# Patient Record
Sex: FEMALE | Race: BLACK OR AFRICAN AMERICAN | NOT HISPANIC OR LATINO | Employment: OTHER | URBAN - METROPOLITAN AREA
[De-identification: names, ages, dates, MRNs, and addresses within clinical notes are randomized per-mention and may not be internally consistent; named-entity substitution may affect disease eponyms.]

---

## 2017-01-27 ENCOUNTER — TELEPHONE (OUTPATIENT)
Dept: NEUROLOGY | Facility: CLINIC | Age: 32
End: 2017-01-27

## 2017-01-27 NOTE — TELEPHONE ENCOUNTER
----- Message from Rosey Cantrell sent at 1/27/2017  1:27 PM CST -----  Contact: self @ [phone]  Pt is returning bernadine's call.

## 2017-01-27 NOTE — TELEPHONE ENCOUNTER
----- Message from Jaylin Cochran sent at 1/27/2017 10:01 AM CST -----  Contact: self  Pt is having some problems walking.  Please call her at [phone].

## 2017-02-09 ENCOUNTER — TELEPHONE (OUTPATIENT)
Dept: NEUROLOGY | Facility: CLINIC | Age: 32
End: 2017-02-09

## 2017-02-09 NOTE — TELEPHONE ENCOUNTER
Returned call to Zita and she states someone faxed her over the notes. She did not need anything from us.

## 2017-02-09 NOTE — TELEPHONE ENCOUNTER
----- Message from Yong Kimbrough sent at 2/9/2017  1:41 PM CST -----  Contact: Zita with Dr Buchanan [phone]  Caller is calling to request the last H & P and last Neurological  Physical and Labs  fax to [phone] AttN: Rhonda

## 2017-02-09 NOTE — TELEPHONE ENCOUNTER
----- Message from Kassi Loyola sent at 2/9/2017 11:58 AM CST -----  Contact: Zita Emergency Room [facility] [phone]  Zita is calling on behalf if ER doctor get notes on patients health status, patient is currently in the ER.

## 2017-03-15 ENCOUNTER — TELEPHONE (OUTPATIENT)
Dept: NEUROLOGY | Facility: CLINIC | Age: 32
End: 2017-03-15

## 2017-03-15 NOTE — TELEPHONE ENCOUNTER
Attempted to call patient back, unable to leave message due to voicemail not being set up at this time. Will pass along info to Ms. Smith(w/ financial assisstance) to check for applicable coverage.

## 2017-03-15 NOTE — TELEPHONE ENCOUNTER
----- Message from Andree Crisostomo sent at 3/14/2017  2:29 PM CDT -----  Contact: Pt  Pt is calling stating that she was last seen without insurance and paid the $500.  Pt now has medicaid and would like to discuss if it will cover visit or will she still have to pay.    Pt can be reached at [phone].  Thank you

## 2017-03-17 ENCOUNTER — TELEPHONE (OUTPATIENT)
Dept: NEUROLOGY | Facility: CLINIC | Age: 32
End: 2017-03-17

## 2017-03-17 NOTE — TELEPHONE ENCOUNTER
Attempted to call patient back. Voicemail has still not been established; spoke with Ms. Sarah in financial assistance and pt's benefits have been verified. Pt should be permitted to schedule future appts.

## 2017-03-17 NOTE — TELEPHONE ENCOUNTER
----- Message from Jessica Tolbert sent at 3/15/2017  5:42 PM CDT -----  Contact: Self  Pt missed a phone call from you and would like a call back at your earliest convenience     Pt can be contacted at [phone]

## 2017-03-23 ENCOUNTER — OFFICE VISIT (OUTPATIENT)
Dept: NEUROLOGY | Facility: CLINIC | Age: 32
End: 2017-03-23
Payer: MEDICAID

## 2017-03-23 VITALS
DIASTOLIC BLOOD PRESSURE: 86 MMHG | SYSTOLIC BLOOD PRESSURE: 123 MMHG | WEIGHT: 208.13 LBS | HEART RATE: 95 BPM | BODY MASS INDEX: 32.67 KG/M2 | HEIGHT: 67 IN

## 2017-03-23 DIAGNOSIS — G31.80 LEUKODYSTROPHY: ICD-10-CM

## 2017-03-23 DIAGNOSIS — R23.4 PEELING SKIN: Primary | ICD-10-CM

## 2017-03-23 DIAGNOSIS — R53.1 GENERALIZED WEAKNESS: ICD-10-CM

## 2017-03-23 PROCEDURE — 99999 PR PBB SHADOW E&M-EST. PATIENT-LVL III: CPT | Mod: PBBFAC,,, | Performed by: PSYCHIATRY & NEUROLOGY

## 2017-03-23 PROCEDURE — 99213 OFFICE O/P EST LOW 20 MIN: CPT | Mod: PBBFAC | Performed by: PSYCHIATRY & NEUROLOGY

## 2017-03-23 PROCEDURE — 99215 OFFICE O/P EST HI 40 MIN: CPT | Mod: S$PBB,,, | Performed by: PSYCHIATRY & NEUROLOGY

## 2017-03-23 RX ORDER — TRAZODONE HYDROCHLORIDE 50 MG/1
50 TABLET ORAL NIGHTLY
Qty: 30 TABLET | Refills: 6 | Status: SHIPPED | OUTPATIENT
Start: 2017-03-23 | End: 2017-09-25 | Stop reason: SDUPTHER

## 2017-03-23 NOTE — MR AVS SNAPSHOT
[facility]  [address]  [facility] [address]  Phone: [phone]  Fax: [phone]                  Richard Perales   3/23/2017 1:40 PM   Office Visit    Description:  Female : 1985   Provider:  Braden Du MD   Department:  [facility] - Neurology           Diagnoses this Visit        Comments    Peeling skin    -  Primary            To Do List           Future Appointments        Provider Department Dept Phone    2017 1:00 PM Luis Mariscal MD [facility] [phone]      Goals (5 Years of Data)     None       These Medications        Disp Refills Start End    trazodone (DESYREL) 50 MG tablet 30 tablet 6 3/23/2017 3/23/2018    Take 1 tablet (50 mg total) by mouth every evening. - Oral    Pharmacy: [id number] - [place] - [address] #: [phone]         [facility] On Call     [facility] On Call Nurse Care Line -  Assistance  Registered nurses in the [facility] On Call Center provide clinical advisement, health education, appointment booking, and other advisory services.  Call for this free service at [phone].             Medications           Message regarding Medications     Verify the changes and/or additions to your medication regime listed below are the same as discussed with your clinician today.  If any of these changes or additions are incorrect, please notify your healthcare provider.        CHANGE how you are taking these medications     Start Taking Instead of    trazodone (DESYREL) 50 MG tablet trazodone (DESYREL) 50 MG tablet    Dosage:  Take 1 tablet (50 mg total) by mouth every evening. Dosage:  Take 0.5 tablets (25 mg total) by mouth every evening.    Reason for Change:  Reorder            Verify that the below list of medications is an accurate representation of the medications you are currently taking.  If none reported, the list may be blank. If incorrect, please contact your healthcare provider. Carry  "this list with you in case of emergency.           Current Medications     baclofen (LIORESAL) 10 MG tablet Take 1 tablet (10 mg total) by mouth 3 (three) times daily.    paroxetine (PAXIL) 20 MG tablet Take 1 tablet (20 mg total) by mouth once daily.    trazodone (DESYREL) 50 MG tablet Take 1 tablet (50 mg total) by mouth every evening.           Clinical Reference Information           Your Vitals Were     BP Pulse Height Weight BMI    123/86 95 5' 7" (1.702 m) 94.4 kg (208 lb 1.8 oz) 32.6 kg/m2      Blood Pressure          Most Recent Value    BP  123/86      Allergies as of 3/23/2017     No Known Allergies      Immunizations Administered on Date of Encounter - 3/23/2017     None      Orders Placed During Today's Visit      Normal Orders This Visit    Ambulatory consult to Dermatology       Language Assistance Services     ATTENTION: Language assistance services are available, free of charge. Please call [phone].      ATENCIÓN: Si habla giovanna, tiene a frias disposición servicios gratuitos de asistencia lingüística. Llame al [phone].     CHANDA Ý: N?u b?n nói Ti?ng Vi?t, có các d?ch v? h? tr? ngôn ng? mi?n phí dành cho b?n. G?i s? [phone].         [facility] complies with applicable Federal civil rights laws and does not discriminate on the basis of race, color, national origin, age, disability, or sex.        "

## 2017-03-23 NOTE — PROGRESS NOTES
"Name: Richard Perales  MRN: [id number]   CSN: [id number]      Date: 03/23/2017    Chief Complaint / Interval History:   - Steroids from last admission 3/2016 were helpful with most of her symptoms, especially her weakness  - Feels a sensation that her nerves are shaking throughout her whole body   - Not sleeping well at night   - states her vision has gotten worse, needs new glasses     History of Present Illness (HPI):  29 yo W with 6 months progressive neuro decline, starting with vision changes in October 2015 and culminating to non-verbal, paraplegic. History per outside chart and family. No prior medical issues except for shingles last year. Complained of vision changes in October and colleagues noticed slurring of speech in December. Started having problems with gait in January and taken to hospital by family when she could not get out of bed. Seen in [place] and apparently had "extensive" neurologic and rheumatologic evaluations (records sent are slim), transferred here as neurologist had nothing more to offer."    Past Medical History: The patient  has a past medical history of Leukodystrophy.    Social History: The patient   used to drink frequently, 3-4 drinks of wine coolers, no smoking or drugs.    Family History: Their family history includes Other in her other. Maternal great uncle was wheelchair bound for unknown reasons to patient. Patient is an only child. No other known neurologic disease in family.     Allergies: Review of patient's allergies indicates no known allergies.     Meds:   Current Outpatient Prescriptions on File Prior to Visit   Medication Sig Dispense Refill    baclofen (LIORESAL) 10 MG tablet Take 1 tablet (10 mg total) by mouth 3 (three) times daily. 90 tablet 11    paroxetine (PAXIL) 20 MG tablet Take 1 tablet (20 mg total) by mouth once daily. 30 tablet 11    trazodone (DESYREL) 50 MG tablet Take 0.5 tablets (25 mg total) by mouth every evening. 15 tablet 11     No current " "facility-administered medications on file prior to visit.        Exam:  /86  Pulse 95  Ht 5' 7" (1.702 m)  Wt 94.4 kg (208 lb 1.8 oz)  BMI 32.6 kg/m2 RR 16    Constitutional  Well-developed, well-nourished, appears stated age   Cardiovascular  Radial pulses 2+ and symmetric, no LE edema bilaterally   Neurological    * Mental status       - Orientation  Oriented to person, place, time, and situation     - Memory   Intact recent and remote     - Attention/concentration  Easily distractible     - Language  Naming & repetition intact, +2-step commands     - Fund of knowledge  Aware of current events     - Executive  Well-organized thoughts     - Other     * Cranial nerves       - CN II  PERRL, visual fields full to confrontation     - CN III, IV, VI  Extraocular movements full, normal pursuits and saccades, hypermetric saccades, moves head to initiate saccades      - CN V  Sensation V1 - V3 intact     - CN VII  Face strong and symmetric bilaterally     - CN VIII  Hearing intact bilaterally     - CN IX, X  Palate raises midline and symmetric     - CN XI  SCM and trapezius 5/5 bilaterally     - CN XII  Tongue midline   * Motor  Muscle bulk gen dim, strength 4/5 throughout - disuse   * Sensory   Intact to temperature and vibration throughout   * Coordination  No dysmetria with finger-to-nose or heel-to-shin   * Gait  Slow to arise, a bit spastic, wide based with truncal titubation    * Deep tendon reflexes  3+ and symmetric throughout     Laboratory/Radiological:  - Results:  No visits with results within 3 Month(s) from this visit.  Latest known visit with results is:    Admission on 03/21/2016, Discharged on 04/06/2016   No results displayed because visit has over 200 results.        Ceramide Trihex and Sulfatide, U   In this sample, the pattern of elevated excretions of   ceramide trihexoside species is suggestive of a biochemical   diagnosis of Fabry disease (OMIM 935830).    Alpha-Galactosidase, 66.9 " nmol/h/mg >=23.1  Leukocytes  In this specimen, the activity of alpha-galactosidase is   normal, indicating this patient is most likely NOT a   carrier for Fabry disease (OMIM 071139).      - Independent review of images:          Diagnoses:          Leukodystrophy.    Medical Decision Making:  Stable if not slightly improved after course of steroids last year     1) Repeat MRI to examine for disease progression  2) Increase trazadone to 50mg QHS  3) Full chart review to try and re-formulate differential    F/U in 6-8 weeks     Luis Mariscal MD  Movement Disorders Fellow  [facility]     =============  Patient seen and examined.  I agree with the history, exam, assessment and plan within the fellow's note as stated above.  Note has been edited by me to reflect my work and changes.    Braden Du MD, MPH  Division of Movement and Memory Disorders  [facility]

## 2017-03-29 ENCOUNTER — HOSPITAL ENCOUNTER (INPATIENT)
Facility: HOSPITAL | Age: 32
LOS: 7 days | Discharge: HOME | DRG: 057 | End: 2017-04-05
Attending: EMERGENCY MEDICINE | Admitting: EMERGENCY MEDICINE
Payer: MEDICAID

## 2017-03-29 DIAGNOSIS — G31.80 LEUKODYSTROPHY: Primary | ICD-10-CM

## 2017-03-29 DIAGNOSIS — R29.898 BILATERAL LEG WEAKNESS: ICD-10-CM

## 2017-03-29 DIAGNOSIS — R21 RASH AND NONSPECIFIC SKIN ERUPTION: ICD-10-CM

## 2017-03-29 LAB
ALBUMIN SERPL BCP-MCNC: 3.2 G/DL
ALP SERPL-CCNC: 78 U/L
ALT SERPL W/O P-5'-P-CCNC: 17 U/L
ANION GAP SERPL CALC-SCNC: 8 MMOL/L
AST SERPL-CCNC: 15 U/L
B-HCG UR QL: NEGATIVE
BASOPHILS # BLD AUTO: 0.03 K/UL
BASOPHILS NFR BLD: 0.4 %
BILIRUB SERPL-MCNC: 0.1 MG/DL
BUN SERPL-MCNC: 8 MG/DL
CALCIUM SERPL-MCNC: 8.4 MG/DL
CHLORIDE SERPL-SCNC: 109 MMOL/L
CO2 SERPL-SCNC: 23 MMOL/L
CREAT SERPL-MCNC: 0.7 MG/DL
CTP QC/QA: YES
DIFFERENTIAL METHOD: ABNORMAL
EOSINOPHIL # BLD AUTO: 0 K/UL
EOSINOPHIL NFR BLD: 0.4 %
ERYTHROCYTE [DISTWIDTH] IN BLOOD BY AUTOMATED COUNT: 18 %
EST. GFR  (AFRICAN AMERICAN): >60 ML/MIN/1.73 M^2
EST. GFR  (NON AFRICAN AMERICAN): >60 ML/MIN/1.73 M^2
GLUCOSE SERPL-MCNC: 96 MG/DL
HCT VFR BLD AUTO: 29 %
HGB BLD-MCNC: 8.7 G/DL
LYMPHOCYTES # BLD AUTO: 2.9 K/UL
LYMPHOCYTES NFR BLD: 35.7 %
MAGNESIUM SERPL-MCNC: 1.9 MG/DL
MCH RBC QN AUTO: 22 PG
MCHC RBC AUTO-ENTMCNC: 30 %
MCV RBC AUTO: 73 FL
MONOCYTES # BLD AUTO: 0.7 K/UL
MONOCYTES NFR BLD: 8.2 %
NEUTROPHILS # BLD AUTO: 4.4 K/UL
NEUTROPHILS NFR BLD: 55.1 %
PLATELET # BLD AUTO: 396 K/UL
PMV BLD AUTO: 8.8 FL
POTASSIUM SERPL-SCNC: 3.3 MMOL/L
PROT SERPL-MCNC: 7.1 G/DL
RBC # BLD AUTO: 3.95 M/UL
SODIUM SERPL-SCNC: 140 MMOL/L
WBC # BLD AUTO: 8.04 K/UL

## 2017-03-29 PROCEDURE — 11000001 HC ACUTE MED/SURG PRIVATE ROOM

## 2017-03-29 PROCEDURE — 99285 EMERGENCY DEPT VISIT HI MDM: CPT | Mod: ,,, | Performed by: EMERGENCY MEDICINE

## 2017-03-29 PROCEDURE — 25000003 PHARM REV CODE 250: Performed by: EMERGENCY MEDICINE

## 2017-03-29 PROCEDURE — 81025 URINE PREGNANCY TEST: CPT | Performed by: EMERGENCY MEDICINE

## 2017-03-29 PROCEDURE — 25500020 PHARM REV CODE 255: Performed by: EMERGENCY MEDICINE

## 2017-03-29 PROCEDURE — 99223 1ST HOSP IP/OBS HIGH 75: CPT | Mod: ,,, | Performed by: PHYSICIAN ASSISTANT

## 2017-03-29 PROCEDURE — 83735 ASSAY OF MAGNESIUM: CPT

## 2017-03-29 PROCEDURE — A9585 GADOBUTROL INJECTION: HCPCS | Performed by: EMERGENCY MEDICINE

## 2017-03-29 PROCEDURE — 99285 EMERGENCY DEPT VISIT HI MDM: CPT | Mod: 25

## 2017-03-29 PROCEDURE — 80053 COMPREHEN METABOLIC PANEL: CPT

## 2017-03-29 PROCEDURE — 12000002 HC ACUTE/MED SURGE SEMI-PRIVATE ROOM

## 2017-03-29 PROCEDURE — 85025 COMPLETE CBC W/AUTO DIFF WBC: CPT

## 2017-03-29 PROCEDURE — P9612 CATHETERIZE FOR URINE SPEC: HCPCS

## 2017-03-29 RX ORDER — POTASSIUM CHLORIDE 20 MEQ/1
40 TABLET, EXTENDED RELEASE ORAL
Status: COMPLETED | OUTPATIENT
Start: 2017-03-29 | End: 2017-03-29

## 2017-03-29 RX ORDER — GADOBUTROL 604.72 MG/ML
10 INJECTION INTRAVENOUS
Status: COMPLETED | OUTPATIENT
Start: 2017-03-29 | End: 2017-03-29

## 2017-03-29 RX ADMIN — POTASSIUM CHLORIDE 40 MEQ: 1500 TABLET, EXTENDED RELEASE ORAL at 09:03

## 2017-03-29 RX ADMIN — GADOBUTROL 10 ML: 604.72 INJECTION INTRAVENOUS at 07:03

## 2017-03-29 NOTE — ED NOTES
Transport here to bring pt to MRI. Pt has still not been able to provide urine specimen for UPT. Called MRI and informed MRI tech, states to call once patient is able to provide speimen.

## 2017-03-29 NOTE — ED NOTES
Pt reminded need for urine specimen - specimen cup provided and pt states she will provide as soon as she can.

## 2017-03-29 NOTE — ED PROVIDER NOTES
"Encounter Date: 3/29/2017    SCRIBE #1 NOTE: I, Margie Mccarty, am scribing for, and in the presence of,  Dr. Campa. I have scribed the following portions of the note - the Resident attestation.       History     Chief Complaint   Patient presents with    Extremity Weakness     C/o bilateral leg weakness x months that is getting worse. No dx yet. Reports "white matter in my brain is not normal."     Review of patient's allergies indicates:  No Known Allergies  HPI Comments: 31-year-old -American female presents to the emergency department due to bilateral lower extremity weakness.  She has been having this for the last 2 years.  She decided to come in today because she thinks it is getting worse and it caused her to fall.  Her last fall was days ago, she said she did hit her head but denies any LOC.  Also denies any chest pain, shortness breath, nausea, vomiting, lightheadedness or headache.  She has a past mental history significant for unknown neurological decline that started in 2015.  Initially started with slurred speech but has since then progressed to intermittent weakness.  She's had extensive workups at multiple hospitals.  She is followed by neuro at this hospital and last saw them on the 24th.  She did not tell them about her increased weakness at that time.    The history is provided by the patient.     History reviewed. No pertinent past medical history.  History reviewed. No pertinent surgical history.  History reviewed. No pertinent family history.  Social History   Substance Use Topics    Smoking status: Never Smoker    Smokeless tobacco: None    Alcohol use None     Review of Systems   Constitutional: Negative for activity change and appetite change.   HENT: Negative for congestion and ear discharge.    Eyes: Negative for pain and discharge.   Respiratory: Negative for apnea and chest tightness.    Cardiovascular: Negative for chest pain and leg swelling.   Gastrointestinal: Negative for " abdominal distention and abdominal pain.   Genitourinary: Negative for difficulty urinating and dysuria.   Musculoskeletal: Negative for arthralgias and back pain.   Skin: Negative for color change and pallor.   Neurological: Positive for weakness. Negative for headaches.   All other systems reviewed and are negative.      Physical Exam   Initial Vitals   BP Pulse Resp Temp SpO2   03/29/17 1421 03/29/17 1421 03/29/17 1421 03/29/17 1421 03/29/17 1421   130/68 101 18 99.2 °F (37.3 °C) 99 %     Physical Exam    Nursing note and vitals reviewed.  Constitutional: She appears well-developed and well-nourished. She is not diaphoretic. No distress.   HENT:   Head: Normocephalic and atraumatic.   Right Ear: External ear normal.   Left Ear: External ear normal.   Mouth/Throat: Oropharynx is clear and moist.   Eyes: EOM are normal. Pupils are equal, round, and reactive to light. No scleral icterus.   Neck: Normal range of motion. Neck supple. No JVD present.   Cardiovascular: Normal rate, regular rhythm, normal heart sounds and intact distal pulses. Exam reveals no friction rub.    No murmur heard.  Pulmonary/Chest: Breath sounds normal. No stridor. No respiratory distress. She has no wheezes.   Abdominal: Soft. Bowel sounds are normal. She exhibits no distension. There is no tenderness.   Neurological: She is alert and oriented to person, place, and time. No cranial nerve deficit.   Strength of the right lower extremity 5/5 left lower lower extremity 4/5, strength of the upper extremity 5/5 bilaterally.  Sensation intact.  Cranial nerve II through XII grossly intact.  No tremors noted.   Skin: Skin is warm and dry. No erythema. No pallor.     : no CVA ttp  Back: no midline spinal ttp.    ED Course   Procedures  Labs Reviewed   CBC W/ AUTO DIFFERENTIAL - Abnormal; Notable for the following:        Result Value    RBC 3.95 (*)     Hemoglobin 8.7 (*)     Hematocrit 29.0 (*)     MCV 73 (*)     MCH 22.0 (*)     MCHC 30.0 (*)      RDW 18.0 (*)     Platelets 396 (*)     MPV 8.8 (*)     All other components within normal limits   COMPREHENSIVE METABOLIC PANEL - Abnormal; Notable for the following:     Potassium 3.3 (*)     Calcium 8.4 (*)     Albumin 3.2 (*)     All other components within normal limits   POCT URINE PREGNANCY - Normal   MAGNESIUM         Imaging Results         MRI Brain W WO Contrast (Final result) Result time:  03/29/17 21:05:12    Final result by Beto Lion MD (03/29/17 21:05:12)    Impression:      Slight interval progression of age advanced generalized cerebral volume loss and abnormal T2/FLAIR hyperintensities throughout the supratentorial white matter with more prominent involvement of the splenium and left periatrial white matter on today's examination.  Although nonspecific, these findings would be in keeping with patient's clinical diagnosis of underlying leukodystrophy.  Short-term followup is suggested.    No evidence of abnormal enhancement.    No acute infarct or hemorrhage.  No hydrocephalus.        ______________________________________     Electronically signed by resident: BETO LION MD  Date:     03/29/17  Time:    20:23            As the supervising and teaching physician, I personally reviewed the images and resident's interpretation and I agree with the findings.          Electronically signed by: PAULA GARCIA MD  Date:     03/29/17  Time:    21:05     Narrative:    MRI brain with contrast    03/29/17 19:37:36    Accession# [id number]    CLINICAL INDICATION: 31 year old F with history of progressive lower extremities weakness     TECHNIQUE: Multiplanar multisequence MR imaging of the brain was performed before and after the administration of 10 cc of Gadavistintravenous contrast.     COMPARISON:  MRI 3/8/2016.    FINDINGS:  The ventricles are stable in size without evidence of hydrocephalus.  There is redemonstration of age advanced generalized cerebral volume loss with increase sulcation slightly increased  compared to the prior examination from 3/8/2016.  There is redemonstration of scattered abnormal T2/FLAIR hyperintensities within the supratentorial white matter bilaterally with more confluent area of involvement within the left periatrial region.  There is also involvement of the splenium of the corpus callosum with infratentorial extension to the dentate nuclei via the middle cerebellar peduncles.  These findings have overall slightly progressed.    No evidence of acute infarct or hemorrhage.  No abnormal extra-axial blood or fluid collections.  There is no abnormal parenchymal or leptomeningeal enhancement.    No midline shift or mass effect.  The major skull base T2 flow voids are present.  The visualized paranasal sinuses and mastoid air cells are clear.  Osseous marrow signal is within normal limits.  Structures within the sella and suprasellar region are within normal limits.  The basilar cisterns are patent.                   Medical Decision Making:   History:   Old Medical Records: I decided to obtain old medical records.  Clinical Tests:   Lab Tests: Ordered and Reviewed  Radiological Study: Ordered and Reviewed  Other:   I have discussed this case with another health care provider.       APC / Resident Notes:   HOII MDM  A/P: 31-year-old with weakness.  Differential includes but is not limited to worsening leukodystrophy, anemia, electrolyte abnormality.  Workup- CBC, CMP, mag - results significant for no leukocytosis, H/H8.7/29, sodium 140, potassium 3.3, chloride 109, CO2 23, BUN 8, creatinine 0.7.  MRI shows interval progression of cerebral volume loss and abnormal hyperintensities consistent with progression of leukodystrophy.  Patient will need neurology consult.  I have consulted and admitted the patient to the medicine team with every 4hr  neuro checks.  Tl Mcdowell PGY-2 [employer] EM  03/29/2017 10:26 PM         Scribe Attestation:   Scribe #1: I performed the above scribed service and the  documentation accurately describes the services I performed. I attest to the accuracy of the note.    Attending Attestation:   Physician Attestation Statement for Resident:  As the supervising MD   Physician Attestation Statement: I have personally seen and examined this patient.   I agree with the above history. -:   As the supervising MD I agree with the above PE.    As the supervising MD I agree with the above treatment, course, plan, and disposition.   -: Pt with PM Hx of leukoencephalopathy presents with BLE weakness present for months but is worsening. I considered progression of leukoencephalopathy and felt this was the most likely diagnosis. Also considered cauda equina, but felt this was less likely given physical exam and hx. I plan to check labs and get MRI brain. Anticipate admission to medicine for neurology consultation.   I have reviewed and agree with the residents interpretation of the following: lab data.  I have reviewed the following: old records at this facility.          Physician Attestation for Scribe:  Physician Attestation Statement for Scribe #1: I, Dr. Campa, reviewed documentation, as scribed by Margie Mccarty in my presence, and it is both accurate and complete.                 ED Course     Clinical Impression:   The primary encounter diagnosis was Leukodystrophy. A diagnosis of Bilateral leg weakness was also pertinent to this visit.          Abdullahi Campa MD  03/29/17 [id number]

## 2017-03-29 NOTE — IP AVS SNAPSHOT
[facility]  [address]  [facility] [address]  Phone: [phone]           Patient Discharge Instructions   Our goal is to set you up for success. This packet includes information on your condition, medications, and your home care.  It will help you care for yourself to prevent having to return to the hospital.     Please ask your nurse if you have any questions.      There are many details to remember when preparing to leave the hospital. Here is what you will need to do:    1. Take your medicine. If you are prescribed medications, review your Medication List on the following pages. You may have new medications to  at the pharmacy and others that you'll need to stop taking. Review the instructions for how and when to take your medications. Talk with your doctor or nurses if you are unsure of what to do.     2. Go to your follow-up appointments. Specific follow-up information is listed in the following pages. Your may be contacted by a nurse or clinical provider about future appointments. Be sure we have all of the phone numbers to reach you. Please contact your provider's office if you are unable to make an appointment.     3. Watch for warning signs. Your doctor or nurse will give you detailed warning signs to watch for and when to call for assistance. These instructions may also include educational information about your condition. If you experience any of warning signs to your health, call your doctor.           [facility]  Unless otherwise directed by your provider, please   contact [facility], our nurse care line   that is available for 24/7 assistance.     [phone] (toll-free)     Registered nurses in the [facility]   provide: appointment scheduling, clinical advisement, health education, and other advisory services.                  ** Verify the list of medication(s) below is accurate and up to date. Carry this with you in case of  emergency. If your medications have changed, please notify your healthcare provider.             Medication List      START taking these medications        Additional Info                      acetaminophen 325 MG tablet   Commonly known as:  TYLENOL   Refills:  0   Dose:  650 mg    Last time this was given:  650 mg on 4/5/2017  5:13 AM   Instructions:  Take 2 tablets (650 mg total) by mouth every 6 (six) hours.     Begin Date    AM    Noon    PM    Bedtime       folic acid-vit B6-vit B12 2.5-25-2 mg 2.5-25-2 mg Tab   Commonly known as:  FOLBIC or Equiv   Quantity:  30 tablet   Refills:  0   Dose:  1 tablet    Last time this was given:  1 tablet on 4/4/2017  8:56 AM   Instructions:  Take 1 tablet by mouth once daily.     Begin Date    AM    Noon    PM    Bedtime       multivitamin tablet   Commonly known as:  THERAGRAN   Refills:  0   Dose:  1 tablet    Last time this was given:  1 tablet on 4/4/2017  8:57 AM   Instructions:  Take 1 tablet by mouth once daily.     Begin Date    AM    Noon    PM    Bedtime         CHANGE how you take these medications        Additional Info                      baclofen 20 MG tablet   Commonly known as:  LIORESAL   Quantity:  90 tablet   Refills:  0   Dose:  20 mg   What changed:    - medication strength  - how much to take    Last time this was given:  20 mg on 4/5/2017  5:14 AM   Instructions:  Take 1 tablet (20 mg total) by mouth 3 (three) times daily.     Begin Date    AM    Noon    PM    Bedtime         CONTINUE taking these medications        Additional Info                      paroxetine 20 MG tablet   Commonly known as:  PAXIL   Quantity:  30 tablet   Refills:  11   Dose:  20 mg    Last time this was given:  20 mg on 4/4/2017  8:56 AM   Instructions:  Take 1 tablet (20 mg total) by mouth once daily.     Begin Date    AM    Noon    PM    Bedtime       trazodone 50 MG tablet   Commonly known as:  DESYREL   Quantity:  30 tablet   Refills:  6   Dose:  50 mg    Last time this was  given:  50 mg on 4/4/2017  8:44 PM   Instructions:  Take 1 tablet (50 mg total) by mouth every evening.     Begin Date    AM    Noon    PM    Bedtime            Where to Get Your Medications      These medications were sent to [id number] [place]  [address]  [address] [address]     Phone:  [phone]     baclofen 20 MG tablet    folic acid-vit B6-vit B12 2.5-25-2 mg 2.5-25-2 mg Tab         You can get these medications from any pharmacy     You don't need a prescription for these medications     acetaminophen 325 MG tablet    multivitamin tablet                  Please bring to all follow up appointments:    1. A copy of your discharge instructions.  2. All medicines you are currently taking in their original bottles.  3. Identification and insurance card.    Please arrive 15 minutes ahead of scheduled appointment time.    Please call 24 hours in advance if you must reschedule your appointment and/or time.        Your Scheduled Appointments     May 17, 2017  1:00 PM CDT   Neurology - Established Patient with MD Ritesh Marley german - Neurology ([facility] )    [address]  [place] [address] [phone]              Follow-up Information     Follow up with [facility] - INTERNAL MEDICINE .    Specialty:  Internal Medicine    Contact information:    [address] [facility] [place] [address] [phone]          Follow up with Kaleb Schreiber MD .    Specialty:  Genetics    Contact information:    [address]  [address]  [facility] [address]  [phone]          Follow up with [facility] .    Specialty:  Dermatology    Contact information:    [address]  [place] [address] [phone]        Referrals     Future Orders    Ambulatory Referral to Dermatology     Ambulatory Referral to Genetics     Ambulatory Referral to Internal Medicine     Ambulatory Referral to Neurology     Ambulatory Referral to  "Physical/Occupational Therapy     Questions:    Post Surgical?:  No    Eval and Treat:  Yes    Duration:      Frequency (times per week):      Precautions:      Location:      OT Location:      Restore Functional ADL Training?:      Gait Training:      Therapeutic Exercises:      Wound Care:      Traction:      Electric Stimulation:      IONTO.:      U/S:      Developmental Stimulation?:      Specialty Programs:          Discharge Instructions     Future Orders    Activity as tolerated     Call MD for:  redness, tenderness, or signs of infection (pain, swelling, redness, odor or green/yellow discharge around incision site)     Call MD for:  temperature >100.4     Call MD for:  worsening rash     Diet general     Questions:    Total calories:      Fat restriction, if any:      Protein restriction, if any:      Na restriction, if any:      Fluid restriction:      Additional restrictions:          Primary Diagnosis     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      Admission Information     Date & Time Provider Department CSN    3/29/2017  3:41 PM Benji Uriarte MD [facility] [id number]      Care Providers     Provider Role Specialty Primary office phone    Benji Uriarte MD Attending Provider Hospitalist [phone]    Mervat Betancur MD Team Attending  Hospitalist [phone]    Benji Uriarte MD Team Attending  Hospitalist [phone]      Your Vitals Were     BP Pulse Temp Resp Height Weight    131/88 (BP Location: Right arm, Patient Position: Lying, BP Method: Automatic) 74 98.4 °F (36.9 °C) (Oral) 16 5' 4" (1.626 m) 94.4 kg (208 lb 1.6 oz)    Last Period SpO2 BMI          03/09/2017 99% 35.72 kg/m2        Recent Lab Values        3/14/2016                           9:46 AM           A1C 4.6                       Allergies as of 4/5/2017     No Known Allergies      Advance Directives     An advance directive is a document which, in the event you are no " longer able to make decisions for yourself, tells your healthcare team what kind of treatment you do or do not want to receive, or who you would like to make those decisions for you.  If you do not currently have an advance directive, [facility] encourages you to create one.  For more information call:  [phone]), [phone] ([phone]),  or log on to [email or web address].        Language Assistance Services     ATTENTION: Language assistance services are available, free of charge. Please call [phone].      ATENCIÓN: Si habla español, tiene a frias disposición servicios gratuitos de asistencia lingüística. Llame al [phone].     CHÚ Ý: N?u b?n nói Ti?ng Vi?t, có các d?ch v? h? tr? ngôn ng? mi?n phí dành cho b?n. G?i s? [phone].         [facility] complies with applicable Federal civil rights laws and does not discriminate on the basis of race, color, national origin, age, disability, or sex.

## 2017-03-29 NOTE — PROVIDER PROGRESS NOTES - EMERGENCY DEPT.
Encounter Date: 3/29/2017    ED Physician Progress Notes       SCRIBE NOTE: I, Angella Powell, am scribing for, and in the presence of,  Dr. Mayen.  Physician Statement: I, Dr. Mayen, personally performed the services described in this documentation as scribed by Angella Powell in my presence, and it is both accurate and complete.     Physician Note:   Time patient was seen by the provider: 4:19 PM      The patient is a 31 y.o. female with hx of: leukodystrophy that presents to the ED with a complaint of worsening BLE weakness. Pt was admitted to [facility] 1 year ago for progressive neurologic symptoms, weakness. Pt had an extensive workup and was ultimately treated with steroids. Pt had a complicated hospital course and eventually was discharged with some good recovery of her neurologic function. Pt followed by Dr. Du in neurology and saw him 1 week ago. She reports more visual changes, balance issues, shakiness, and recurrent progressive weakness x 1 week. She reports 3 falls in the last week. No fever, chills.    Will check basic labs and refer back to the ED as pt will likely need an inpatient admission with a neurology consult.    I initially evaluated this patient and ordered workup while in intake.  The patient will receive a full evaluation in an ED pod when space is available.  All results from tests ordered in intake will not be followed by the intake team, including myself. All results will be followed by the ED Pod team.

## 2017-03-29 NOTE — ED TRIAGE NOTES
Pt states she fell 3x yesterday and states her legs are weak. Pt states her balance is off. Pt has no other complaints. Pt states her speech has been slurred for weeks.

## 2017-03-29 NOTE — ED NOTES
Pt states she is still unable to urinate. Notified Dr. Campa. Dr. Campa ordering straight catheter. If more than 500ml present at time of insertion, leave catheter in. Amount less than 500ml, take catheter out.

## 2017-03-30 LAB
ANION GAP SERPL CALC-SCNC: 7 MMOL/L
ANISOCYTOSIS BLD QL SMEAR: SLIGHT
BASOPHILS # BLD AUTO: 0.03 K/UL
BASOPHILS NFR BLD: 0.3 %
BUN SERPL-MCNC: 6 MG/DL
CALCIUM SERPL-MCNC: 8.3 MG/DL
CHLORIDE SERPL-SCNC: 108 MMOL/L
CO2 SERPL-SCNC: 24 MMOL/L
CREAT SERPL-MCNC: 0.6 MG/DL
DIFFERENTIAL METHOD: ABNORMAL
EOSINOPHIL # BLD AUTO: 0.1 K/UL
EOSINOPHIL NFR BLD: 0.6 %
ERYTHROCYTE [DISTWIDTH] IN BLOOD BY AUTOMATED COUNT: 18.2 %
EST. GFR  (AFRICAN AMERICAN): >60 ML/MIN/1.73 M^2
EST. GFR  (NON AFRICAN AMERICAN): >60 ML/MIN/1.73 M^2
GLUCOSE SERPL-MCNC: 89 MG/DL
HCT VFR BLD AUTO: 27.3 %
HGB BLD-MCNC: 8.2 G/DL
HYPOCHROMIA BLD QL SMEAR: ABNORMAL
LYMPHOCYTES # BLD AUTO: 3.8 K/UL
LYMPHOCYTES NFR BLD: 43 %
MAGNESIUM SERPL-MCNC: 1.9 MG/DL
MCH RBC QN AUTO: 21.8 PG
MCHC RBC AUTO-ENTMCNC: 30 %
MCV RBC AUTO: 73 FL
MONOCYTES # BLD AUTO: 0.7 K/UL
MONOCYTES NFR BLD: 8.3 %
NEUTROPHILS # BLD AUTO: 4.2 K/UL
NEUTROPHILS NFR BLD: 47.8 %
PHOSPHATE SERPL-MCNC: 3.1 MG/DL
PLATELET # BLD AUTO: 372 K/UL
PLATELET BLD QL SMEAR: ABNORMAL
PMV BLD AUTO: 8.8 FL
POIKILOCYTOSIS BLD QL SMEAR: SLIGHT
POTASSIUM SERPL-SCNC: 3.5 MMOL/L
RBC # BLD AUTO: 3.76 M/UL
SODIUM SERPL-SCNC: 139 MMOL/L
WBC # BLD AUTO: 8.84 K/UL

## 2017-03-30 PROCEDURE — 25000003 PHARM REV CODE 250: Performed by: HOSPITALIST

## 2017-03-30 PROCEDURE — 85025 COMPLETE CBC W/AUTO DIFF WBC: CPT

## 2017-03-30 PROCEDURE — 63600175 PHARM REV CODE 636 W HCPCS: Performed by: HOSPITALIST

## 2017-03-30 PROCEDURE — 97110 THERAPEUTIC EXERCISES: CPT

## 2017-03-30 PROCEDURE — 99233 SBSQ HOSP IP/OBS HIGH 50: CPT | Mod: ,,, | Performed by: PSYCHIATRY & NEUROLOGY

## 2017-03-30 PROCEDURE — 11000001 HC ACUTE MED/SURG PRIVATE ROOM

## 2017-03-30 PROCEDURE — 97162 PT EVAL MOD COMPLEX 30 MIN: CPT

## 2017-03-30 PROCEDURE — 36415 COLL VENOUS BLD VENIPUNCTURE: CPT

## 2017-03-30 PROCEDURE — 99406 BEHAV CHNG SMOKING 3-10 MIN: CPT

## 2017-03-30 PROCEDURE — 25000003 PHARM REV CODE 250: Performed by: PHYSICIAN ASSISTANT

## 2017-03-30 PROCEDURE — 80048 BASIC METABOLIC PNL TOTAL CA: CPT

## 2017-03-30 PROCEDURE — 83735 ASSAY OF MAGNESIUM: CPT

## 2017-03-30 PROCEDURE — 84100 ASSAY OF PHOSPHORUS: CPT

## 2017-03-30 RX ORDER — LOPERAMIDE HYDROCHLORIDE 2 MG/1
2 CAPSULE ORAL
Status: DISCONTINUED | OUTPATIENT
Start: 2017-03-30 | End: 2017-04-06 | Stop reason: HOSPADM

## 2017-03-30 RX ORDER — IBUPROFEN 200 MG
24 TABLET ORAL
Status: DISCONTINUED | OUTPATIENT
Start: 2017-03-30 | End: 2017-04-06 | Stop reason: HOSPADM

## 2017-03-30 RX ORDER — POLYETHYLENE GLYCOL 3350 17 G/17G
17 POWDER, FOR SOLUTION ORAL 3 TIMES DAILY PRN
Status: DISCONTINUED | OUTPATIENT
Start: 2017-03-30 | End: 2017-04-06 | Stop reason: HOSPADM

## 2017-03-30 RX ORDER — GLUCAGON 1 MG
1 KIT INJECTION
Status: DISCONTINUED | OUTPATIENT
Start: 2017-03-30 | End: 2017-04-06 | Stop reason: HOSPADM

## 2017-03-30 RX ORDER — ACETAMINOPHEN 325 MG/1
650 TABLET ORAL EVERY 4 HOURS PRN
Status: DISCONTINUED | OUTPATIENT
Start: 2017-03-30 | End: 2017-04-04

## 2017-03-30 RX ORDER — RAMELTEON 8 MG/1
8 TABLET ORAL NIGHTLY PRN
Status: DISCONTINUED | OUTPATIENT
Start: 2017-03-30 | End: 2017-04-06 | Stop reason: HOSPADM

## 2017-03-30 RX ORDER — IPRATROPIUM BROMIDE AND ALBUTEROL SULFATE 2.5; .5 MG/3ML; MG/3ML
3 SOLUTION RESPIRATORY (INHALATION) EVERY 4 HOURS PRN
Status: DISCONTINUED | OUTPATIENT
Start: 2017-03-30 | End: 2017-04-06 | Stop reason: HOSPADM

## 2017-03-30 RX ORDER — BACLOFEN 10 MG/1
10 TABLET ORAL 3 TIMES DAILY
Status: DISCONTINUED | OUTPATIENT
Start: 2017-03-30 | End: 2017-04-04

## 2017-03-30 RX ORDER — ONDANSETRON 4 MG/1
8 TABLET, ORALLY DISINTEGRATING ORAL EVERY 8 HOURS PRN
Status: DISCONTINUED | OUTPATIENT
Start: 2017-03-30 | End: 2017-04-06 | Stop reason: HOSPADM

## 2017-03-30 RX ORDER — IBUPROFEN 200 MG
16 TABLET ORAL
Status: DISCONTINUED | OUTPATIENT
Start: 2017-03-30 | End: 2017-04-06 | Stop reason: HOSPADM

## 2017-03-30 RX ORDER — PROMETHAZINE HYDROCHLORIDE 25 MG/1
25 TABLET ORAL EVERY 6 HOURS PRN
Status: DISCONTINUED | OUTPATIENT
Start: 2017-03-30 | End: 2017-04-06 | Stop reason: HOSPADM

## 2017-03-30 RX ORDER — PAROXETINE 10 MG/1
20 TABLET, FILM COATED ORAL DAILY
Status: DISCONTINUED | OUTPATIENT
Start: 2017-03-30 | End: 2017-04-06 | Stop reason: HOSPADM

## 2017-03-30 RX ADMIN — TRAZODONE HYDROCHLORIDE 50 MG: 50 TABLET ORAL at 09:03

## 2017-03-30 RX ADMIN — BACLOFEN 10 MG: 10 TABLET ORAL at 09:03

## 2017-03-30 RX ADMIN — BACLOFEN 10 MG: 10 TABLET ORAL at 01:03

## 2017-03-30 RX ADMIN — TRAZODONE HYDROCHLORIDE 50 MG: 50 TABLET ORAL at 01:03

## 2017-03-30 RX ADMIN — PAROXETINE HYDROCHLORIDE 20 MG: 10 TABLET, FILM COATED ORAL at 08:03

## 2017-03-30 RX ADMIN — BACLOFEN 10 MG: 10 TABLET ORAL at 06:03

## 2017-03-30 RX ADMIN — DEXTROSE: 50 INJECTION, SOLUTION INTRAVENOUS at 04:03

## 2017-03-30 NOTE — ASSESSMENT & PLAN NOTE
"Ms. Perales is a 31 year old woman with a complicated recent history, with a new diagnosis of leukodystrophy (though the cause is still undetermined). 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 Her current presentation of "weakness" is more likely due to incoordination as seen on exam, and is consistent with the new lesions seen in her cerebellum. MRA was negative for any vascular abnormalities.    Recommendations:  -- 1000mg IV solumedrol daily x3 days - will likely be stable for discharge home after final dose  -- Redraw thiamine (previous labs from 3/18/16 showed deficiency, no test since in our system) - consider starting replacement as this is a sendout test and is unlikely to result prior to discharge  -- Physical and occupational therapy    "

## 2017-03-30 NOTE — H&P
"[facility] Hospital Medicine  History & Physical    Patient Name: Richard Perales  MRN: [id number]  Admission Date: 3/29/2017  Attending Physician: PHILL Cruz MD   Primary Care Provider: Primary Doctor [facility] Medicine Team: [facility] MED S Donna Mcdonald PA-C     Patient information was obtained from patient, past medical records and ER records.     Subjective:     Principal Problem:Leukodystrophy    Chief Complaint:   Chief Complaint   Patient presents with    Extremity Weakness     C/o bilateral leg weakness x months that is getting worse. No dx yet. Reports "white matter in my brain is not normal."        HPI: Patient is a 31 year old lady with a h/o leukodystrophy.  She presents with worsening bilateral LE weakness.  She normally ambulates without assistance, but has had to use a walker the past two days.  She reports three falls during this time period.  She did hit her head once, but denies LOC.  Denies chest pain, SOB, dizziness, palpitations, fever/chills, N/V/D.  States she had a cold last week, but symptoms have resolved.  Patient was originally diagnosed with leukodystrophy in 2015 after onset of slurred speech, vision changes, and weakness.  She follows with neurology as an outpatient.    Past Medical History:   Diagnosis Date    Leukodystrophy        History reviewed. No pertinent surgical history.    Review of patient's allergies indicates:  No Known Allergies    No current facility-administered medications on file prior to encounter.      Current Outpatient Prescriptions on File Prior to Encounter   Medication Sig    baclofen (LIORESAL) 10 MG tablet Take 1 tablet (10 mg total) by mouth 3 (three) times daily.    paroxetine (PAXIL) 20 MG tablet Take 1 tablet (20 mg total) by mouth once daily.    trazodone (DESYREL) 50 MG tablet Take 1 tablet (50 mg total) by mouth every evening.     Family History     None        Social History Main Topics    Smoking status: " Never Smoker    Smokeless tobacco: Not on file    Alcohol use Not on file    Drug use: Not on file    Sexual activity: Not on file     Review of Systems   Constitutional: Positive for activity change. Negative for appetite change, chills, fatigue, fever and unexpected weight change.   HENT: Negative for congestion, rhinorrhea, sore throat, trouble swallowing and voice change.    Eyes: Negative for visual disturbance.   Respiratory: Negative for cough, choking, chest tightness, shortness of breath and wheezing.    Cardiovascular: Negative for chest pain, palpitations and leg swelling.   Gastrointestinal: Negative for abdominal distention, abdominal pain, anal bleeding, blood in stool, constipation, diarrhea, nausea and vomiting.   Endocrine: Negative for cold intolerance, heat intolerance, polydipsia and polyuria.   Genitourinary: Negative for dysuria, flank pain, frequency, hematuria and urgency.   Musculoskeletal: Negative for arthralgias, back pain, joint swelling and myalgias.   Skin: Negative for color change and rash.   Neurological: Positive for weakness. Negative for dizziness, seizures, syncope, facial asymmetry, speech difficulty, light-headedness, numbness and headaches.   Hematological: Negative for adenopathy. Does not bruise/bleed easily.   Psychiatric/Behavioral: Negative for agitation, confusion, hallucinations and suicidal ideas.     Objective:     Vital Signs (Most Recent):  Temp: 99.9 °F (37.7 °C) (03/29/17 1724)  Pulse: 90 (03/29/17 1724)  Resp: 16 (03/29/17 1724)  BP: (!) 142/81 (03/29/17 1724)  SpO2: 100 % (03/29/17 1724) Vital Signs (24h Range):  Temp:  [99.2 °F (37.3 °C)-99.9 °F (37.7 °C)] 99.9 °F (37.7 °C)  Pulse:  [] 90  Resp:  [16-18] 16  SpO2:  [99 %-100 %] 100 %  BP: (130-142)/(68-81) 142/81        There is no height or weight on file to calculate BMI.    Physical Exam   Constitutional: She is oriented to person, place, and time. She appears well-developed and well-nourished. No  distress.   HENT:   Head: Normocephalic and atraumatic.   Eyes: Pupils are equal, round, and reactive to light.   Neck: Neck supple. Carotid bruit is not present. No thyromegaly present.   Cardiovascular: Normal rate and regular rhythm.  Exam reveals no gallop.    No murmur heard.  Pulmonary/Chest: Effort normal and breath sounds normal. No respiratory distress. She has no wheezes.   Abdominal: Soft. Bowel sounds are normal. She exhibits no distension and no mass. There is no splenomegaly or hepatomegaly. There is no tenderness.   Musculoskeletal: Normal range of motion. She exhibits no edema or deformity.   Neurological: She is alert and oriented to person, place, and time. No cranial nerve deficit or sensory deficit.   5/5 RLE strength, 4/5 LLE strength, 5/5 BUE strength   Skin: Skin is warm and dry. No rash noted.   Psychiatric: She has a normal mood and affect.        Significant Labs: All pertinent labs within the past 24 hours have been reviewed.    Significant Imaging: I have reviewed all pertinent imaging results/findings within the past 24 hours.    Assessment/Plan:     * Leukodystrophy  - MRI showed slight interval progression of age advanced generalized cerebral volume loss and abnormal T2/FLAIR hyperintensities throughout the supratentorial white matter with more prominent involvement of the splenium and left periatrial white matter.  - Will consult neurology, PT/OT, SW.  - Supportive care, fall precautions, ambulate with assistance.  - Continue Baclofen 10mg TID, Paxil 20mg daily, and trazodone 50mg qHS.      VTE Risk Mitigation         Ordered     Medium Risk of VTE  Once      03/30/17 0021     Place LEXII hose  Until discontinued      03/30/17 0021     Place sequential compression device  Until discontinued      03/30/17 0021        Donna Mcdonald PA-C  Department of Hospital Medicine   [facility]

## 2017-03-30 NOTE — CONSULTS
"[facility]  Neurology  Consult Note    Patient Name: Richard Perales  MRN: [id number]  Admission Date: 3/29/2017  Hospital Length of Stay: 1 days  Code Status: Full Code   Attending Provider: Raine Benito MD   Consulting Provider: Pola Oneal MD  Primary Care Physician: Primary Doctor No  Principal Problem:Leukodystrophy    Inpatient consult to Neurology  Consult performed by: POLA ONEAL.  Consult ordered by: RAINE BENITO         Subjective:     Chief Complaint:  Lower extremity "weakness"     HPI:   Ms. Perales is a 31 year old woman with a complicated history, who is admitted for worsening weakness. She initially presented to [facility] in February 2016, complaining of visual changes (unable to describe, ?blurry) and generalized weakness since October 2015. MRI brain showed non-specific white matter changes concerning for leukodystrophy vs demyelination. She was transferred to [facility] for further workup in March 2016. Her symptoms progressed to her being nonverbal and paraplegic, and she would reportedly alternate between catatonia and severe weakness. Workup included LP which was unremarkable, including negative for MS. Autoimmune, porphyria and infectious workup was also negative (including HIV). She did have a breast abscess that was aspirated, and subsequent bacteremia (staph) while at [facility] that was treated with IV antibiotics. She also developed a scaly rash, and biopsy showed "pityriasis rubra pilaris".     She also had an EEG and EMG which were negative. Diagnosis was unclear, but the differential was a leukodystrophy. She was treated with IV steroids with a PO taper, to which she responded well. She was discharged to inpatient rehab, where she regained strength and was eventually able to walk unassisted. She has been following with Dr. Du as an outpatient, who is continuing with investigations including genetic studies. She had contradicting results for Fabry " "disease, including normal alpha-galactoside activity but increased ceramide trihexoside species.     She says that when she returned from her Neurology appointment last week, she had a fall due to lower extremity weakness. She says since then she has been ambulating with a walker and had a few falls. She says that she feels her strength is there while lying, but she feels weak when she stands up. She also describes a sensation of her nerves shaking and says her vision has worsened. She is unable to characterize her vision changes, but says "you could say it's blurry" even with her glasses.       Past Medical History:   Diagnosis Date    Leukodystrophy        History reviewed. No pertinent surgical history.    Review of patient's allergies indicates:  No Known Allergies      No current facility-administered medications on file prior to encounter.      Current Outpatient Prescriptions on File Prior to Encounter   Medication Sig    baclofen (LIORESAL) 10 MG tablet Take 1 tablet (10 mg total) by mouth 3 (three) times daily.    paroxetine (PAXIL) 20 MG tablet Take 1 tablet (20 mg total) by mouth once daily.    trazodone (DESYREL) 50 MG tablet Take 1 tablet (50 mg total) by mouth every evening.     Family History     None        Social History Main Topics    Smoking status: Never Smoker    Smokeless tobacco: Not on file    Alcohol use Not on file    Drug use: Not on file    Sexual activity: Not on file     Review of Systems   Constitutional: Negative for chills and fever.   HENT: Negative for sore throat.    Eyes: Positive for visual disturbance. Negative for photophobia.   Respiratory: Negative for cough, chest tightness and shortness of breath.    Cardiovascular: Negative for leg swelling.   Gastrointestinal: Negative for abdominal pain, diarrhea, nausea and vomiting.   Endocrine: Negative for polyuria.   Genitourinary: Negative for dysuria and hematuria.   Musculoskeletal: Negative for arthralgias and " myalgias.   Skin:        Patchy areas of dry skin   Neurological: Positive for weakness. Negative for headaches.     Objective:     Vital Signs (Most Recent):  Temp: 97.6 °F (36.4 °C) (03/30/17 0758)  Pulse: 69 (03/30/17 0758)  Resp: 12 (03/30/17 0758)  BP: 106/63 (03/30/17 0758)  SpO2: 100 % (03/30/17 0758) Vital Signs (24h Range):  Temp:  [97.6 °F (36.4 °C)-99.9 °F (37.7 °C)] 97.6 °F (36.4 °C)  Pulse:  [] 69  Resp:  [12-18] 12  SpO2:  [96 %-100 %] 100 %  BP: (106-142)/(63-86) 106/63     Weight: 88.9 kg (196 lb)  Body mass index is 33.64 kg/(m^2).    Physical Exam   Constitutional: She is oriented to person, place, and time. She appears well-developed and well-nourished.   HENT:   Head: Normocephalic and atraumatic.   Eyes: EOM are normal. Pupils are equal, round, and reactive to light.   Pulmonary/Chest: Effort normal.   Neurological: She is oriented to person, place, and time. She has an abnormal Finger-Nose-Finger Test (L>R. Also with Disdiadochokinesis (L>R) ) and an abnormal Heel to Shin Test (L>R).   Reflex Scores:       Tricep reflexes are 3+ on the right side and 3+ on the left side.       Bicep reflexes are 3+ on the right side and 3+ on the left side.       Brachioradialis reflexes are 3+ on the right side and 3+ on the left side.       Patellar reflexes are 3+ on the right side.       Achilles reflexes are 3+ on the right side.  Skin: Skin is warm.   Psychiatric: She has a normal mood and affect. Her speech is normal.   Nursing note and vitals reviewed.      NEUROLOGICAL EXAMINATION:     MENTAL STATUS   Oriented to person, place, and time.   Registration: recalls 3 of 3 objects. Recall at 5 minutes: recalls 3 of 3 objects.   Attention: normal. Concentration: normal.   Speech: speech is normal   Level of consciousness: alert    CRANIAL NERVES     CN III, IV, VI   Pupils are equal, round, and reactive to light.  Extraocular motions are normal.   Nystagmus type: Difficulty with smooth pursuit  bilaterally.    CN V   Facial sensation intact.     CN VII   Facial expression full, symmetric.     CN VIII   Hearing: intact    CN IX, X   Palate: symmetric    CN XI   CN XI normal.     CN XII   Tongue: not atrophic  Fasciculations: absent    MOTOR EXAM   Muscle bulk: normal  Overall muscle tone: normal    Strength   Right deltoid: 5/5  Left deltoid: 5/5  Right biceps: 5/5  Left biceps: 5/5  Right triceps: 5/5  Left triceps: 5/5  Right wrist extension: 5/5  Left wrist extension: 5/5  Right iliopsoas: 5/5  Left iliopsoas: 5/5  Right quadriceps: 5/5  Left quadriceps: 5/5  Right hamstrin/5  Left hamstrin/5  Right anterior tibial: 5/5  Left anterior tibial: 5/5  Right gastroc: 5/5  Left gastroc: 5/5    REFLEXES     Reflexes   Right brachioradialis: 3+  Left brachioradialis: 3+  Right biceps: 3+  Left biceps: 3+  Right triceps: 3+  Left triceps: 3+  Right patellar: 3+  Right achilles: 3+  Right plantar: upgoing  Left plantar: upgoing  Right Canela: present  Left Canela: present  Right ankle clonus: present  Left ankle clonus: present    SENSORY EXAM   Light touch normal.   Vibration normal.   Proprioception normal.        Temperature intact bilaterally     GAIT AND COORDINATION      Coordination   Finger to nose coordination: abnormal (L>R. Also with Disdiadochokinesis (L>R) )  Heel to shin coordination: abnormal (L>R)    Tremor   Resting tremor: absent  Intention tremor: absent      Significant Labs:   CBC:   Recent Labs  Lab 17  1639 17  0426   WBC 8.04 8.84   HGB 8.7* 8.2*   HCT 29.0* 27.3*   * 372*     CMP:   Recent Labs  Lab 17  1639 17  0426   GLU 96 89    139   K 3.3* 3.5    108   CO2 23 24   BUN 8 6   CREATININE 0.7 0.6   CALCIUM 8.4* 8.3*   MG 1.9 1.9   PROT 7.1  --    ALBUMIN 3.2*  --    BILITOT 0.1  --    ALKPHOS 78  --    AST 15  --    ALT 17  --    ANIONGAP 8 7*   EGFRNONAA >60.0 >60.0     Urine Studies: No results for input(s): COLORU, APPEARANCEUA,  PHUR, SPECGRAV, PROTEINUA, GLUCUA, KETONESU, BILIRUBINUA, OCCULTUA, NITRITE, UROBILINOGEN, LEUKOCYTESUR, RBCUA, WBCUA, BACTERIA, SQUAMEPITHEL, HYALINECASTS in the last 48 hours.    Invalid input(s): WRIGHTSUR     Long Chain Fatty Acids:    Ref. Range 3/3/2016 13:01 3/3/2016 13:02   C22:0 Latest Ref Range: <=96.3 nmol/mL 59.0     C24:0 Latest Ref Range: <=91.4 nmol/mL 37.9     C26:0 Latest Ref Range: <=1.30 nmol/mL 0.25     C24:0/C22:0 Latest Ref Range: <=1.39 ratio 0.64     C26:0/C22:0 Latest Ref Range: <=0.023 ratio 0.004     Pristanic Acid Latest Ref Range: <=2.98 nmol/mL 0.04     Phytanic Acid Latest Ref Range: <=9.88 nmol/mL 0.19     Pristanic/Phytanic Latest Ref Range: <=0.39 ratio 0.21     Long Chain Fatty Acids Unknown SEE BELOW     Acetylcarnitine Latest Ref Range: 5-29 umol/L   16      Arylsulfatase A,             108           nmol/h/mg  >=62   Leukocytes   Interpretation                                                 In this sample, arylsulfatase A activity is not reduced. This result indicates that this individual is not affected with either metachromatic leukodystrophy (MLD) or multiple sulfatase deficiency. If the clinical presentation is suggestive of MLD, consider urine sulfatide analysis (MML test CTSA) which is expected to be abnormal in patients with saposin B deficiency due to mutations in the prosaposin gene.     Alpha-Galactosidase, Leukocytes   Reason for Referral        Not provided   -------------------ADDITIONAL INFORMATION-------------------   Fluorometric Enzyme Assay   Alpha-Galactosidase,       66.9          nmol/h/mg  >=23.1   Leukocytes   In this specimen, the activity of alpha-galactosidase is normal, indicating this patient is most likely NOT a carrier for Fabry disease (OMIM 334032). However, please note that enzyme analysis is, in general, not sufficiently sensitive to detect all carrier females (see Rashaad REESE et al. Hum Mutat 2009; 30: 1-9; and Patricia MENDEZ et al. Mol Jaane Metab.  2008; 93: 112-128). Molecular genetic analysis of the GLA gene (MML test FABRZ/Fabry Disease, Full Gene Analysis) is the most reliable method of detecting carriers. If not already ordered, this could be done on the existing sample by calling MML within one month.     Ceramide Trihex and Sulfatide, U   In this sample, the pattern of elevated excretions of ceramide trihexoside species is suggestive of a biochemical diagnosis of Fabry disease (OMIM 119275). Please contact the Biochemical Genetics consultant or genetic counselor on call ([phone]) if you have any questions.     Other Labs: abnormal bolded  Carnitine Free 35  Carnitine Plasma 51  Pyruvic acid 0.047  HIV pcr negative  Ceruloplasmin 31  Copper, serum  Aldolase 12.8 (H)  Ammonia 34  ACE 29  Anti-thyroglobulin ab <4.0  Thiamine 28 (L)  Vitamin B6 20  Vitamin B2 3  Vitamin B3 2.31  Folate 12.8  NMO negative        OUTSIDE CSF (2/12/16):  WCB 8  CBC 32  Gluc 71  Prot 19  CMV neg  HSV1 igg 0.06  HSV2 igG 0.63  Serum hiv neg (unknown what specific test)      24hr EEG 3/2-3/3 without seizures, slow background (prelim read)      Significant Imaging: I have reviewed all pertinent imaging results/findings within the past 24 hours.   Slight interval progression of age advanced generalized cerebral volume loss and abnormal T2/FLAIR hyperintensities throughout the supratentorial white matter with more prominent involvement of the splenium and left periatrial white matter on today's examination.  Although nonspecific, these findings would be in keeping with patient's clinical diagnosis of underlying leukodystrophy.  Short-term followup is suggested.        Assessment and Plan:     * Leukodystrophy  Ms. Perales is a 31 year old woman with a complicated recent history, with a new diagnosis of leukodystrophy (though the cause is still undetermined). She may have Fabry disease, though her current tests for this have contradictory results and she has not had genetic  "testing due to complications with insurance. Her current presentation of "weakness" is more likely due to incoordination as seen on exam, and is consistent with the new lesions seen in her cerebellum. She has seen Neurology here as an outpatient, but has had issues due to insurance and will be better cared for at [facility]. However for her current symptoms, our recommendations are as below.    Recommendations:  -- MRA brain (no contrast needed) to evaluate for vascular abnormalities   -- 1000mg IV solumedrol daily x3 days  -- Physical and occupational therapy  -- Referral to Dr. Soni with [facility] upon discharge      VTE Risk Mitigation         Ordered     Medium Risk of VTE  Once      03/30/17 0021     Place sequential compression device  Until discontinued      03/30/17 0021          Thank you for your consult. I will follow-up with patient. Please contact us if you have any additional questions.       Kinjal Oneal MD  Neurology  [facility]  "

## 2017-03-30 NOTE — PROGRESS NOTES
Patient Consulted by CTTS:     The following was discussed by the Tobacco Treatment Specialist:  ? Relevance of Quitting  ? Risk to Health  ? Long Term Risk  ? Risk for Others  ? Rewards of Quitting  ? Motivation Intervention to Quit          Pt has never smoked.

## 2017-03-30 NOTE — ASSESSMENT & PLAN NOTE
- MRI showed slight interval progression of age advanced generalized cerebral volume loss and abnormal T2/FLAIR hyperintensities throughout the supratentorial white matter with more prominent involvement of the splenium and left periatrial white matter.  - Will consult neurology, PT/OT, SW.  - Supportive care, fall precautions, ambulate with assistance.  - Continue Baclofen 10mg TID, Paxil 20mg daily, and trazodone 50mg qHS.

## 2017-03-30 NOTE — PLAN OF CARE
Pt lives w her mother. Sees Dr. uD as her primary due to neuro problems.     03/30/17 0825   Discharge Assessment   Assessment Type Discharge Planning Assessment   Confirmed/corrected address and phone number on facesheet? Yes   Assessment information obtained from? Patient   Expected Length of Stay (days) 3   Communicated expected length of stay with patient/caregiver yes   Prior to hospitilization cognitive status: Alert/Oriented   Prior to hospitalization functional status: Assistive Equipment;Needs Assistance   Current cognitive status: Alert/Oriented   Current Functional Status: Assistive Equipment;Needs Assistance   Arrived From home or self-care   Lives With parent(s)   Patient's perception of discharge disposition home or selfcare   Patient currently being followed by outpatient case management? No   Patient currently receives home health services? No   Does the patient currently use HME? Yes   Equipment Currently Used at Home 3-in-1 commode;bedside commode;walker, rolling;wheelchair   Do you have any problems affording any of your prescribed medications? No   Is the patient taking medications as prescribed? yes   Do you have any financial concerns preventing you from receiving the healthcare you need? No   Does the patient have transportation to healthcare appointments? Yes   Transportation Available family or friend will provide   On Dialysis? No   Does the patient receive services at the Coumadin Clinic? No   Are there any open cases? No   Discharge Plan A Other  (has medicaid so possible outpt tx)   Discharge Plan B Other   Patient/Family In Agreement With Plan yes     Has ride home.

## 2017-03-30 NOTE — ASSESSMENT & PLAN NOTE
"Ms. Perales is a 31 year old woman with a complicated recent history, with a new diagnosis of leukodystrophy (though the cause is still undetermined). 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 Her current presentation of "weakness" is more likely due to incoordination as seen on exam, and is consistent with the new lesions seen in her cerebellum. She has seen Neurology here as an outpatient, but will not be able to follow long term due to insurance and will be better cared for at [facility]. However for her current symptoms, are recommendations are as below.    Recommendations:  -- 1000mg IV solumedrol daily x3 days  -- Physical and occupational therapy  -- Referral to Dr. Soni with [facility] upon discharge  "

## 2017-03-30 NOTE — PLAN OF CARE
Problem: Physical Therapy Goal  Goal: Physical Therapy Goal  Goals to be met by: 2017     Patient will increase functional independence with mobility by performin. Sit to stand transfer with Modified [place] using RW  2. Bed to chair transfer with Modified [place] using Rolling Walker  3. Gait x 300 feet with Modified [place] using Rolling Walker.   4. Ascend/descend 5 stair with bilateral Handrails and modified independence   5. Patient will be independent with Lower extremity exercise program per handout, to improve motor control and coordination for functional mobility.   Outcome: Ongoing (interventions implemented as appropriate)  Initial eval completed.  Discussed POC, Results and goals with patient.

## 2017-03-30 NOTE — PLAN OF CARE
Problem: Patient Care Overview  Goal: Plan of Care Review  Outcome: Ongoing (interventions implemented as appropriate)  Patient is awake, alert and oriented. Patient remained free from complaints this shift. Patient is ambulatory only with assistance she remains very shaky and unstable. Remained free from injury and falls this shift. Bed is in the low and locked position with side rail up x 2. Call light is within reach. Informed to call if need assistance. Will continue to monitor.

## 2017-03-30 NOTE — PT/OT/SLP EVAL
"Physical Therapy  Evaluation    Richard Perales   MRN: [id number]   Admitting Diagnosis: Leukodystrophy    PT Received On: 03/30/17  PT Start Time: 1100     PT Stop Time: 1139    PT Total Time (min): 39 min       Billable Minutes:  Evaluation 30 Moderate complexity  Therapeutic Exercise 9    Diagnosis: Leukodystrophy  Comorbidities that affect PT POC:  · Progressive functional decline over past few weeks  · Frequent falls at home  · Sedentary lifestyle 2* progressive weakness    Past Medical History:   Diagnosis Date    Leukodystrophy       History reviewed. No pertinent surgical history.    Referring physician: Donna Mcdonald PA-C  Date referred to PT: 3/29/2017    General Precautions: Standard, fall    Patient History:  Living Environment Comment: Patient lives with her mom in a 1 story home with 4 steps B rails at one entrance and 5 steps B rails at another entrance.  Until 1 week ago she was independent with ambulation without an assistive deivce, and dressed independently, but required assistance to get into and out of the tub.  Patient stated she usually stood to take a shower and did not need assistance for this.  Over the past couple weeks she has regressed to needing a  RW fot gait and has fallen several times at home.  Her mother is able to assist her and to provide transportation.     Equipment Currently Used at Home:  (Patient has a w/c, RW, BSC, and tub chair. )    Subjective:  Communicated with Rn prior to session.  "At home I spend most of my time lying on the bed or lying on the couch.  Is this bad?"  Chief Complaint: frequent falls and decreased balance  Patient goals: I don't wan to use a wheelchair.  I want to be able to walk.     Pain Rating:  (muscl epain reported in thighs with exercise. )    Objective:    Patient found supine in bed in NAD.  She agreed to therapy session.  Patient participated very well during session.  She did report some difficulty with memory.      Cognitive " Exam:  Oriented to: Person, Place, Time and Situation    Follows Commands/attention: Follows multistep  commands  Communication: clear/fluent  Safety awareness/insight to disability: intact    Physical Exam:  Postural examination/scapula alignment: No postural abnormalities identified    Skin integrity: Visible skin intact  Edema: None noted     Lower Extremity Range of Motion:  Right Lower Extremity: WFL  Left Lower Extremity: WFL    Lower Extremity Strength:  Right Lower Extremity: hips 4/5, otherwise 5/5  Left Lower Extremity: hips 4/5, otherwise 5/5    Neuromuscular:  1 beat clonus on L   2-3 beat clonus on R    Finger to nose:  · Dysmetria present bilaterally, but significantly impaired on L    Foot tapping: patient able to perform, but unable to maintain a steady rhythm  Heel-shin: patient only able to complete with significant reduction in speed, more impaired on L     Functional Mobility:  Bed Mobility:  Rolling/Turning to Left: Stand by assistance  Rolling/Turning Right: Stand by assistance  Scooting/Bridging: Stand by Assistance  Supine to Sit: Stand by Assistance  Sit to Supine: Stand by Assistance    Transfers:  Sit <> Stand Assistance: Supervision  Bed <> Chair Assistance:  (CGA with RW)  Chair<>Mat Assistance:  (CGA with RW)    Gait:   Gait Distance: 125 feet CGA with RW, ataxic gait pattern L>R.  Patient demonstrates good trunk control and is able to maintain appropriate  on RW, but has limb ataxia in BLEs (L>R) present during swing phase.  Patient unable to alter gait speed.     Stairs:  Patient ascended/descended 4 steps with bilateral hand rails and CGA.  Patient able to ascend with step over step gait patter, but descended with both feet on a single step.     Balance:   Static Sit: GOOD: Takes MODERATE challenges from all directions, but patient reports gradual L lean over long periods of time  Dynamic Sit: GOOD: Maintains balance through MODERATE excursions of active trunk movement  Static  Stand: FAIR: Maintains without assist but unable to take challenges  Dynamic stand: FAIR: Needs CONTACT GUARD during gait    Therapeutic Activities and Exercises:  Therapist instructed patient in role of PT and POC.      Patient performed supine LE exercises to assess movement quality, improve coordination and assess muscle fatigue after mobility activities:  · Ankle pumps 15x/ea  · Heel slides 15x/ea  · SLR 10x/ea    AM-PAC 6 CLICK MOBILITY  How much help from another person does this patient currently need?   1 = Unable, Total/Dependent Assistance  2 = A lot, Maximum/Moderate Assistance  3 = A little, Minimum/Contact Guard/Supervision  4 = None, Modified [place]/Independent    Turning over in bed (including adjusting bedclothes, sheets and blankets)?: 4  Sitting down on and standing up from a chair with arms (e.g., wheelchair, bedside commode, etc.): 4  Moving from lying on back to sitting on the side of the bed?: 3  Moving to and from a bed to a chair (including a wheelchair)?: 3  Need to walk in hospital room?: 3  Climbing 3-5 steps with a railing?: 3  Total Score: 20     AM-PAC Raw Score CMS G-Code Modifier Level of Impairment Assistance   6 % Total / Unable   7 - 9 CM 80 - 100% Maximal Assist   10 - 14 CL 60 - 80% Moderate Assist   15 - 19 CK 40 - 60% Moderate Assist   20 - 22 CJ 20 - 40% Minimal Assist   23 CI 1-20% SBA / CGA   24 CH 0% Independent/ Mod I     Patient left up in chair with all lines intact and call button in reach.    Clinical Presentation:  Evolving.  Patient with onset of worsening ataxia and frequent falls at home.     Assessment:   Richard Perales is a 31 y.o. female with a medical diagnosis of Leukodystrophy and presents with decreased functional mobility 2* ataxia and decreased balance.  Prior to recent decline, she was able to perform all mobility without an assistive device.  Now patient requires a RW for safety with all mobility 2* worsening ataxia.  Patient would benefit  from skilled PT services to improve mobility, balance, and coordination and decrease risk for falls.    Rehab identified problem list/impairments: Rehab identified problem list/impairments: gait instability, impaired balance, impaired functional mobilty, impaired coordination    Rehab potential is good.    Activity tolerance: Excellent    Discharge recommendations: Discharge Facility/Level Of Care Needs:  (oupatient PT and OT)     Barriers to discharge: Barriers to Discharge: None (Patient has 4-5 steps, but was able to safely ascned/descend with CGA and bilateral rails. )    Equipment recommendations: Equipment Needed After Discharge: none     GOALS:   Physical Therapy Goals        Problem: Physical Therapy Goal    Goal Priority Disciplines Outcome Goal Variances Interventions   Physical Therapy Goal     PT/OT, PT Ongoing (interventions implemented as appropriate)     Description:  Goals to be met by: 2017     Patient will increase functional independence with mobility by performin. Sit to stand transfer with Modified [place] using RW  2. Bed to chair transfer with Modified [place] using Rolling Walker  3. Gait  x 300 feet with Modified [place] using Rolling Walker.   4. Ascend/descend 5 stair with bilateral Handrails and modified independence   5. Patient will be independent with Lower extremity exercise program  per handout, to improve motor control and coordination for functional mobility.                   PLAN:    Patient to be seen 4 x/week to address the above listed problems via gait training, therapeutic activities, therapeutic exercises, neuromuscular re-education  Plan of Care expires: 17  Plan of Care reviewed with: patient          Amaya Pool, PT  2017

## 2017-03-30 NOTE — PLAN OF CARE
Problem: Patient Care Overview  Goal: Plan of Care Review  Outcome: Ongoing (interventions implemented as appropriate)  Pt is progressing with plan of care. Frequent rounds made to assess pain and safety. Pt skin free of breakdown, as pt turns independently. Pt ambulates with assist. Bed locked and at lowest position. Side rails up x 2. Call light within reach. Will continue to monitor.

## 2017-03-30 NOTE — SUBJECTIVE & OBJECTIVE
Past Medical History:   Diagnosis Date    Leukodystrophy        History reviewed. No pertinent surgical history.    Review of patient's allergies indicates:  No Known Allergies      No current facility-administered medications on file prior to encounter.      Current Outpatient Prescriptions on File Prior to Encounter   Medication Sig    baclofen (LIORESAL) 10 MG tablet Take 1 tablet (10 mg total) by mouth 3 (three) times daily.    paroxetine (PAXIL) 20 MG tablet Take 1 tablet (20 mg total) by mouth once daily.    trazodone (DESYREL) 50 MG tablet Take 1 tablet (50 mg total) by mouth every evening.     Family History     None        Social History Main Topics    Smoking status: Never Smoker    Smokeless tobacco: Not on file    Alcohol use Not on file    Drug use: Not on file    Sexual activity: Not on file     Review of Systems   Constitutional: Negative for chills and fever.   HENT: Negative for sore throat.    Eyes: Positive for visual disturbance. Negative for photophobia.   Respiratory: Negative for cough, chest tightness and shortness of breath.    Cardiovascular: Negative for leg swelling.   Gastrointestinal: Negative for abdominal pain, diarrhea, nausea and vomiting.   Endocrine: Negative for polyuria.   Genitourinary: Negative for dysuria and hematuria.   Musculoskeletal: Negative for arthralgias and myalgias.   Skin:        Patchy areas of dry skin   Neurological: Positive for weakness. Negative for headaches.     Objective:     Vital Signs (Most Recent):  Temp: 97.6 °F (36.4 °C) (03/30/17 0758)  Pulse: 69 (03/30/17 0758)  Resp: 12 (03/30/17 0758)  BP: 106/63 (03/30/17 0758)  SpO2: 100 % (03/30/17 0758) Vital Signs (24h Range):  Temp:  [97.6 °F (36.4 °C)-99.9 °F (37.7 °C)] 97.6 °F (36.4 °C)  Pulse:  [] 69  Resp:  [12-18] 12  SpO2:  [96 %-100 %] 100 %  BP: (106-142)/(63-86) 106/63     Weight: 88.9 kg (196 lb)  Body mass index is 33.64 kg/(m^2).    Physical Exam   Constitutional: She is oriented  to person, place, and time. She appears well-developed and well-nourished.   HENT:   Head: Normocephalic and atraumatic.   Eyes: EOM are normal. Pupils are equal, round, and reactive to light.   Pulmonary/Chest: Effort normal.   Neurological: She is oriented to person, place, and time. She has an abnormal Finger-Nose-Finger Test (L>R. Also with Disdiadochokinesis (L>R) ) and an abnormal Heel to Shin Test (L>R).   Reflex Scores:       Tricep reflexes are 3+ on the right side and 3+ on the left side.       Bicep reflexes are 3+ on the right side and 3+ on the left side.       Brachioradialis reflexes are 3+ on the right side and 3+ on the left side.       Patellar reflexes are 3+ on the right side.       Achilles reflexes are 3+ on the right side.  Skin: Skin is warm.   Psychiatric: She has a normal mood and affect. Her speech is normal.   Nursing note and vitals reviewed.      NEUROLOGICAL EXAMINATION:     MENTAL STATUS   Oriented to person, place, and time.   Registration: recalls 3 of 3 objects. Recall at 5 minutes: recalls 3 of 3 objects.   Attention: normal. Concentration: normal.   Speech: speech is normal   Level of consciousness: alert    CRANIAL NERVES     CN III, IV, VI   Pupils are equal, round, and reactive to light.  Extraocular motions are normal.   Nystagmus type: Difficulty with smooth pursuit bilaterally.    CN V   Facial sensation intact.     CN VII   Facial expression full, symmetric.     CN VIII   Hearing: intact    CN IX, X   Palate: symmetric    CN XI   CN XI normal.     CN XII   Tongue: not atrophic  Fasciculations: absent    MOTOR EXAM   Muscle bulk: normal  Overall muscle tone: normal    Strength   Right deltoid: 5/5  Left deltoid: 5/5  Right biceps: 5/5  Left biceps: 5/5  Right triceps: 5/5  Left triceps: 5/5  Right wrist extension: 5/5  Left wrist extension: 5/5  Right iliopsoas: 5/5  Left iliopsoas: 5/5  Right quadriceps: 5/5  Left quadriceps: 5/5  Right hamstrin/5  Left hamstring:  5/5  Right anterior tibial: 5/5  Left anterior tibial: 5/5  Right gastroc: 5/5  Left gastroc: 5/5    REFLEXES     Reflexes   Right brachioradialis: 3+  Left brachioradialis: 3+  Right biceps: 3+  Left biceps: 3+  Right triceps: 3+  Left triceps: 3+  Right patellar: 3+  Right achilles: 3+  Right plantar: upgoing  Left plantar: upgoing  Right Canela: present  Left Canela: present  Right ankle clonus: present  Left ankle clonus: present    SENSORY EXAM   Light touch normal.   Vibration normal.   Proprioception normal.        Temperature intact bilaterally     GAIT AND COORDINATION      Coordination   Finger to nose coordination: abnormal (L>R. Also with Disdiadochokinesis (L>R) )  Heel to shin coordination: abnormal (L>R)    Tremor   Resting tremor: absent  Intention tremor: absent      Significant Labs:   CBC:   Recent Labs  Lab 03/29/17  1639 03/30/17  0426   WBC 8.04 8.84   HGB 8.7* 8.2*   HCT 29.0* 27.3*   * 372*     CMP:   Recent Labs  Lab 03/29/17  1639 03/30/17  0426   GLU 96 89    139   K 3.3* 3.5    108   CO2 23 24   BUN 8 6   CREATININE 0.7 0.6   CALCIUM 8.4* 8.3*   MG 1.9 1.9   PROT 7.1  --    ALBUMIN 3.2*  --    BILITOT 0.1  --    ALKPHOS 78  --    AST 15  --    ALT 17  --    ANIONGAP 8 7*   EGFRNONAA >60.0 >60.0     Urine Studies: No results for input(s): COLORU, APPEARANCEUA, PHUR, SPECGRAV, PROTEINUA, GLUCUA, KETONESU, BILIRUBINUA, OCCULTUA, NITRITE, UROBILINOGEN, LEUKOCYTESUR, RBCUA, WBCUA, BACTERIA, SQUAMEPITHEL, HYALINECASTS in the last 48 hours.    Invalid input(s): WRIGHTSUR     Long Chain Fatty Acids:    Ref. Range 3/3/2016 13:01 3/3/2016 13:02   C22:0 Latest Ref Range: <=96.3 nmol/mL 59.0     C24:0 Latest Ref Range: <=91.4 nmol/mL 37.9     C26:0 Latest Ref Range: <=1.30 nmol/mL 0.25     C24:0/C22:0 Latest Ref Range: <=1.39 ratio 0.64     C26:0/C22:0 Latest Ref Range: <=0.023 ratio 0.004     Pristanic Acid Latest Ref Range: <=2.98 nmol/mL 0.04     Phytanic Acid Latest Ref  Range: <=9.88 nmol/mL 0.19     Pristanic/Phytanic Latest Ref Range: <=0.39 ratio 0.21     Long Chain Fatty Acids Unknown SEE BELOW     Acetylcarnitine Latest Ref Range: 5-29 umol/L   16      Arylsulfatase A,             108           nmol/h/mg  >=62   Leukocytes   Interpretation                                                 In this sample, arylsulfatase A activity is not reduced. This result indicates that this individual is not affected with either metachromatic leukodystrophy (MLD) or multiple sulfatase deficiency. If the clinical presentation is suggestive of MLD, consider urine sulfatide analysis (MML test CTSA) which is expected to be abnormal in patients with saposin B deficiency due to mutations in the prosaposin gene.     Alpha-Galactosidase, Leukocytes   Reason for Referral        Not provided   -------------------ADDITIONAL INFORMATION-------------------   Fluorometric Enzyme Assay   Alpha-Galactosidase,       66.9          nmol/h/mg  >=23.1   Leukocytes   In this specimen, the activity of alpha-galactosidase is normal, indicating this patient is most likely NOT a carrier for Fabry disease (OMIM 680523). However, please note that enzyme analysis is, in general, not sufficiently sensitive to detect all carrier females (see Johnnyu WL et al. Hum Mutat 2009; 30: 1-9; and Patricia WR et al. Mol Janae Metab. 2008; 93: 112-128). Molecular genetic analysis of the GLA gene (MML test FABRZ/Fabry Disease, Full Gene Analysis) is the most reliable method of detecting carriers. If not already ordered, this could be done on the existing sample by calling MML within one month.     Ceramide Trihex and Sulfatide, U   In this sample, the pattern of elevated excretions of ceramide trihexoside species is suggestive of a biochemical diagnosis of Fabry disease (OMIM 620502). Please contact the Biochemical Genetics consultant or genetic counselor on call ([phone]) if you have any questions.     Other Labs: abnormal  bolded  Carnitine Free 35  Carnitine Plasma 51  Pyruvic acid 0.047  HIV pcr negative  Ceruloplasmin 31  Copper, serum  Aldolase 12.8 (H)  Ammonia 34  ACE 29  Anti-thyroglobulin ab <4.0  Thiamine 28 (L)  Vitamin B6 20  Vitamin B2 3  Vitamin B3 2.31  Folate 12.8  NMO negative        OUTSIDE CSF (2/12/16):  WCB 8  CBC 32  Gluc 71  Prot 19  CMV neg  HSV1 igg 0.06  HSV2 igG 0.63  Serum hiv neg (unknown what specific test)      24hr EEG 3/2-3/3 without seizures, slow background (prelim read)      Significant Imaging: I have reviewed all pertinent imaging results/findings within the past 24 hours.   Slight interval progression of age advanced generalized cerebral volume loss and abnormal T2/FLAIR hyperintensities throughout the supratentorial white matter with more prominent involvement of the splenium and left periatrial white matter on today's examination.  Although nonspecific, these findings would be in keeping with patient's clinical diagnosis of underlying leukodystrophy.  Short-term followup is suggested.

## 2017-03-30 NOTE — SUBJECTIVE & OBJECTIVE
Past Medical History:   Diagnosis Date    Leukodystrophy        History reviewed. No pertinent surgical history.    Review of patient's allergies indicates:  No Known Allergies    No current facility-administered medications on file prior to encounter.      Current Outpatient Prescriptions on File Prior to Encounter   Medication Sig    baclofen (LIORESAL) 10 MG tablet Take 1 tablet (10 mg total) by mouth 3 (three) times daily.    paroxetine (PAXIL) 20 MG tablet Take 1 tablet (20 mg total) by mouth once daily.    trazodone (DESYREL) 50 MG tablet Take 1 tablet (50 mg total) by mouth every evening.     Family History     None        Social History Main Topics    Smoking status: Never Smoker    Smokeless tobacco: Not on file    Alcohol use Not on file    Drug use: Not on file    Sexual activity: Not on file     Review of Systems   Constitutional: Positive for activity change. Negative for appetite change, chills, fatigue, fever and unexpected weight change.   HENT: Negative for congestion, rhinorrhea, sore throat, trouble swallowing and voice change.    Eyes: Negative for visual disturbance.   Respiratory: Negative for cough, choking, chest tightness, shortness of breath and wheezing.    Cardiovascular: Negative for chest pain, palpitations and leg swelling.   Gastrointestinal: Negative for abdominal distention, abdominal pain, anal bleeding, blood in stool, constipation, diarrhea, nausea and vomiting.   Endocrine: Negative for cold intolerance, heat intolerance, polydipsia and polyuria.   Genitourinary: Negative for dysuria, flank pain, frequency, hematuria and urgency.   Musculoskeletal: Negative for arthralgias, back pain, joint swelling and myalgias.   Skin: Negative for color change and rash.   Neurological: Positive for weakness. Negative for dizziness, seizures, syncope, facial asymmetry, speech difficulty, light-headedness, numbness and headaches.   Hematological: Negative for adenopathy. Does not  bruise/bleed easily.   Psychiatric/Behavioral: Negative for agitation, confusion, hallucinations and suicidal ideas.     Objective:     Vital Signs (Most Recent):  Temp: 99.9 °F (37.7 °C) (03/29/17 1724)  Pulse: 90 (03/29/17 1724)  Resp: 16 (03/29/17 1724)  BP: (!) 142/81 (03/29/17 1724)  SpO2: 100 % (03/29/17 1724) Vital Signs (24h Range):  Temp:  [99.2 °F (37.3 °C)-99.9 °F (37.7 °C)] 99.9 °F (37.7 °C)  Pulse:  [] 90  Resp:  [16-18] 16  SpO2:  [99 %-100 %] 100 %  BP: (130-142)/(68-81) 142/81        There is no height or weight on file to calculate BMI.    Physical Exam   Constitutional: She is oriented to person, place, and time. She appears well-developed and well-nourished. No distress.   HENT:   Head: Normocephalic and atraumatic.   Eyes: Pupils are equal, round, and reactive to light.   Neck: Neck supple. Carotid bruit is not present. No thyromegaly present.   Cardiovascular: Normal rate and regular rhythm.  Exam reveals no gallop.    No murmur heard.  Pulmonary/Chest: Effort normal and breath sounds normal. No respiratory distress. She has no wheezes.   Abdominal: Soft. Bowel sounds are normal. She exhibits no distension and no mass. There is no splenomegaly or hepatomegaly. There is no tenderness.   Musculoskeletal: Normal range of motion. She exhibits no edema or deformity.   Neurological: She is alert and oriented to person, place, and time. No cranial nerve deficit or sensory deficit.   5/5 RLE strength, 4/5 LLE strength, 5/5 BUE strength   Skin: Skin is warm and dry. No rash noted.   Psychiatric: She has a normal mood and affect.        Significant Labs: All pertinent labs within the past 24 hours have been reviewed.    Significant Imaging: I have reviewed all pertinent imaging results/findings within the past 24 hours.

## 2017-03-31 LAB
BASOPHILS # BLD AUTO: 0 K/UL
BASOPHILS NFR BLD: 0 %
DIFFERENTIAL METHOD: ABNORMAL
EOSINOPHIL # BLD AUTO: 0 K/UL
EOSINOPHIL NFR BLD: 0 %
ERYTHROCYTE [DISTWIDTH] IN BLOOD BY AUTOMATED COUNT: 18 %
FERRITIN SERPL-MCNC: 15 NG/ML
FOLATE SERPL-MCNC: 20.2 NG/ML
HCT VFR BLD AUTO: 27.6 %
HGB BLD-MCNC: 8.7 G/DL
IRON SERPL-MCNC: 17 UG/DL
LYMPHOCYTES # BLD AUTO: 1.3 K/UL
LYMPHOCYTES NFR BLD: 15.7 %
MCH RBC QN AUTO: 22.2 PG
MCHC RBC AUTO-ENTMCNC: 31.5 %
MCV RBC AUTO: 70 FL
MONOCYTES # BLD AUTO: 0.4 K/UL
MONOCYTES NFR BLD: 4.2 %
NEUTROPHILS # BLD AUTO: 6.7 K/UL
NEUTROPHILS NFR BLD: 79.9 %
PLATELET # BLD AUTO: 409 K/UL
PMV BLD AUTO: 8.7 FL
RBC # BLD AUTO: 3.92 M/UL
RETICS/RBC NFR AUTO: 1.9 %
SATURATED IRON: 4 %
TOTAL IRON BINDING CAPACITY: 404 UG/DL
TRANSFERRIN SERPL-MCNC: 273 MG/DL
VIT B12 SERPL-MCNC: 888 PG/ML
WBC # BLD AUTO: 8.41 K/UL

## 2017-03-31 PROCEDURE — 82607 VITAMIN B-12: CPT

## 2017-03-31 PROCEDURE — 85045 AUTOMATED RETICULOCYTE COUNT: CPT

## 2017-03-31 PROCEDURE — 36415 COLL VENOUS BLD VENIPUNCTURE: CPT

## 2017-03-31 PROCEDURE — 85025 COMPLETE CBC W/AUTO DIFF WBC: CPT

## 2017-03-31 PROCEDURE — 99233 SBSQ HOSP IP/OBS HIGH 50: CPT | Mod: ,,, | Performed by: HOSPITALIST

## 2017-03-31 PROCEDURE — 25000003 PHARM REV CODE 250: Performed by: PHYSICIAN ASSISTANT

## 2017-03-31 PROCEDURE — 82746 ASSAY OF FOLIC ACID SERUM: CPT

## 2017-03-31 PROCEDURE — 63600175 PHARM REV CODE 636 W HCPCS: Performed by: HOSPITALIST

## 2017-03-31 PROCEDURE — 25000003 PHARM REV CODE 250: Performed by: HOSPITALIST

## 2017-03-31 PROCEDURE — 11000001 HC ACUTE MED/SURG PRIVATE ROOM

## 2017-03-31 PROCEDURE — 82728 ASSAY OF FERRITIN: CPT

## 2017-03-31 PROCEDURE — 99233 SBSQ HOSP IP/OBS HIGH 50: CPT | Mod: ,,, | Performed by: PSYCHIATRY & NEUROLOGY

## 2017-03-31 PROCEDURE — 94760 N-INVAS EAR/PLS OXIMETRY 1: CPT

## 2017-03-31 PROCEDURE — 83540 ASSAY OF IRON: CPT

## 2017-03-31 PROCEDURE — 84425 ASSAY OF VITAMIN B-1: CPT

## 2017-03-31 RX ADMIN — PAROXETINE HYDROCHLORIDE 20 MG: 10 TABLET, FILM COATED ORAL at 08:03

## 2017-03-31 RX ADMIN — BACLOFEN 10 MG: 10 TABLET ORAL at 05:03

## 2017-03-31 RX ADMIN — THERA TABS 1 TABLET: TAB at 01:03

## 2017-03-31 RX ADMIN — BACLOFEN 10 MG: 10 TABLET ORAL at 01:03

## 2017-03-31 RX ADMIN — THIAMINE HYDROCHLORIDE 100 MG: 100 INJECTION, SOLUTION INTRAMUSCULAR; INTRAVENOUS at 03:03

## 2017-03-31 RX ADMIN — BACLOFEN 10 MG: 10 TABLET ORAL at 09:03

## 2017-03-31 RX ADMIN — Medication 1 TABLET: at 01:03

## 2017-03-31 RX ADMIN — TRAZODONE HYDROCHLORIDE 50 MG: 50 TABLET ORAL at 09:03

## 2017-03-31 RX ADMIN — DEXTROSE: 50 INJECTION, SOLUTION INTRAVENOUS at 03:03

## 2017-03-31 NOTE — PROGRESS NOTES
"Progress Note  [facility] Medicine      Admit Date: 3/29/2017    SUBJECTIVE:     Follow-up For:  Leukodystrophy    HPI and Hospital Course: Per neuro consult, "Ms. Perales is a 31 year old woman with a complicated history, who is admitted for worsening weakness. She initially presented to [facility] in February 2016, complaining of visual changes (unable to describe, ?blurry) and generalized weakness since October 2015. MRI brain showed non-specific white matter changes concerning for leukodystrophy vs demyelination. She was transferred to [facility] for further workup in March 2016. Her symptoms progressed to her being nonverbal and paraplegic, and she would reportedly alternate between catatonia and severe weakness. Workup included LP which was unremarkable, including negative for MS. Autoimmune, porphyria and infectious workup was also negative (including HIV). She did have a breast abscess that was aspirated, and subsequent bacteremia (staph) while at [facility] that was treated with IV antibiotics. She also developed a scaly rash, and biopsy showed "pityriasis rubra pilaris".      She also had an EEG and EMG which were negative. Diagnosis was unclear, but the differential was a leukodystrophy. She was treated with IV steroids with a PO taper, to which she responded well. She was discharged to inpatient rehab, where she regained strength and was eventually able to walk unassisted. She has been following with Dr. Du as an outpatient, who is continuing with investigations including genetic studies. She had contradicting results for Fabry disease, including normal alpha-galactoside activity but increased ceramide trihexoside species.      She says that when she returned from her Neurology appointment last week, she had a fall due to lower extremity weakness. She says since then she has been ambulating with a walker and had a few falls. She says that she feels her strength is there while lying, but she feels weak when " "she stands up. She also describes a sensation of her nerves shaking and says her vision has worsened. She is unable to characterize her vision changes, but says "you could say it's blurry" even with her glasses."    Interval History: Started solumedrol last night, thus today is day 2 of 3-5 days, states that her speech is improved but otherwise similar to admit.  Starting empiric IV thiamine. Has anemia - denies BPR - states she has never had this diagnosis before, LMP beginning of March, uses about 5 pads daily for 5-7 days.     Review of Systems:  Pain Scale: 0 /10   Constitutional: no fever or chills  Respiratory: no cough or shortness of breath  Cardiovascular: no chest pain or palpitations  Gastrointestinal: no nausea or vomiting, no abdominal pain or change in bowel habits  Genitourinary: no hematuria or dysuria  Integument/Breast: no rash or pruritis  Hematologic/Lymphatic: no easy bruising or lymphadenopathy  Musculoskeletal: no arthralgias or myalgias  Neurological: no seizures or tremors  Behavioral/Psych: no depression or anxiety    OBJECTIVE:     Vital Signs Range (Last 24H):  Temp:  [97.6 °F (36.4 °C)-99 °F (37.2 °C)]   Pulse:  [70-89]   Resp:  [15-16]   BP: (111-139)/(70-81)   SpO2:  [95 %-100 %]     I & O (Last 24H):  Intake/Output Summary (Last 24 hours) at 03/31/17 1156  Last data filed at 03/31/17 0445   Gross per 24 hour   Intake             2010 ml   Output                0 ml   Net             2010 ml       Physical Exam:  General appearance: no distress  Mental status: Alert and oriented x 3  HEENT:  conjunctivae/corneas clear, PERRL  Neck: supple, thyroid not enlarged  Pulm:   normal respiratory effort, CTA B, no c/w/r  Card: RRR, S1, S2 normal, no murmur, click, rub or gallop  Abd: soft, NT, ND, BS present; no masses, no organomegaly  Ext: no c/c/e  Pulses: 2+, symmetric  Skin: color, texture, turgor normal. No rashes or lesions  Neuro: CN II-XII grossly intact, with nystagmus and " "dysdiadochokinesis (L > R) and abnormal heel to shin (L>R)    Diagnostic Results:  Labs reviewed    Recent Results (from the past 24 hour(s))   CBC with Automated Differential    Collection Time: 03/31/17  5:18 AM   Result Value Ref Range    WBC 8.41 3.90 - 12.70 K/uL    RBC 3.92 (L) 4.00 - 5.40 M/uL    Hemoglobin 8.7 (L) 12.0 - 16.0 g/dL    Hematocrit 27.6 (L) 37.0 - 48.5 %    MCV 70 (L) 82 - 98 fL    MCH 22.2 (L) 27.0 - 31.0 pg    MCHC 31.5 (L) 32.0 - 36.0 %    RDW 18.0 (H) 11.5 - 14.5 %    Platelets 409 (H) 150 - 350 K/uL    MPV 8.7 (L) 9.2 - 12.9 fL    Gran # 6.7 1.8 - 7.7 K/uL    Lymph # 1.3 1.0 - 4.8 K/uL    Mono # 0.4 0.3 - 1.0 K/uL    Eos # 0.0 0.0 - 0.5 K/uL    Baso # 0.00 0.00 - 0.20 K/uL    Gran% 79.9 (H) 38.0 - 73.0 %    Lymph% 15.7 (L) 18.0 - 48.0 %    Mono% 4.2 4.0 - 15.0 %    Eosinophil% 0.0 0.0 - 8.0 %    Basophil% 0.0 0.0 - 1.9 %    Differential Method Automated            ASSESSMENT/PLAN:     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 Per Dr Mcgregor (neuro staff), "Pt with unknown glycogen/lysosomal storage deficiency, and known to Movement disorder division of neurology. Pt with difficulty ambulating 2 days ago. Likely more related to cerebellar changes seen on MRI than actual weakness. Cerebellar signs noted in extraocular eye movement, speech and left > right UE and LE movement. At this time, I have spoken with the movement disorders division doctors who previously saw her and we have agreed that as she has had trouble with her insurance at our facility, she should follow up with Dr. Soni at [facility], and she would likely benefit from 3-5 days of IV steroids. Neurology to follow and determine if she needs 3 days or 5 days of treatment. She will also likely need genetics consult as an outpatient."  - solumedrol 1g IV x 3-5 days, f/u neuro recs  - home baclofen 10 TID  - MRI brain with and without contrast "Slight interval progression of age advanced " "generalized cerebral volume loss and abnormal T2/FLAIR hyperintensities throughout the supratentorial white matter with more prominent involvement of the splenium and left periatrial white matter on today's examination.  Although nonspecific, these findings would be in keeping with patient's clinical diagnosis of underlying leukodystrophy." MRA without contrast unremarkable  - start empiric thiamine 100mg IV daily and transition to PO upon d/c. Thiamine level added on to morning labs - will likely  not be resulted at time of d/c. Start B12, folate, MVI as well  - PT/OT  - [facility] and      Anemia, microcytic.  No overt bleeding.  Likely anemia of acute on chronic illness. She is weak (due to neuro disorder as above) so will monitor closely for need for transfusion.   - check Fe studies,   - defer to outpatient setting for further testing such as Hb electrophoresis and BMBx    Mood disorder  - home paroxetine 20  - home trazodone 50 qhs    albuterol-ipratropium 2.5mg-0.5mg/3mL nebulizer solution 3 mL, 3 mL, Nebulization, Q4H PRN    Prophylaxis-  enoxaparin    Advance directives - full code    Post-acute care- pending clinical condition, home with outpt rehab Rx and close f/u at [facility] (Dr Soni)    Time spent in care of patient: 45 mins    Raine Benito MD  Hospital Medicine Staff          "

## 2017-03-31 NOTE — SUBJECTIVE & OBJECTIVE
Subjective:     Interval History: No events overnight. She reports that her symptoms are stable, though feels her speech is a little better. She ambulated this morning with similar difficulty.      Current Facility-Administered Medications   Medication Dose Route Frequency Provider Last Rate Last Dose    acetaminophen tablet 650 mg  650 mg Oral Q4H PRN Donna Mcdonald PA-C        albuterol-ipratropium 2.5mg-0.5mg/3mL nebulizer solution 3 mL  3 mL Nebulization Q4H PRN Donna Mcdonald PA-C        baclofen tablet 10 mg  10 mg Oral TID Donna Mcdonald PA-C   10 mg at 03/31/17 0528    dextrose 50% injection 12.5 g  12.5 g Intravenous PRN oDnna Mcdonadl PA-C        dextrose 50% injection 25 g  25 g Intravenous PRN Donna Mcdonald PA-C        glucagon (human recombinant) injection 1 mg  1 mg Intramuscular PRN Donna Mcdonald PA-C        glucose chewable tablet 16 g  16 g Oral PRN Donna Mcdonald PA-C        glucose chewable tablet 24 g  24 g Oral PRN Donna Mcdonald PA-C        loperamide capsule 2 mg  2 mg Oral PRN Donna Mcdonald PA-C        methylPREDNISolone sodium succinate (SOLU-MEDROL) 1,000 mg in dextrose 5 % 100 mL IVPB   Intravenous Daily Raine Benito MD        ondansetron disintegrating tablet 8 mg  8 mg Oral Q8H PRN Donna Mcdonald PA-C        paroxetine tablet 20 mg  20 mg Oral Daily Donna Mcdonald PA-C   20 mg at 03/31/17 0843    polyethylene glycol packet 17 g  17 g Oral TID PRN Donna Mcdonald PA-C        promethazine tablet 25 mg  25 mg Oral Q6H PRN Donna Mcdonald PA-C        ramelteon tablet 8 mg  8 mg Oral Nightly PRN Donna Mcdonald PA-C        trazodone tablet 50 mg  50 mg Oral QHS MICHELLE Del ValleC   50 mg at 03/30/17 2151       Review of Systems   Constitutional: Negative for fever.   Gastrointestinal: Negative for abdominal pain.   Musculoskeletal: Negative for arthralgias and myalgias.    Psychiatric/Behavioral: Negative for agitation.     Objective:     Vital Signs (Most Recent):  Temp: 98.1 °F (36.7 °C) (03/31/17 0755)  Pulse: 75 (03/31/17 0755)  Resp: 16 (03/31/17 0755)  BP: 112/70 (03/31/17 0755)  SpO2: 95 % (03/31/17 0755) Vital Signs (24h Range):  Temp:  [97.6 °F (36.4 °C)-99 °F (37.2 °C)] 98.1 °F (36.7 °C)  Pulse:  [70-89] 75  Resp:  [15-16] 16  SpO2:  [95 %-100 %] 95 %  BP: (111-139)/(70-81) 112/70     Weight: 88.9 kg (196 lb)  Body mass index is 33.64 kg/(m^2).    Physical Exam   Constitutional: She is oriented to person, place, and time. She appears well-developed and well-nourished.   Eyes: EOM are normal. Pupils are equal, round, and reactive to light.   Pulmonary/Chest: Effort normal.   Neurological: She is oriented to person, place, and time. She has an abnormal Finger-Nose-Finger Test (L>R. Also with Disdiadochokinesis (L>R) ) and an abnormal Heel to Gomez Test (L>R).   Psychiatric: Her speech is normal.       NEUROLOGICAL EXAMINATION:     MENTAL STATUS   Oriented to person, place, and time.   Speech: speech is normal   Level of consciousness: alert    CRANIAL NERVES     CN III, IV, VI   Pupils are equal, round, and reactive to light.  Extraocular motions are normal.   Nystagmus type: Difficulty with smooth pursuit bilaterally.    CN VII   Facial expression full, symmetric.     CN XI   CN XI normal.     MOTOR EXAM   Muscle bulk: normal  Overall muscle tone: normal    SENSORY EXAM   Light touch normal.        Temperature intact bilaterally     GAIT AND COORDINATION      Coordination   Finger to nose coordination: abnormal (L>R. Also with Disdiadochokinesis (L>R) )  Heel to shin coordination: abnormal (L>R)    Tremor   Resting tremor: absent  Intention tremor: absent      Significant Labs:   CBC:   Recent Labs  Lab 03/29/17  1639 03/30/17  0426 03/31/17  0518   WBC 8.04 8.84 8.41   HGB 8.7* 8.2* 8.7*   HCT 29.0* 27.3* 27.6*   * 372* 409*     CMP:   Recent Labs  Lab 03/29/17  6289  03/30/17  0426   GLU 96 89    139   K 3.3* 3.5    108   CO2 23 24   BUN 8 6   CREATININE 0.7 0.6   CALCIUM 8.4* 8.3*   MG 1.9 1.9   PROT 7.1  --    ALBUMIN 3.2*  --    BILITOT 0.1  --    ALKPHOS 78  --    AST 15  --    ALT 17  --    ANIONGAP 8 7*   EGFRNONAA >60.0 >60.0       Long Chain Fatty Acids:     Ref. Range 3/3/2016 13:01 3/3/2016 13:02   C22:0 Latest Ref Range: <=96.3 nmol/mL 59.0      C24:0 Latest Ref Range: <=91.4 nmol/mL 37.9      C26:0 Latest Ref Range: <=1.30 nmol/mL 0.25      C24:0/C22:0 Latest Ref Range: <=1.39 ratio 0.64      C26:0/C22:0 Latest Ref Range: <=0.023 ratio 0.004      Pristanic Acid Latest Ref Range: <=2.98 nmol/mL 0.04      Phytanic Acid Latest Ref Range: <=9.88 nmol/mL 0.19      Pristanic/Phytanic Latest Ref Range: <=0.39 ratio 0.21      Long Chain Fatty Acids Unknown SEE BELOW      Acetylcarnitine Latest Ref Range: 5-29 umol/L    16       Arylsulfatase A,             108           nmol/h/mg  >=62   Leukocytes   Interpretation                                                 In this sample, arylsulfatase A activity is not reduced. This result indicates that this individual is not affected with either metachromatic leukodystrophy (MLD) or multiple sulfatase deficiency. If the clinical presentation is suggestive of MLD, consider urine sulfatide analysis (MML test CTSA) which is expected to be abnormal in patients with saposin B deficiency due to mutations in the prosaposin gene.      Alpha-Galactosidase, Leukocytes   Reason for Referral        Not provided   -------------------ADDITIONAL INFORMATION-------------------   Fluorometric Enzyme Assay   Alpha-Galactosidase,       66.9          nmol/h/mg  >=23.1   Leukocytes   In this specimen, the activity of alpha-galactosidase is normal, indicating this patient is most likely NOT a carrier for Fabry disease (OMIM [id number]). However, please note that enzyme analysis is, in general, not sufficiently sensitive to detect all carrier  females (see Rashaad WL et al. Hum Mutat 2009; 30: 1-9; and Patricia WR et al. Mol Janae Metab. 2008; 93: 112-128). Molecular genetic analysis of the GLA gene (MML test FABRZ/Fabry Disease, Full Gene Analysis) is the most reliable method of detecting carriers. If not already ordered, this could be done on the existing sample by calling MML within one month.      Ceramide Trihex and Sulfatide, U   In this sample, the pattern of elevated excretions of ceramide trihexoside species is suggestive of a biochemical diagnosis of Fabry disease (OMIM [id number]). Please contact the Biochemical Genetics consultant or genetic counselor on call ([phone]) if you have any questions.      Other Labs: abnormal bolded  Carnitine Free 35  Carnitine Plasma 51  Pyruvic acid 0.047  HIV pcr negative  Ceruloplasmin 31  Copper, serum  Aldolase 12.8 (H)  Ammonia 34  ACE 29  Anti-thyroglobulin ab <4.0  Thiamine 28 (L)  Vitamin B6 20  Vitamin B2 3  Vitamin B3 2.31  Folate 12.8  NMO negative         OUTSIDE CSF (2/12/16):  WCB 8  CBC 32  Gluc 71  Prot 19  CMV neg  HSV1 igg 0.06  HSV2 igG 0.63  Serum hiv neg (unknown what specific test)        24hr EEG 3/2-3/3 without seizures, slow background (prelim read)        Significant Imaging: I have reviewed all pertinent imaging results/findings within the past 24 hours.   Slight interval progression of age advanced generalized cerebral volume loss and abnormal T2/FLAIR hyperintensities throughout the supratentorial white matter with more prominent involvement of the splenium and left periatrial white matter on today's examination.  Although nonspecific, these findings would be in keeping with patient's clinical diagnosis of underlying leukodystrophy.  Short-term followup is suggested.

## 2017-03-31 NOTE — PROGRESS NOTES
[facility]  Neurology  Progress Note    Patient Name: Richard Perales  MRN: [id number]  Admission Date: 3/29/2017  Hospital Length of Stay: 2 days  Code Status: Full Code   Attending Provider: Raine Benito MD  Primary Care Physician: Primary Doctor No   Principal Problem:Leukodystrophy      Subjective:     Interval History: No events overnight. She reports that her symptoms are stable, though feels her speech is a little better. She ambulated this morning with similar difficulty.      Current Facility-Administered Medications   Medication Dose Route Frequency Provider Last Rate Last Dose    acetaminophen tablet 650 mg  650 mg Oral Q4H PRN Donna Mcdonald PA-C        albuterol-ipratropium 2.5mg-0.5mg/3mL nebulizer solution 3 mL  3 mL Nebulization Q4H PRN Donna Mcdonald PA-C        baclofen tablet 10 mg  10 mg Oral TID Donna Mcdonald PA-C   10 mg at 03/31/17 0528    dextrose 50% injection 12.5 g  12.5 g Intravenous PRN Donna Mcdonald PA-C        dextrose 50% injection 25 g  25 g Intravenous PRN Donna Mcdonald PA-C        glucagon (human recombinant) injection 1 mg  1 mg Intramuscular PRN Donna Mcdonald PA-C        glucose chewable tablet 16 g  16 g Oral PRN Donna Mcdonald PA-C        glucose chewable tablet 24 g  24 g Oral PRN Donna Mcdonald PA-C        loperamide capsule 2 mg  2 mg Oral PRN Donna Mcdonald PA-C        methylPREDNISolone sodium succinate (SOLU-MEDROL) 1,000 mg in dextrose 5 % 100 mL IVPB   Intravenous Daily Raine Benito MD        ondansetron disintegrating tablet 8 mg  8 mg Oral Q8H PRN Donna Mcdonald PA-C        paroxetine tablet 20 mg  20 mg Oral Daily Donna Mcdonald PA-C   20 mg at 03/31/17 0843    polyethylene glycol packet 17 g  17 g Oral TID PRN Donna Mcdonald PA-C        promethazine tablet 25 mg  25 mg Oral Q6H PRN Donna Mcdonald PA-C        ramelteon tablet 8 mg  8 mg Oral  Nightly PRN Donna Mcdonald PA-C        trazodone tablet 50 mg  50 mg Oral QHS Donna Mcdonald PA-C   50 mg at 03/30/17 2151       Review of Systems   Constitutional: Negative for fever.   Gastrointestinal: Negative for abdominal pain.   Musculoskeletal: Negative for arthralgias and myalgias.   Psychiatric/Behavioral: Negative for agitation.     Objective:     Vital Signs (Most Recent):  Temp: 98.1 °F (36.7 °C) (03/31/17 0755)  Pulse: 75 (03/31/17 0755)  Resp: 16 (03/31/17 0755)  BP: 112/70 (03/31/17 0755)  SpO2: 95 % (03/31/17 0755) Vital Signs (24h Range):  Temp:  [97.6 °F (36.4 °C)-99 °F (37.2 °C)] 98.1 °F (36.7 °C)  Pulse:  [70-89] 75  Resp:  [15-16] 16  SpO2:  [95 %-100 %] 95 %  BP: (111-139)/(70-81) 112/70     Weight: 88.9 kg (196 lb)  Body mass index is 33.64 kg/(m^2).    Physical Exam   Constitutional: She is oriented to person, place, and time. She appears well-developed and well-nourished.   Eyes: EOM are normal. Pupils are equal, round, and reactive to light.   Pulmonary/Chest: Effort normal.   Neurological: She is oriented to person, place, and time. She has an abnormal Finger-Nose-Finger Test (L>R. Also with Disdiadochokinesis (L>R) ) and an abnormal Heel to Gomez Test (L>R).   Psychiatric: Her speech is normal.       NEUROLOGICAL EXAMINATION:     MENTAL STATUS   Oriented to person, place, and time.   Speech: speech is normal   Level of consciousness: alert    CRANIAL NERVES     CN III, IV, VI   Pupils are equal, round, and reactive to light.  Extraocular motions are normal.   Nystagmus type: Difficulty with smooth pursuit bilaterally.    CN VII   Facial expression full, symmetric.     CN XI   CN XI normal.     MOTOR EXAM   Muscle bulk: normal  Overall muscle tone: normal    SENSORY EXAM   Light touch normal.        Temperature intact bilaterally     GAIT AND COORDINATION      Coordination   Finger to nose coordination: abnormal (L>R. Also with Disdiadochokinesis (L>R) )  Heel to shin  coordination: abnormal (L>R)    Tremor   Resting tremor: absent  Intention tremor: absent      Significant Labs:   CBC:   Recent Labs  Lab 03/29/17  1639 03/30/17  0426 03/31/17  0518   WBC 8.04 8.84 8.41   HGB 8.7* 8.2* 8.7*   HCT 29.0* 27.3* 27.6*   * 372* 409*     CMP:   Recent Labs  Lab 03/29/17  1639 03/30/17  0426   GLU 96 89    139   K 3.3* 3.5    108   CO2 23 24   BUN 8 6   CREATININE 0.7 0.6   CALCIUM 8.4* 8.3*   MG 1.9 1.9   PROT 7.1  --    ALBUMIN 3.2*  --    BILITOT 0.1  --    ALKPHOS 78  --    AST 15  --    ALT 17  --    ANIONGAP 8 7*   EGFRNONAA >60.0 >60.0       Long Chain Fatty Acids:     Ref. Range 3/3/2016 13:01 3/3/2016 13:02   C22:0 Latest Ref Range: <=96.3 nmol/mL 59.0      C24:0 Latest Ref Range: <=91.4 nmol/mL 37.9      C26:0 Latest Ref Range: <=1.30 nmol/mL 0.25      C24:0/C22:0 Latest Ref Range: <=1.39 ratio 0.64      C26:0/C22:0 Latest Ref Range: <=0.023 ratio 0.004      Pristanic Acid Latest Ref Range: <=2.98 nmol/mL 0.04      Phytanic Acid Latest Ref Range: <=9.88 nmol/mL 0.19      Pristanic/Phytanic Latest Ref Range: <=0.39 ratio 0.21      Long Chain Fatty Acids Unknown SEE BELOW      Acetylcarnitine Latest Ref Range: 5-29 umol/L    16       Arylsulfatase A,             108           nmol/h/mg  >=62   Leukocytes   Interpretation                                                 In this sample, arylsulfatase A activity is not reduced. This result indicates that this individual is not affected with either metachromatic leukodystrophy (MLD) or multiple sulfatase deficiency. If the clinical presentation is suggestive of MLD, consider urine sulfatide analysis (MML test CTSA) which is expected to be abnormal in patients with saposin B deficiency due to mutations in the prosaposin gene.      Alpha-Galactosidase, Leukocytes   Reason for Referral        Not provided   -------------------ADDITIONAL INFORMATION-------------------   Fluorometric Enzyme Assay   Alpha-Galactosidase,        66.9          nmol/h/mg  >=23.1   Leukocytes   In this specimen, the activity of alpha-galactosidase is normal, indicating this patient is most likely NOT a carrier for Fabry disease (OMIM [id number]). However, please note that enzyme analysis is, in general, not sufficiently sensitive to detect all carrier females (see Rashaad REESE et al. Hum Mutat 2009; 30: 1-9; and Patricia WR et al. Mol Janae Metab. 2008; 93: 112-128). Molecular genetic analysis of the GLA gene (MML test FABRZ/Fabry Disease, Full Gene Analysis) is the most reliable method of detecting carriers. If not already ordered, this could be done on the existing sample by calling MML within one month.      Ceramide Trihex and Sulfatide, U   In this sample, the pattern of elevated excretions of ceramide trihexoside species is suggestive of a biochemical diagnosis of Fabry disease (OMIM [id number]). Please contact the Biochemical Genetics consultant or genetic counselor on call ([phone]) if you have any questions.      Other Labs: abnormal bolded  Carnitine Free 35  Carnitine Plasma 51  Pyruvic acid 0.047  HIV pcr negative  Ceruloplasmin 31  Copper, serum  Aldolase 12.8 (H)  Ammonia 34  ACE 29  Anti-thyroglobulin ab <4.0  Thiamine 28 (L)  Vitamin B6 20  Vitamin B2 3  Vitamin B3 2.31  Folate 12.8  NMO negative         OUTSIDE CSF (2/12/16):  WCB 8  CBC 32  Gluc 71  Prot 19  CMV neg  HSV1 igg 0.06  HSV2 igG 0.63  Serum hiv neg (unknown what specific test)        24hr EEG 3/2-3/3 without seizures, slow background (prelim read)        Significant Imaging: I have reviewed all pertinent imaging results/findings within the past 24 hours.   Slight interval progression of age advanced generalized cerebral volume loss and abnormal T2/FLAIR hyperintensities throughout the supratentorial white matter with more prominent involvement of the splenium and left periatrial white matter on today's examination.  Although nonspecific, these findings would be in keeping with  "patient's clinical diagnosis of underlying leukodystrophy.  Short-term followup is suggested.          Assessment and Plan:     * Leukodystrophy  Ms. Perales is a 31 year old woman with a complicated recent history, with a new diagnosis of leukodystrophy (though the cause is still undetermined). 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 Her current presentation of "weakness" is more likely due to incoordination as seen on exam, and is consistent with the new lesions seen in her cerebellum. MRA was negative for any vascular abnormalities.    Recommendations:  -- 1000mg IV solumedrol daily x3 days - will likely be stable for discharge home after final dose  -- Redraw thiamine (previous labs from 3/18/16 showed deficiency, no test since in our system) - consider starting replacement as this is a sendout test and is unlikely to result prior to discharge  -- Physical and occupational therapy        VTE Risk Mitigation         Ordered     Medium Risk of VTE  Once      03/30/17 0021     Place sequential compression device  Until discontinued      03/30/17 0021          Kinjal Oneal MD  Neurology  [facility]  "

## 2017-03-31 NOTE — PLAN OF CARE
Cm called [facility] scheduling [phone] and No availability for family,?internal  medicine appts till July.  MD can enter amb referral to neurology.  md notified to enter

## 2017-04-01 LAB
BACTERIA #/AREA URNS AUTO: ABNORMAL /HPF
BASOPHILS # BLD AUTO: 0 K/UL
BASOPHILS NFR BLD: 0 %
BILIRUB UR QL STRIP: NEGATIVE
CLARITY UR REFRACT.AUTO: ABNORMAL
COLOR UR AUTO: YELLOW
DIFFERENTIAL METHOD: ABNORMAL
EOSINOPHIL # BLD AUTO: 0 K/UL
EOSINOPHIL NFR BLD: 0 %
ERYTHROCYTE [DISTWIDTH] IN BLOOD BY AUTOMATED COUNT: 18 %
GLUCOSE UR QL STRIP: NEGATIVE
HCT VFR BLD AUTO: 28.1 %
HGB BLD-MCNC: 8.5 G/DL
HGB UR QL STRIP: NEGATIVE
KETONES UR QL STRIP: NEGATIVE
LEUKOCYTE ESTERASE UR QL STRIP: ABNORMAL
LYMPHOCYTES # BLD AUTO: 1.9 K/UL
LYMPHOCYTES NFR BLD: 11.3 %
MCH RBC QN AUTO: 21.8 PG
MCHC RBC AUTO-ENTMCNC: 30.2 %
MCV RBC AUTO: 72 FL
MICROSCOPIC COMMENT: ABNORMAL
MONOCYTES # BLD AUTO: 0.8 K/UL
MONOCYTES NFR BLD: 4.5 %
NEUTROPHILS # BLD AUTO: 14.2 K/UL
NEUTROPHILS NFR BLD: 83.8 %
NITRITE UR QL STRIP: NEGATIVE
PH UR STRIP: 5 [PH] (ref 5–8)
PLATELET # BLD AUTO: 405 K/UL
PMV BLD AUTO: 8.8 FL
PROT UR QL STRIP: NEGATIVE
RBC # BLD AUTO: 3.9 M/UL
RBC #/AREA URNS AUTO: 2 /HPF (ref 0–4)
SP GR UR STRIP: 1.02 (ref 1–1.03)
SQUAMOUS #/AREA URNS AUTO: 6 /HPF
URN SPEC COLLECT METH UR: ABNORMAL
UROBILINOGEN UR STRIP-ACNC: NEGATIVE EU/DL
WBC # BLD AUTO: 16.94 K/UL
WBC #/AREA URNS AUTO: 7 /HPF (ref 0–5)

## 2017-04-01 PROCEDURE — 25000003 PHARM REV CODE 250: Performed by: HOSPITALIST

## 2017-04-01 PROCEDURE — 97166 OT EVAL MOD COMPLEX 45 MIN: CPT

## 2017-04-01 PROCEDURE — 11000001 HC ACUTE MED/SURG PRIVATE ROOM

## 2017-04-01 PROCEDURE — 36415 COLL VENOUS BLD VENIPUNCTURE: CPT

## 2017-04-01 PROCEDURE — 85025 COMPLETE CBC W/AUTO DIFF WBC: CPT

## 2017-04-01 PROCEDURE — 99233 SBSQ HOSP IP/OBS HIGH 50: CPT | Mod: ,,, | Performed by: PSYCHIATRY & NEUROLOGY

## 2017-04-01 PROCEDURE — 25000003 PHARM REV CODE 250: Performed by: PHYSICIAN ASSISTANT

## 2017-04-01 PROCEDURE — 81001 URINALYSIS AUTO W/SCOPE: CPT

## 2017-04-01 PROCEDURE — 99232 SBSQ HOSP IP/OBS MODERATE 35: CPT | Mod: ,,, | Performed by: HOSPITALIST

## 2017-04-01 PROCEDURE — 63600175 PHARM REV CODE 636 W HCPCS: Performed by: HOSPITALIST

## 2017-04-01 PROCEDURE — 97110 THERAPEUTIC EXERCISES: CPT

## 2017-04-01 RX ORDER — ENOXAPARIN SODIUM 100 MG/ML
40 INJECTION SUBCUTANEOUS EVERY 24 HOURS
Status: DISCONTINUED | OUTPATIENT
Start: 2017-04-01 | End: 2017-04-06 | Stop reason: HOSPADM

## 2017-04-01 RX ADMIN — BACLOFEN 10 MG: 10 TABLET ORAL at 01:04

## 2017-04-01 RX ADMIN — Medication 1 TABLET: at 08:04

## 2017-04-01 RX ADMIN — BACLOFEN 10 MG: 10 TABLET ORAL at 05:04

## 2017-04-01 RX ADMIN — PAROXETINE HYDROCHLORIDE 20 MG: 10 TABLET, FILM COATED ORAL at 08:04

## 2017-04-01 RX ADMIN — ENOXAPARIN SODIUM 40 MG: 100 INJECTION SUBCUTANEOUS at 05:04

## 2017-04-01 RX ADMIN — THERA TABS 1 TABLET: TAB at 08:04

## 2017-04-01 RX ADMIN — BACLOFEN 10 MG: 10 TABLET ORAL at 09:04

## 2017-04-01 RX ADMIN — THIAMINE HYDROCHLORIDE 100 MG: 100 INJECTION, SOLUTION INTRAMUSCULAR; INTRAVENOUS at 01:04

## 2017-04-01 RX ADMIN — TRAZODONE HYDROCHLORIDE 50 MG: 50 TABLET ORAL at 09:04

## 2017-04-01 RX ADMIN — DEXTROSE: 50 INJECTION, SOLUTION INTRAVENOUS at 06:04

## 2017-04-01 NOTE — PLAN OF CARE
Problem: Patient Care Overview  Goal: Plan of Care Review  Outcome: Ongoing (interventions implemented as appropriate)  Pt is progressing with plan of care. Frequent rounds made to assess pain and safety. Pt's pain controlled with pain medication ordered at this time. Bed locked and at lowest position. Side rails up x 2. Call light within reach. Will continue to monitor.

## 2017-04-01 NOTE — PROGRESS NOTES
"Progress Note  [facility] Medicine      Admit Date: 3/29/2017    SUBJECTIVE:     Follow-up For:  Leukodystrophy    HPI and Hospital Course: Per neuro consult, "Ms. Perales is a 31 year old woman with a complicated history, who is admitted for worsening weakness. She initially presented to [facility] in February 2016, complaining of visual changes (unable to describe, ?blurry) and generalized weakness since October 2015. MRI brain showed non-specific white matter changes concerning for leukodystrophy vs demyelination. [place] was [place] [facility] [place]. Her symptoms progressed to her being nonverbal and paraplegic, and she would reportedly alternate between catatonia and severe weakness. Workup included LP which was unremarkable, including negative for MS. Autoimmune, porphyria and infectious workup was also negative (including HIV). She did have a breast abscess that was aspirated, and subsequent bacteremia (staph) while at [facility] that was treated with IV antibiotics. She also developed a scaly rash, and biopsy showed "pityriasis rubra pilaris".      She also had an EEG and EMG which were negative. Diagnosis was unclear, but the differential was a leukodystrophy. She was treated with IV steroids with a PO taper, to which she responded well. She was discharged to inpatient rehab, where she regained strength and was eventually able to walk unassisted. She has been following with Dr. Du as an outpatient, who is continuing with investigations including genetic studies. She had contradicting results for Fabry disease, including normal alpha-galactoside activity but increased ceramide trihexoside species.      She says that when she returned from her Neurology appointment last week, she had a fall due to lower extremity weakness. She says since then she has been ambulating with a walker and had a few falls. She says that she feels her strength is there while lying, but she feels weak when " "she stands up. She also describes a sensation of her nerves shaking and says her vision has worsened. She is unable to characterize her vision changes, but says "you could say it's blurry" even with her glasses."    3/31: Started solumedrol last night, thus today is day 2 of 3-5 days, states that her speech is improved but otherwise similar to admit.  Starting empiric IV thiamine. Has anemia - denies BPR - states she has never had this diagnosis before, LMP beginning of March, uses about 5 pads daily for 5-7 days.     Interval History: Speech and strength further improved, but vision unchanged    Review of Systems:  Pain Scale: 0 /10   Constitutional: no fever or chills  Respiratory: no cough or shortness of breath  Cardiovascular: no chest pain or palpitations  Gastrointestinal: no nausea or vomiting, no abdominal pain or change in bowel habits  Genitourinary: no hematuria or dysuria  Integument/Breast: no rash or pruritis  Hematologic/Lymphatic: no easy bruising or lymphadenopathy  Musculoskeletal: no arthralgias or myalgias  Neurological: no seizures or tremors  Behavioral/Psych: no depression or anxiety    OBJECTIVE:     Vital Signs Range (Last 24H):  Temp:  [98.3 °F (36.8 °C)-98.9 °F (37.2 °C)]   Pulse:  [74-84]   Resp:  [14-20]   BP: (108-144)/(60-88)   SpO2:  [98 %-100 %]     I & O (Last 24H):    Intake/Output Summary (Last 24 hours) at 04/01/17 1219  Last data filed at 04/01/17 1000   Gross per 24 hour   Intake             1160 ml   Output              250 ml   Net              910 ml       Physical Exam:  General appearance: no distress  Mental status: Alert and oriented x 3  HEENT:  conjunctivae/corneas clear, PERRL  Neck: supple, thyroid not enlarged  Pulm:   normal respiratory effort, CTA B, no c/w/r  Card: RRR, S1, S2 normal, no murmur, click, rub or gallop  Abd: soft, NT, ND, BS present; no masses, no organomegaly  Ext: no c/c/e  Pulses: 2+, symmetric  Skin: color, texture, turgor normal. No rashes or " lesions  Neuro: CN II-XII grossly intact, with nystagmus and dysdiadochokinesis (L > R) and abnormal heel to shin (L>R)    Diagnostic Results:  Labs reviewed    Recent Results (from the past 24 hour(s))   Ferritin    Collection Time: 03/31/17  2:09 PM   Result Value Ref Range    Ferritin 15 (L) 20.0 - 300.0 ng/mL   Iron and TIBC    Collection Time: 03/31/17  2:09 PM   Result Value Ref Range    Iron 17 (L) 30 - 160 ug/dL    Transferrin 273 200 - 375 mg/dL    TIBC 404 250 - 450 ug/dL    Saturated Iron 4 (L) 20 - 50 %   Vitamin B12    Collection Time: 03/31/17  2:10 PM   Result Value Ref Range    Vitamin B-12 888 210 - 950 pg/mL   Folate    Collection Time: 03/31/17  2:10 PM   Result Value Ref Range    Folate 20.2 4.0 - 24.0 ng/mL   Reticulocytes    Collection Time: 03/31/17  2:10 PM   Result Value Ref Range    Retic 1.9 0.5 - 2.5 %   CBC with Automated Differential    Collection Time: 04/01/17  5:16 AM   Result Value Ref Range    WBC 16.94 (H) 3.90 - 12.70 K/uL    RBC 3.90 (L) 4.00 - 5.40 M/uL    Hemoglobin 8.5 (L) 12.0 - 16.0 g/dL    Hematocrit 28.1 (L) 37.0 - 48.5 %    MCV 72 (L) 82 - 98 fL    MCH 21.8 (L) 27.0 - 31.0 pg    MCHC 30.2 (L) 32.0 - 36.0 %    RDW 18.0 (H) 11.5 - 14.5 %    Platelets 405 (H) 150 - 350 K/uL    MPV 8.8 (L) 9.2 - 12.9 fL    Gran # 14.2 (H) 1.8 - 7.7 K/uL    Lymph # 1.9 1.0 - 4.8 K/uL    Mono # 0.8 0.3 - 1.0 K/uL    Eos # 0.0 0.0 - 0.5 K/uL    Baso # 0.00 0.00 - 0.20 K/uL    Gran% 83.8 (H) 38.0 - 73.0 %    Lymph% 11.3 (L) 18.0 - 48.0 %    Mono% 4.5 4.0 - 15.0 %    Eosinophil% 0.0 0.0 - 8.0 %    Basophil% 0.0 0.0 - 1.9 %    Differential Method Automated    Urinalysis    Collection Time: 04/01/17  6:26 AM   Result Value Ref Range    Specimen UA Urine, Clean Catch     Color, UA Yellow Yellow, Straw, Aminata    Appearance, UA Hazy (A) Clear    pH, UA 5.0 5.0 - 8.0    Specific Gravity, UA 1.025 1.005 - 1.030    Protein, UA Negative Negative    Glucose, UA Negative Negative    Ketones, UA Negative  "Negative    Bilirubin (UA) Negative Negative    Occult Blood UA Negative Negative    Nitrite, UA Negative Negative    Urobilinogen, UA Negative <2.0 EU/dL    Leukocytes, UA Trace (A) Negative   Urinalysis Microscopic    Collection Time: 04/01/17  6:26 AM   Result Value Ref Range    RBC, UA 2 0 - 4 /hpf    WBC, UA 7 (H) 0 - 5 /hpf    Bacteria, UA MODERATE None-Occ /hpf    Squam Epithel, UA 6 /hpf    Microscopic Comment SEE COMMENT            ASSESSMENT/PLAN:     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 Per Dr Mcgregor (neuro staff), "Pt with unknown glycogen/lysosomal storage deficiency, and known to Movement disorder division of neurology. Pt with difficulty ambulating 2 days ago. Likely more related to cerebellar changes seen on MRI than actual weakness. Cerebellar signs noted in extraocular eye movement, speech and left > right UE and LE movement. At this time, I have spoken with the movement disorders division doctors who previously saw her and we have agreed that as she has had trouble with her insurance at our facility, she should follow up with Dr. Soni at [facility], and she would likely benefit from 3-5 days of IV steroids. Neurology to follow and determine if she needs 3 days or 5 days of treatment. She will also likely need genetics consult as an outpatient."  - solumedrol 1g IV x 5 days per neuro  - home baclofen 10 TID  - MRI brain with and without contrast "Slight interval progression of age advanced generalized cerebral volume loss and abnormal T2/FLAIR hyperintensities throughout the supratentorial white matter with more prominent involvement of the splenium and left periatrial white matter on today's examination.  Although nonspecific, these findings would be in keeping with patient's clinical diagnosis of underlying leukodystrophy." MRA without contrast unremarkable  - empiric thiamine 100mg IV daily and transition to PO upon d/c. Thiamine level pending - will " likely not be resulted at time of d/c. Started B12, folate, MVI as well  - PT/OT  - [facility] and      Anemia, microcytic.  Fe studies consistent with iron deficiency anemia (low fe, low ferritin) with component of anemia of chronic illness (TIBC wnl, likely due to mixed picture).  Blood loss likely from menorrhagia. No overt bleeding at this time. She is weak (due to neuro disorder as above) so will monitor closely for need for transfusion.   - start oral iron x 3 months upon d/c home   - defer to outpatient setting for further testing such as Hb electrophoresis and BMBx    Mood disorder  - home paroxetine 20  - home trazodone 50 qhs    Rash B UE with desquamation and pigmentation changes, which she attributes to changing of the seasons.  Possible association with her undiagnosed neurologic disorder.   - [facility] Derm referral     albuterol-ipratropium 2.5mg-0.5mg/3mL nebulizer solution 3 mL, 3 mL, Nebulization, Q4H PRN    Prophylaxis-  enoxaparin    Advance directives - full code    Post-acute care- pending clinical condition, home with outpt rehab Rx and close f/u at [facility] neuro (Dr Soni), [facility] genetics (Dr Kaleb Schreiber), establish PCP in [facility] network.  Derm clinic referral.     Time spent in care of patient: 35 mins    Raine Benito MD  Hospital Medicine Staff

## 2017-04-01 NOTE — PLAN OF CARE
Problem: Patient Care Overview  Goal: Plan of Care Review  Outcome: Ongoing (interventions implemented as appropriate)  Pt transferred to unit at 22:15. A&O x 4. All vital signs stable. Pt ambulates with assistance and uses walker. Neuro checks stable. Pt ambulating to toilet, urinated x 2. No bowel movement. Pt able to void spontaneously. Urine sample sent to lab.     Pt c/o chronic pain 7/10 bilateral knees. Scheduled baclofen 10 mg given. Reassessed pain 6/10. Will continue to monitor pt.

## 2017-04-01 NOTE — ASSESSMENT & PLAN NOTE
"Ms. Perales is a 31 year old woman with a complicated recent history, with a new diagnosis of leukodystrophy (though the cause is still undetermined). 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 Her current presentation of "weakness" is more likely due to incoordination as seen on exam, and is consistent with the new lesions seen in her cerebellum. MRA was negative for any vascular abnormalities.    Recommendations:  -- 1000mg IV solumedrol daily x 5days - extend treatment with steroids out for a total of 5 days   -- Increase baclofen dosing to 20 mg tid, as patient having ongoing pain from spasms  -- Redraw thiamine (previous labs from 3/18/16 showed deficiency, no test since in our system) - consider starting replacement as this is a sendout test and is unlikely to result prior to discharge  -- Physical and occupational therapy    "

## 2017-04-01 NOTE — SUBJECTIVE & OBJECTIVE
Subjective:     Interval History: Patient reports that she did not have any problems overnight and is currently feeling slightly improved in her coordination. No other complaints this morning.    Current Facility-Administered Medications   Medication Dose Route Frequency Provider Last Rate Last Dose    acetaminophen tablet 650 mg  650 mg Oral Q4H PRN Donna Mcdonald PA-C        albuterol-ipratropium 2.5mg-0.5mg/3mL nebulizer solution 3 mL  3 mL Nebulization Q4H PRN Donna Mcdonald PA-C        baclofen tablet 10 mg  10 mg Oral TID Donna Mcdonald PA-C   10 mg at 04/01/17 0543    dextrose 50% injection 12.5 g  12.5 g Intravenous PRN Donna Mcdonald PA-C        dextrose 50% injection 25 g  25 g Intravenous PRN Donna Mdconald PA-C        folic acid-vit B6-vit B12 2.5-25-2 mg tablet 1 tablet  1 tablet Oral Daily Raine Benito MD   1 tablet at 04/01/17 0847    glucagon (human recombinant) injection 1 mg  1 mg Intramuscular PRN Donna Mcdonald PA-C        glucose chewable tablet 16 g  16 g Oral PRN Donna Mcdonald PA-C        glucose chewable tablet 24 g  24 g Oral PRN Donna Mcdonald PA-C        loperamide capsule 2 mg  2 mg Oral PRN Donna Mcdonald PA-C        methylPREDNISolone sodium succinate (SOLU-MEDROL) 1,000 mg in dextrose 5 % 100 mL IVPB   Intravenous Daily Raine Benito MD        multivitamin tablet 1 tablet  1 tablet Oral Daily Raine Benito MD   1 tablet at 04/01/17 0847    ondansetron disintegrating tablet 8 mg  8 mg Oral Q8H PRN Donna Mcdonald PA-C        paroxetine tablet 20 mg  20 mg Oral Daily Donna Mcdonald PA-C   20 mg at 04/01/17 0847    polyethylene glycol packet 17 g  17 g Oral TID PRN Donna Mcdonald PA-C        promethazine tablet 25 mg  25 mg Oral Q6H PRN Donna Mcdonald PA-C        ramelteon tablet 8 mg  8 mg Oral Nightly PRN MICHELLE Del ValleC        thiamine (B-1) 100 mg in dextrose 5 % 50 mL  IVPB  100 mg Intravenous Q24H Raine Benito MD 12.5 mL/hr at 03/31/17 1542 100 mg at 03/31/17 1542    trazodone tablet 50 mg  50 mg Oral QHS Donna Mcdonald PA-C   50 mg at 03/31/17 2115       Review of Systems   Constitutional: Negative for fever.   Gastrointestinal: Negative for abdominal pain.   Musculoskeletal: Negative for arthralgias and myalgias.   Psychiatric/Behavioral: Negative for agitation.     Objective:     Vital Signs (Most Recent):  Temp: 98.7 °F (37.1 °C) (04/01/17 0843)  Pulse: 76 (04/01/17 0843)  Resp: 18 (04/01/17 0843)  BP: (!) 143/88 (04/01/17 0843)  SpO2: 100 % (04/01/17 0843) Vital Signs (24h Range):  Temp:  [98.3 °F (36.8 °C)-98.9 °F (37.2 °C)] 98.7 °F (37.1 °C)  Pulse:  [74-84] 76  Resp:  [14-20] 18  SpO2:  [98 %-100 %] 100 %  BP: (108-144)/(60-88) 143/88     Weight: 94.4 kg (208 lb 1.6 oz)  Body mass index is 35.72 kg/(m^2).    Physical Exam   Constitutional: She is oriented to person, place, and time. She appears well-developed and well-nourished.   Eyes: EOM are normal. Pupils are equal, round, and reactive to light.   Pulmonary/Chest: Effort normal.   Neurological: She is oriented to person, place, and time.   Psychiatric: Her speech is normal.       NEUROLOGICAL EXAMINATION:     MENTAL STATUS   Oriented to person, place, and time.   Speech: speech is normal     CRANIAL NERVES     CN III, IV, VI   Pupils are equal, round, and reactive to light.  Extraocular motions are normal.       Significant Labs:   Recent Results (from the past 24 hour(s))   Ferritin    Collection Time: 03/31/17  2:09 PM   Result Value Ref Range    Ferritin 15 (L) 20.0 - 300.0 ng/mL   Iron and TIBC    Collection Time: 03/31/17  2:09 PM   Result Value Ref Range    Iron 17 (L) 30 - 160 ug/dL    Transferrin 273 200 - 375 mg/dL    TIBC 404 250 - 450 ug/dL    Saturated Iron 4 (L) 20 - 50 %   Vitamin B12    Collection Time: 03/31/17  2:10 PM   Result Value Ref Range    Vitamin B-12 888 210 - 950 pg/mL   Folate     Collection Time: 03/31/17  2:10 PM   Result Value Ref Range    Folate 20.2 4.0 - 24.0 ng/mL   Reticulocytes    Collection Time: 03/31/17  2:10 PM   Result Value Ref Range    Retic 1.9 0.5 - 2.5 %   CBC with Automated Differential    Collection Time: 04/01/17  5:16 AM   Result Value Ref Range    WBC 16.94 (H) 3.90 - 12.70 K/uL    RBC 3.90 (L) 4.00 - 5.40 M/uL    Hemoglobin 8.5 (L) 12.0 - 16.0 g/dL    Hematocrit 28.1 (L) 37.0 - 48.5 %    MCV 72 (L) 82 - 98 fL    MCH 21.8 (L) 27.0 - 31.0 pg    MCHC 30.2 (L) 32.0 - 36.0 %    RDW 18.0 (H) 11.5 - 14.5 %    Platelets 405 (H) 150 - 350 K/uL    MPV 8.8 (L) 9.2 - 12.9 fL    Gran # 14.2 (H) 1.8 - 7.7 K/uL    Lymph # 1.9 1.0 - 4.8 K/uL    Mono # 0.8 0.3 - 1.0 K/uL    Eos # 0.0 0.0 - 0.5 K/uL    Baso # 0.00 0.00 - 0.20 K/uL    Gran% 83.8 (H) 38.0 - 73.0 %    Lymph% 11.3 (L) 18.0 - 48.0 %    Mono% 4.5 4.0 - 15.0 %    Eosinophil% 0.0 0.0 - 8.0 %    Basophil% 0.0 0.0 - 1.9 %    Differential Method Automated    Urinalysis    Collection Time: 04/01/17  6:26 AM   Result Value Ref Range    Specimen UA Urine, Clean Catch     Color, UA Yellow Yellow, Straw, Aminata    Appearance, UA Hazy (A) Clear    pH, UA 5.0 5.0 - 8.0    Specific Gravity, UA 1.025 1.005 - 1.030    Protein, UA Negative Negative    Glucose, UA Negative Negative    Ketones, UA Negative Negative    Bilirubin (UA) Negative Negative    Occult Blood UA Negative Negative    Nitrite, UA Negative Negative    Urobilinogen, UA Negative <2.0 EU/dL    Leukocytes, UA Trace (A) Negative   Urinalysis Microscopic    Collection Time: 04/01/17  6:26 AM   Result Value Ref Range    RBC, UA 2 0 - 4 /hpf    WBC, UA 7 (H) 0 - 5 /hpf    Bacteria, UA MODERATE None-Occ /hpf    Squam Epithel, UA 6 /hpf    Microscopic Comment SEE COMMENT      Significant Imaging:   Imaging Results         MRA Brain without contrast (Final result) Result time:  03/30/17 21:41:57    Final result by Beto Lion MD (03/30/17 21:41:57)    Impression:      No evidence of  abnormal intracranial vasculature.  ______________________________________     Electronically signed by resident: BETO BRAUN MD  Date:     03/30/17  Time:    21:24            As the supervising and teaching physician, I personally reviewed the images and resident's interpretation and I agree with the findings.          Electronically signed by: PAULA GARCIA MD  Date:     03/30/17  Time:    21:41     Narrative:    MRA head    03/30/17 20:05:16    Accession# [id number]    CLINICAL INDICATION: 31 year old F with Cerebral leukodystrophy of unknown etiology, with worsened weakness,     TECHNIQUE: 3-D time-of-flight noncontrast MR angiogram of the intracranial vasculature.  Multiple MIP reconstructions were performed.    COMPARISON: MRI brain 3/29/2017.    FINDINGS:  The visualized ICAs are normal in caliber.  The vertebrobasilar system appears within normal limits. The ACAs, MCAs and PCAs demonstrate no evidence of  high-grade stenosis, focal occlusion or intracranial aneurysm.  There are bilateral posterior communicating arteries.            MRI Brain W WO Contrast (Final result) Result time:  03/29/17 21:05:12    Final result by Beto Braun MD (03/29/17 21:05:12)    Impression:      Slight interval progression of age advanced generalized cerebral volume loss and abnormal T2/FLAIR hyperintensities throughout the supratentorial white matter with more prominent involvement of the splenium and left periatrial white matter on today's examination.  Although nonspecific, these findings would be in keeping with patient's clinical diagnosis of underlying leukodystrophy.  Short-term followup is suggested.    No evidence of abnormal enhancement.    No acute infarct or hemorrhage.  No hydrocephalus.        ______________________________________     Electronically signed by resident: BETO BRAUN MD  Date:     03/29/17  Time:    20:23            As the supervising and teaching physician, I personally reviewed the images and resident's  interpretation and I agree with the findings.          Electronically signed by: PAULA GARCIA MD  Date:     03/29/17  Time:    21:05     Narrative:    MRI brain with contrast    03/29/17 19:37:36    Accession# [id number]    CLINICAL INDICATION: 31 year old F with history of progressive lower extremities weakness     TECHNIQUE: Multiplanar multisequence MR imaging of the brain was performed before and after the administration of 10 cc of Gadavistintravenous contrast.     COMPARISON:  MRI 3/8/2016.    FINDINGS:  The ventricles are stable in size without evidence of hydrocephalus.  There is redemonstration of age advanced generalized cerebral volume loss with increase sulcation slightly increased compared to the prior examination from 3/8/2016.  There is redemonstration of scattered abnormal T2/FLAIR hyperintensities within the supratentorial white matter bilaterally with more confluent area of involvement within the left periatrial region.  There is also involvement of the splenium of the corpus callosum with infratentorial extension to the dentate nuclei via the middle cerebellar peduncles.  These findings have overall slightly progressed.    No evidence of acute infarct or hemorrhage.  No abnormal extra-axial blood or fluid collections.  There is no abnormal parenchymal or leptomeningeal enhancement.    No midline shift or mass effect.  The major skull base T2 flow voids are present.  The visualized paranasal sinuses and mastoid air cells are clear.  Osseous marrow signal is within normal limits.  Structures within the sella and suprasellar region are within normal limits.  The basilar cisterns are patent.

## 2017-04-01 NOTE — PROGRESS NOTES
[facility]  Neurology  Progress Note    Patient Name: Richard Perales  MRN: [id number]  Admission Date: 3/29/2017  Hospital Length of Stay: 3 days  Code Status: Full Code   Attending Provider: Raine Benito MD  Primary Care Physician: Primary Doctor No   Principal Problem:Leukodystrophy      Subjective:     Interval History: Patient reports that she did not have any problems overnight and is currently feeling slightly improved in her coordination. No other complaints this morning.    Current Facility-Administered Medications   Medication Dose Route Frequency Provider Last Rate Last Dose    acetaminophen tablet 650 mg  650 mg Oral Q4H PRN Donna Mcdonald PA-C        albuterol-ipratropium 2.5mg-0.5mg/3mL nebulizer solution 3 mL  3 mL Nebulization Q4H PRN Donna Mcdonald PA-C        baclofen tablet 10 mg  10 mg Oral TID Donna Mcdonald PA-C   10 mg at 04/01/17 0543    dextrose 50% injection 12.5 g  12.5 g Intravenous PRN Donna Mcdonald PA-C        dextrose 50% injection 25 g  25 g Intravenous PRN Donna Mcdonald PA-C        folic acid-vit B6-vit B12 2.5-25-2 mg tablet 1 tablet  1 tablet Oral Daily Raine Benito MD   1 tablet at 04/01/17 0847    glucagon (human recombinant) injection 1 mg  1 mg Intramuscular PRN Donna Mcdonald PA-C        glucose chewable tablet 16 g  16 g Oral PRN Donna Mcdonald PA-C        glucose chewable tablet 24 g  24 g Oral PRN Donna Mcdonald PA-C        loperamide capsule 2 mg  2 mg Oral PRN Donna Mcdonald PA-C        methylPREDNISolone sodium succinate (SOLU-MEDROL) 1,000 mg in dextrose 5 % 100 mL IVPB   Intravenous Daily Raine Benito MD        multivitamin tablet 1 tablet  1 tablet Oral Daily Raine Benito MD   1 tablet at 04/01/17 0847    ondansetron disintegrating tablet 8 mg  8 mg Oral Q8H PRN Donna Mcdonald PA-C        paroxetine tablet 20 mg  20 mg Oral Daily Donna Mcdonald PA-C   20 mg  at 04/01/17 0847    polyethylene glycol packet 17 g  17 g Oral TID PRN Donna Mcdonald PA-C        promethazine tablet 25 mg  25 mg Oral Q6H PRN Donna Mcdonald PA-C        ramelteon tablet 8 mg  8 mg Oral Nightly PRN Donna Mcdonald PA-C        thiamine (B-1) 100 mg in dextrose 5 % 50 mL IVPB  100 mg Intravenous Q24H Raine Benito MD 12.5 mL/hr at 03/31/17 1542 100 mg at 03/31/17 1542    trazodone tablet 50 mg  50 mg Oral QHS Donna Mcdonald PA-C   50 mg at 03/31/17 2115       Review of Systems   Constitutional: Negative for fever.   Gastrointestinal: Negative for abdominal pain.   Musculoskeletal: Negative for arthralgias and myalgias.   Psychiatric/Behavioral: Negative for agitation.     Objective:     Vital Signs (Most Recent):  Temp: 98.7 °F (37.1 °C) (04/01/17 0843)  Pulse: 76 (04/01/17 0843)  Resp: 18 (04/01/17 0843)  BP: (!) 143/88 (04/01/17 0843)  SpO2: 100 % (04/01/17 0843) Vital Signs (24h Range):  Temp:  [98.3 °F (36.8 °C)-98.9 °F (37.2 °C)] 98.7 °F (37.1 °C)  Pulse:  [74-84] 76  Resp:  [14-20] 18  SpO2:  [98 %-100 %] 100 %  BP: (108-144)/(60-88) 143/88     Weight: 94.4 kg (208 lb 1.6 oz)  Body mass index is 35.72 kg/(m^2).    Physical Exam   Constitutional: She is oriented to person, place, and time. She appears well-developed and well-nourished.   Eyes: EOM are normal. Pupils are equal, round, and reactive to light.   Pulmonary/Chest: Effort normal.   Neurological: She is oriented to person, place, and time.   Psychiatric: Her speech is normal.       NEUROLOGICAL EXAMINATION:     MENTAL STATUS   Oriented to person, place, and time.   Speech: speech is normal     CRANIAL NERVES     CN III, IV, VI   Pupils are equal, round, and reactive to light.  Extraocular motions are normal.       Significant Labs:   Recent Results (from the past 24 hour(s))   Ferritin    Collection Time: 03/31/17  2:09 PM   Result Value Ref Range    Ferritin 15 (L) 20.0 - 300.0 ng/mL   Iron and TIBC     Collection Time: 03/31/17  2:09 PM   Result Value Ref Range    Iron 17 (L) 30 - 160 ug/dL    Transferrin 273 200 - 375 mg/dL    TIBC 404 250 - 450 ug/dL    Saturated Iron 4 (L) 20 - 50 %   Vitamin B12    Collection Time: 03/31/17  2:10 PM   Result Value Ref Range    Vitamin B-12 888 210 - 950 pg/mL   Folate    Collection Time: 03/31/17  2:10 PM   Result Value Ref Range    Folate 20.2 4.0 - 24.0 ng/mL   Reticulocytes    Collection Time: 03/31/17  2:10 PM   Result Value Ref Range    Retic 1.9 0.5 - 2.5 %   CBC with Automated Differential    Collection Time: 04/01/17  5:16 AM   Result Value Ref Range    WBC 16.94 (H) 3.90 - 12.70 K/uL    RBC 3.90 (L) 4.00 - 5.40 M/uL    Hemoglobin 8.5 (L) 12.0 - 16.0 g/dL    Hematocrit 28.1 (L) 37.0 - 48.5 %    MCV 72 (L) 82 - 98 fL    MCH 21.8 (L) 27.0 - 31.0 pg    MCHC 30.2 (L) 32.0 - 36.0 %    RDW 18.0 (H) 11.5 - 14.5 %    Platelets 405 (H) 150 - 350 K/uL    MPV 8.8 (L) 9.2 - 12.9 fL    Gran # 14.2 (H) 1.8 - 7.7 K/uL    Lymph # 1.9 1.0 - 4.8 K/uL    Mono # 0.8 0.3 - 1.0 K/uL    Eos # 0.0 0.0 - 0.5 K/uL    Baso # 0.00 0.00 - 0.20 K/uL    Gran% 83.8 (H) 38.0 - 73.0 %    Lymph% 11.3 (L) 18.0 - 48.0 %    Mono% 4.5 4.0 - 15.0 %    Eosinophil% 0.0 0.0 - 8.0 %    Basophil% 0.0 0.0 - 1.9 %    Differential Method Automated    Urinalysis    Collection Time: 04/01/17  6:26 AM   Result Value Ref Range    Specimen UA Urine, Clean Catch     Color, UA Yellow Yellow, Straw, Aminata    Appearance, UA Hazy (A) Clear    pH, UA 5.0 5.0 - 8.0    Specific Gravity, UA 1.025 1.005 - 1.030    Protein, UA Negative Negative    Glucose, UA Negative Negative    Ketones, UA Negative Negative    Bilirubin (UA) Negative Negative    Occult Blood UA Negative Negative    Nitrite, UA Negative Negative    Urobilinogen, UA Negative <2.0 EU/dL    Leukocytes, UA Trace (A) Negative   Urinalysis Microscopic    Collection Time: 04/01/17  6:26 AM   Result Value Ref Range    RBC, UA 2 0 - 4 /hpf    WBC, UA 7 (H) 0 - 5 /hpf     Bacteria, UA MODERATE None-Occ /hpf    Squam Epithel, UA 6 /hpf    Microscopic Comment SEE COMMENT      Significant Imaging:   Imaging Results         MRA Brain without contrast (Final result) Result time:  03/30/17 21:41:57    Final result by Beto Lion MD (03/30/17 21:41:57)    Impression:      No evidence of abnormal intracranial vasculature.  ______________________________________     Electronically signed by resident: BETO LION MD  Date:     03/30/17  Time:    21:24            As the supervising and teaching physician, I personally reviewed the images and resident's interpretation and I agree with the findings.          Electronically signed by: PAULA GARCIA MD  Date:     03/30/17  Time:    21:41     Narrative:    MRA head    03/30/17 20:05:16    Accession# [id number]    CLINICAL INDICATION: 31 year old F with Cerebral leukodystrophy of unknown etiology, with worsened weakness,     TECHNIQUE: 3-D time-of-flight noncontrast MR angiogram of the intracranial vasculature.  Multiple MIP reconstructions were performed.    COMPARISON: MRI brain 3/29/2017.    FINDINGS:  The visualized ICAs are normal in caliber.  The vertebrobasilar system appears within normal limits. The ACAs, MCAs and PCAs demonstrate no evidence of  high-grade stenosis, focal occlusion or intracranial aneurysm.  There are bilateral posterior communicating arteries.            MRI Brain W WO Contrast (Final result) Result time:  03/29/17 21:05:12    Final result by Beto Lion MD (03/29/17 21:05:12)    Impression:      Slight interval progression of age advanced generalized cerebral volume loss and abnormal T2/FLAIR hyperintensities throughout the supratentorial white matter with more prominent involvement of the splenium and left periatrial white matter on today's examination.  Although nonspecific, these findings would be in keeping with patient's clinical diagnosis of underlying leukodystrophy.  Short-term followup is suggested.    No evidence of  abnormal enhancement.    No acute infarct or hemorrhage.  No hydrocephalus.        ______________________________________     Electronically signed by resident: QUINN BRAUN MD  Date:     03/29/17  Time:    20:23            As the supervising and teaching physician, I personally reviewed the images and resident's interpretation and I agree with the findings.          Electronically signed by: PAULA GARCIA MD  Date:     03/29/17  Time:    21:05     Narrative:    MRI brain with contrast    03/29/17 19:37:36    Accession# [id number]    CLINICAL INDICATION: 31 year old F with history of progressive lower extremities weakness     TECHNIQUE: Multiplanar multisequence MR imaging of the brain was performed before and after the administration of 10 cc of Gadavistintravenous contrast.     COMPARISON:  MRI 3/8/2016.    FINDINGS:  The ventricles are stable in size without evidence of hydrocephalus.  There is redemonstration of age advanced generalized cerebral volume loss with increase sulcation slightly increased compared to the prior examination from 3/8/2016.  There is redemonstration of scattered abnormal T2/FLAIR hyperintensities within the supratentorial white matter bilaterally with more confluent area of involvement within the left periatrial region.  There is also involvement of the splenium of the corpus callosum with infratentorial extension to the dentate nuclei via the middle cerebellar peduncles.  These findings have overall slightly progressed.    No evidence of acute infarct or hemorrhage.  No abnormal extra-axial blood or fluid collections.  There is no abnormal parenchymal or leptomeningeal enhancement.    No midline shift or mass effect.  The major skull base T2 flow voids are present.  The visualized paranasal sinuses and mastoid air cells are clear.  Osseous marrow signal is within normal limits.  Structures within the sella and suprasellar region are within normal limits.  The basilar cisterns are patent.      "           Assessment and Plan:     * Leukodystrophy  Ms. Perales is a 31 year old woman with a complicated recent history, with a new diagnosis of leukodystrophy (though the cause is still undetermined). 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 Her current presentation of "weakness" is more likely due to incoordination as seen on exam, and is consistent with the new lesions seen in her cerebellum. MRA was negative for any vascular abnormalities.    Recommendations:  -- 1000mg IV solumedrol daily x 5days - extend treatment with steroids out for a total of 5 days   -- Increase baclofen dosing to 20 mg tid, as patient having ongoing pain from spasms  -- Redraw thiamine (previous labs from 3/18/16 showed deficiency, no test since in our system) - consider starting replacement as this is a sendout test and is unlikely to result prior to discharge  -- Physical and occupational therapy        VTE Risk Mitigation         Ordered     Medium Risk of VTE  Once      03/30/17 0021     Place sequential compression device  Until discontinued      03/30/17 0021          Micky Curry MD  Neurology  [facility]  "

## 2017-04-01 NOTE — PT/OT/SLP EVAL
"Occupational Therapy Evaluation/  Treatment/ Discharge Summary    Richard Perales   MRN: [id number]   Admitting Diagnosis: Leukodystrophy    OT Date of Treatment: 17   OT Start Time: 919  OT Stop Time: 941  OT Total Time (min): 22 min    Billable Minutes:  Evaluation 10 minutes  Therapeutic Exercise 8 minutes    Diagnosis: Leukodystrophy   Decreased coordination    Past Medical History:   Diagnosis Date    Leukodystrophy       History reviewed. No pertinent surgical history.    Referring physician: GOVIND Benito  Date referred to OT: 2017    General Precautions: Standard, fall    Do you have any cultural, spiritual, Evangelical conflicts, given your current situation?: none     Patient History:  Living Environment  Lives With: parent(s)  Living Arrangements: house  Home Accessibility: stairs to enter home  Number of Stairs to Enter Home: 4  Transportation Available: family or friend will provide  Living Environment Comment: Pt lives with mother in a 1SH with 4 KRISHNA and B rails. Pt was completely (I).  Equipment Currently Used at Home:  (Has RW, W/C, BSC, Shower chair)    Prior level of function:   Bed Mobility/Transfers: independent  Grooming: independent  Bathing: independent  Upper Body Dressing: independent  Lower Body Dressing: independent  Toileting: independent  Home Management Skills: independent  Homemaking Responsibilities: Yes     Dominant hand: right    Subjective:  Communicated with RN prior to session.  "They gave me a steroid shot and I'm doing a lot better now. They still don't know what caused this, but it's getting better."  Chief Complaint: balance  Patient/Family stated goals: get better and go home    Pain Ratin/10  Pain Rating Post-Intervention: 0/10    Objective:   Pt found supine in bed and agreeable to OT eval.    Cognitive Exam:  Oriented to: Person, Place, Time and Situation  Follows Commands/attention: Follows multistep  commands  Communication: clear/fluent  Memory:  No Deficits " noted  Safety awareness/insight to disability: intact  Coping skills/emotional control: Appropriate to situation    Visual/perceptual:  Intact    Physical Exam:  Postural examination/scapula alignment: No postural abnormalities identified  Skin integrity: Visible skin intact  Edema: None noted     Sensation:   Intact    Upper Extremity Range of Motion:  Right Upper Extremity: WFL  Left Upper Extremity: WFL    Upper Extremity Strength:  Right Upper Extremity: WFL  Left Upper Extremity: WFL   Strength: Good    Fine motor coordination:   Impaired opposition and manipulation of objects; pt requires increased time and multiple attempts for tasks.    Gross motor coordination: impaired    Functional Mobility:  Bed Mobility:  Supine to Sit: Modified Independent    Transfers:  Sit <> Stand Assistance: Modified Independent  Sit <> Stand Assistive Device: Rolling Walker  Bed <> Chair Technique: Stand Pivot  Bed <> Chair Transfer Assistance: Modified Independent  Bed <> Chair Assistive Device: No Assistive Device  Toilet Transfer Technique: Stand Pivot  Toilet Transfer Assistance: Modified Independent  Toilet Transfer Assistive Device: No Assistive Device    Functional Ambulation: Mod (I) with RW short distances (such as within the hospital room).    Activities of Daily Living:    LE Dressing Level of Assistance: Modified independent (underwear, pants, socks)  Toileting Where Assessed: Toilet  Toileting Level of Assistance: Modified independent    Balance:   Static Sit: NORMAL: No deviations seen in posture held statically  Dynamic Sit: GOOD+: Maintains balance through MAXIMAL excursions of active trunk motion  Static Stand: GOOD: Takes MODERATE challenges from all directions  Dynamic stand: GOOD: Needs SUPERVISION only during gait and able to self right with moderate  - Mod (I) short distances    Therapeutic Activities and Exercises:  Pt ed re OT role and POC. Pt performed supine to sit and scoot to EOB with Mod (I). Pt  "performed sit to stand t/f with Mod (I) and RW and ambulated to the bathroom. Pt performed toilet t/f with Mod (I). Pt returned to the room and ambulated to the couch. Pt donned underwear, pants, and socks with mod (I) and no LOB with sit to stand t/f. Pt performed UE therex with red theraband consisting of pull aparts 2x10 and biceps/triceps resistive push/pull 2x10.    AM-PAC 6 CLICK ADL  How much help from another person does this patient currently need?  1 = Unable, Total/Dependent Assistance  2 = A lot, Maximum/Moderate Assistance  3 = A little, Minimum/Contact Guard/Supervision  4 = None, Modified [place]/Independent    Putting on and taking off regular lower body clothing? : 4  Bathing (including washing, rinsing, drying)?: 3  Toileting, which includes using toilet, bedpan, or urinal? : 4  Putting on and taking off regular upper body clothing?: 4  Taking care of personal grooming such as brushing teeth?: 4  Eating meals?: 4  Total Score: 23    AM-PAC Raw Score CMS "G-Code Modifier Level of Impairment Assistance   6 % Total / Unable   7 - 9 CM 80 - 100% Maximal Assist   10 - 14 CL 60 - 80% Moderate Assist   15 - 19 CK 40 - 60% Moderate Assist   20 - 22 CJ 20 - 40% Minimal Assist   23 CI 1-20% SBA / CGA   24 CH 0% Independent/ Mod I       Patient left sitting on couch with call button in reach    Assessment:  Richard Perales is a 31 y.o. female with a medical diagnosis of Leukodystrophy and presents with the impairments listed below. Pt is pleasant and motivated to regain functional independence. Pt has no acute OT needs as she is able to perform all necessary ADLs safely and ambulate with a RW safely. Pt would benefit from OPPT and OPOT to improve gait and coordination. OT to d/c.    Rehab identified problem list/impairments: Rehab identified problem list/impairments: impaired functional mobilty, gait instability, decreased lower extremity function, decreased coordination    Rehab potential is " good.    Activity tolerance: Good    Discharge recommendations: Discharge Facility/Level Of Care Needs: outpatient PT, outpatient OT     Barriers to discharge: Barriers to Discharge: None    Equipment recommendations: none     GOALS:   Occupational Therapy Goals     Not on file      Multidisciplinary Problems (Resolved)        Problem: Occupational Therapy Goal    Goal Priority Disciplines Outcome Interventions   Occupational Therapy Goal   (Resolved)     OT, PT/OT Outcome(s) achieved              PLAN:  Pt is safe and Mod (I). OT to d/c.    LAURENCE Adams  4/1/2017  Pager: [phone]

## 2017-04-01 NOTE — PLAN OF CARE
Problem: Occupational Therapy Goal  Goal: Occupational Therapy Goal  Outcome: Outcome(s) achieved Date Met:  04/01/17  Pt is able to perform all necessary ADLs, has good family support, and ambulates safely with a RW. OT to d/c and recommending outpatient physical therapy and occupational therapy for higher level intervention regarding gait and coordination for functional tasks.     LAURENCE Adams  4/1/2017  Pager: [phone]

## 2017-04-02 LAB
BASOPHILS # BLD AUTO: 0 K/UL
BASOPHILS NFR BLD: 0 %
DIFFERENTIAL METHOD: ABNORMAL
EOSINOPHIL # BLD AUTO: 0 K/UL
EOSINOPHIL NFR BLD: 0 %
ERYTHROCYTE [DISTWIDTH] IN BLOOD BY AUTOMATED COUNT: 18 %
HCT VFR BLD AUTO: 27.5 %
HGB BLD-MCNC: 8.7 G/DL
LYMPHOCYTES # BLD AUTO: 1.4 K/UL
LYMPHOCYTES NFR BLD: 12 %
MCH RBC QN AUTO: 22.1 PG
MCHC RBC AUTO-ENTMCNC: 31.6 %
MCV RBC AUTO: 70 FL
MONOCYTES # BLD AUTO: 0.2 K/UL
MONOCYTES NFR BLD: 1.6 %
NEUTROPHILS # BLD AUTO: 10.3 K/UL
NEUTROPHILS NFR BLD: 86 %
PLATELET # BLD AUTO: 425 K/UL
PMV BLD AUTO: 8.6 FL
RBC # BLD AUTO: 3.94 M/UL
WBC # BLD AUTO: 11.96 K/UL

## 2017-04-02 PROCEDURE — 25000003 PHARM REV CODE 250: Performed by: HOSPITALIST

## 2017-04-02 PROCEDURE — 36415 COLL VENOUS BLD VENIPUNCTURE: CPT

## 2017-04-02 PROCEDURE — 63600175 PHARM REV CODE 636 W HCPCS: Performed by: HOSPITALIST

## 2017-04-02 PROCEDURE — 25000003 PHARM REV CODE 250: Performed by: PHYSICIAN ASSISTANT

## 2017-04-02 PROCEDURE — 85025 COMPLETE CBC W/AUTO DIFF WBC: CPT

## 2017-04-02 PROCEDURE — 99232 SBSQ HOSP IP/OBS MODERATE 35: CPT | Mod: ,,, | Performed by: HOSPITALIST

## 2017-04-02 PROCEDURE — 11000001 HC ACUTE MED/SURG PRIVATE ROOM

## 2017-04-02 RX ADMIN — ACETAMINOPHEN 650 MG: 325 TABLET ORAL at 12:04

## 2017-04-02 RX ADMIN — BACLOFEN 10 MG: 10 TABLET ORAL at 01:04

## 2017-04-02 RX ADMIN — BACLOFEN 10 MG: 10 TABLET ORAL at 08:04

## 2017-04-02 RX ADMIN — THERA TABS 1 TABLET: TAB at 08:04

## 2017-04-02 RX ADMIN — ENOXAPARIN SODIUM 40 MG: 100 INJECTION SUBCUTANEOUS at 05:04

## 2017-04-02 RX ADMIN — THIAMINE HYDROCHLORIDE 100 MG: 100 INJECTION, SOLUTION INTRAMUSCULAR; INTRAVENOUS at 01:04

## 2017-04-02 RX ADMIN — Medication 1 TABLET: at 08:04

## 2017-04-02 RX ADMIN — TRAZODONE HYDROCHLORIDE 50 MG: 50 TABLET ORAL at 08:04

## 2017-04-02 RX ADMIN — RAMELTEON 8 MG: 8 TABLET, FILM COATED ORAL at 12:04

## 2017-04-02 RX ADMIN — BACLOFEN 10 MG: 10 TABLET ORAL at 05:04

## 2017-04-02 RX ADMIN — PAROXETINE HYDROCHLORIDE 20 MG: 10 TABLET, FILM COATED ORAL at 08:04

## 2017-04-02 NOTE — PROGRESS NOTES
"Progress Note  [facility] Medicine      Admit Date: 3/29/2017    SUBJECTIVE:     Follow-up For:  Leukodystrophy    HPI and Hospital Course: Per neuro consult, "Ms. Perales is a 31 year old woman with a complicated history, who is admitted for worsening weakness. She initially presented to [facility] in February 2016, complaining of visual changes (unable to describe, ?blurry) and generalized weakness since October 2015. MRI brain showed non-specific white matter changes concerning for leukodystrophy vs demyelination. She was transferred to [facility] for further workup in March 2016. Her symptoms progressed to her being nonverbal and paraplegic, and she would reportedly alternate between catatonia and severe weakness. Workup included LP which was unremarkable, including negative for MS. Autoimmune, porphyria and infectious workup was also negative (including HIV). She did have a breast abscess that was aspirated, and subsequent bacteremia (staph) while at [facility] that was treated with IV antibiotics. She also developed a scaly rash, and biopsy showed "pityriasis rubra pilaris".      She also had an EEG and EMG which were negative. Diagnosis was unclear, but the differential was a leukodystrophy. She was treated with IV steroids with a PO taper, to which she responded well. She was discharged to inpatient rehab, where she regained strength and was eventually able to walk unassisted. She has been following with Dr. Du as an outpatient, who is continuing with investigations including genetic studies. She had contradicting results for Fabry disease, including normal alpha-galactoside activity but increased ceramide trihexoside species.      She says that when she returned from her Neurology appointment last week, she had a fall due to lower extremity weakness. She says since then she has been ambulating with a walker and had a few falls. She says that she feels her strength is there while lying, but she feels weak when " "she stands up. She also describes a sensation of her nerves shaking and says her vision has worsened. She is unable to characterize her vision changes, but says "you could say it's blurry" even with her glasses."    3/31: Started solumedrol last night, thus today is day 2 of 3-5 days, states that her speech is improved but otherwise similar to admit.  Starting empiric IV thiamine. Has anemia - denies BPR - states she has never had this diagnosis before, LMP beginning of March, uses about 5 pads daily for 5-7 days.     4/1: Speech and strength further improved, but vision unchanged    Interval History: Unchanged from prior    Review of Systems:  Pain Scale: 0 /10   Constitutional: no fever or chills  Respiratory: no cough or shortness of breath  Cardiovascular: no chest pain or palpitations  Gastrointestinal: no nausea or vomiting, no abdominal pain or change in bowel habits  Genitourinary: no hematuria or dysuria  Integument/Breast: no rash or pruritis  Hematologic/Lymphatic: no easy bruising or lymphadenopathy  Musculoskeletal: no arthralgias or myalgias  Neurological: no seizures or tremors  Behavioral/Psych: no depression or anxiety    OBJECTIVE:     Vital Signs Range (Last 24H):  Temp:  [97.7 °F (36.5 °C)-99.2 °F (37.3 °C)]   Pulse:  [76-89]   Resp:  [18]   BP: (101-150)/(59-90)   SpO2:  [97 %-99 %]     I & O (Last 24H):    Intake/Output Summary (Last 24 hours) at 04/02/17 1120  Last data filed at 04/02/17 1000   Gross per 24 hour   Intake             1145 ml   Output                0 ml   Net             1145 ml       Physical Exam:  General appearance: no distress  Mental status: Alert and oriented x 3  HEENT:  conjunctivae/corneas clear, PERRL  Neck: supple, thyroid not enlarged  Pulm:   normal respiratory effort, CTA B, no c/w/r  Card: RRR, S1, S2 normal, no murmur, click, rub or gallop  Abd: soft, NT, ND, BS present; no masses, no organomegaly  Ext: no c/c/e  Pulses: 2+, symmetric  Skin: color, texture, " "turgor normal. No rashes or lesions  Neuro: CN II-XII grossly intact, with nystagmus and dysdiadochokinesis (L > R) and abnormal heel to shin (L>R)    Diagnostic Results:  Labs reviewed    Recent Results (from the past 24 hour(s))   CBC with Automated Differential    Collection Time: 04/02/17  5:34 AM   Result Value Ref Range    WBC 11.96 3.90 - 12.70 K/uL    RBC 3.94 (L) 4.00 - 5.40 M/uL    Hemoglobin 8.7 (L) 12.0 - 16.0 g/dL    Hematocrit 27.5 (L) 37.0 - 48.5 %    MCV 70 (L) 82 - 98 fL    MCH 22.1 (L) 27.0 - 31.0 pg    MCHC 31.6 (L) 32.0 - 36.0 %    RDW 18.0 (H) 11.5 - 14.5 %    Platelets 425 (H) 150 - 350 K/uL    MPV 8.6 (L) 9.2 - 12.9 fL    Gran # 10.3 (H) 1.8 - 7.7 K/uL    Lymph # 1.4 1.0 - 4.8 K/uL    Mono # 0.2 (L) 0.3 - 1.0 K/uL    Eos # 0.0 0.0 - 0.5 K/uL    Baso # 0.00 0.00 - 0.20 K/uL    Gran% 86.0 (H) 38.0 - 73.0 %    Lymph% 12.0 (L) 18.0 - 48.0 %    Mono% 1.6 (L) 4.0 - 15.0 %    Eosinophil% 0.0 0.0 - 8.0 %    Basophil% 0.0 0.0 - 1.9 %    Differential Method Automated            ASSESSMENT/PLAN:     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 Per Dr Mcgregor (neuro staff), "Pt with unknown glycogen/lysosomal storage deficiency, and known to Movement disorder division of neurology. Pt with difficulty ambulating 2 days ago. Likely more related to cerebellar changes seen on MRI than actual weakness. Cerebellar signs noted in extraocular eye movement, speech and left > right UE and LE movement. At this time, I have spoken with the movement disorders division doctors who previously saw her and we have agreed that as she has had trouble with her insurance at our facility, she should follow up with Dr. Soni at [facility], and she would likely benefit from 3-5 days of IV steroids. Neurology to follow and determine if she needs 3 days or 5 days of treatment. She will also likely need genetics consult as an outpatient."  - solumedrol 1g IV x 5 days per neuro. Day 5/5 is " "tomorrow  - home baclofen 10 TID  - MRI brain with and without contrast "Slight interval progression of age advanced generalized cerebral volume loss and abnormal T2/FLAIR hyperintensities throughout the supratentorial white matter with more prominent involvement of the splenium and left periatrial white matter on today's examination.  Although nonspecific, these findings would be in keeping with patient's clinical diagnosis of underlying leukodystrophy." MRA without contrast unremarkable  - empiric thiamine 100mg IV daily and transition to PO upon d/c. Thiamine level pending - will likely not be resulted at time of d/c. Started B12, folate, MVI as well  - PT/OT  - [facility] and      Anemia, microcytic.  Fe studies consistent with iron deficiency anemia (low fe, low ferritin) with component of anemia of chronic illness (TIBC wnl, likely due to mixed picture).  Blood loss likely from menorrhagia. No overt bleeding at this time. She is weak (due to neuro disorder as above) so will monitor closely for need for transfusion.   - start oral iron x 3 months upon d/c home   - defer to outpatient setting for further testing such as Hb electrophoresis and BMBx    Mood disorder  - home paroxetine 20  - home trazodone 50 qhs    Rash B UE with desquamation and pigmentation changes, which she attributes to changing of the seasons.  Possible association with her undiagnosed neurologic disorder.   - U Derm referral     albuterol-ipratropium 2.5mg-0.5mg/3mL nebulizer solution 3 mL, 3 mL, Nebulization, Q4H PRN    Prophylaxis-  enoxaparin    Advance directives - full code    Post-acute care- pending clinical condition, home likely tomorrow with outpt rehab Rx and close f/u at [facility] neuro (Dr Soni), [facility] genetics (Dr Kaleb Schreiber), establish PCP in \[facility]\"" network.  Derm clinic referral.  All external referrals sent by me in [facility]    Time spent in care of patient: 35 mins    Raine " MD Thong  Hospital Medicine Staff

## 2017-04-02 NOTE — PLAN OF CARE
Problem: Fall Risk (Adult)  Goal: Absence of Falls  Patient will demonstrate the desired outcomes by discharge/transition of care.   Outcome: Ongoing (interventions implemented as appropriate)  No falls or trauma noted. Bed low and alarm on . See mobility note.     Problem: Patient Care Overview  Goal: Plan of Care Review  Outcome: Ongoing (interventions implemented as appropriate)  Makes needs known. Neuro checks within normal limits. Positions self in bed. Calls for assistance. Calls for meals and sits up on side of bed and eats. Denied pain. Assessment on going.     Problem: Infection, Risk/Actual (Adult)  Goal: Identify Related Risk Factors and Signs and Symptoms  Related risk factors and signs and symptoms are identified upon initiation of Human Response Clinical Practice Guideline (CPG)   Outcome: Ongoing (interventions implemented as appropriate)  Afebrile and vital signs stable. On  Iv prednisone and thiamine iv. Tolerated well.    Problem: Mobility, Physical Impaired (Adult)  Goal: Identify Related Risk Factors and Signs and Symptoms  Related risk factors and signs and symptoms are identified upon initiation of Human Response Clinical Practice Guideline (CPG)   Outcome: Ongoing (interventions implemented as appropriate)  Gait unsteady. Walker placed in room out of reach and remonded to call for help to and from bathroom. Able to teach back same. Call bell in reach and belongings in reach. Bed low and alarm  Was on .

## 2017-04-03 LAB
ANISOCYTOSIS BLD QL SMEAR: SLIGHT
BASOPHILS # BLD AUTO: ABNORMAL K/UL
BASOPHILS NFR BLD: 0 %
CRP SERPL-MCNC: 1.7 MG/L
DIFFERENTIAL METHOD: ABNORMAL
EOSINOPHIL # BLD AUTO: ABNORMAL K/UL
EOSINOPHIL NFR BLD: 0 %
ERYTHROCYTE [DISTWIDTH] IN BLOOD BY AUTOMATED COUNT: 18 %
ERYTHROCYTE [SEDIMENTATION RATE] IN BLOOD BY WESTERGREN METHOD: 20 MM/HR
HCT VFR BLD AUTO: 26.2 %
HGB BLD-MCNC: 8.2 G/DL
HYPOCHROMIA BLD QL SMEAR: ABNORMAL
LYMPHOCYTES # BLD AUTO: ABNORMAL K/UL
LYMPHOCYTES NFR BLD: 53 %
MCH RBC QN AUTO: 21.9 PG
MCHC RBC AUTO-ENTMCNC: 31.3 %
MCV RBC AUTO: 70 FL
MONOCYTES # BLD AUTO: ABNORMAL K/UL
MONOCYTES NFR BLD: 5 %
NEUTROPHILS NFR BLD: 42 %
OVALOCYTES BLD QL SMEAR: ABNORMAL
PLATELET # BLD AUTO: 410 K/UL
PLATELET BLD QL SMEAR: ABNORMAL
PMV BLD AUTO: 8.6 FL
POIKILOCYTOSIS BLD QL SMEAR: SLIGHT
POLYCHROMASIA BLD QL SMEAR: ABNORMAL
RBC # BLD AUTO: 3.75 M/UL
RHEUMATOID FACT SERPL-ACNC: <10 IU/ML
URATE SERPL-MCNC: 2.8 MG/DL
WBC # BLD AUTO: 13.15 K/UL

## 2017-04-03 PROCEDURE — 86431 RHEUMATOID FACTOR QUANT: CPT

## 2017-04-03 PROCEDURE — 63600175 PHARM REV CODE 636 W HCPCS: Performed by: HOSPITALIST

## 2017-04-03 PROCEDURE — 85027 COMPLETE CBC AUTOMATED: CPT

## 2017-04-03 PROCEDURE — 85007 BL SMEAR W/DIFF WBC COUNT: CPT

## 2017-04-03 PROCEDURE — 86140 C-REACTIVE PROTEIN: CPT

## 2017-04-03 PROCEDURE — 99233 SBSQ HOSP IP/OBS HIGH 50: CPT | Mod: ,,, | Performed by: PSYCHIATRY & NEUROLOGY

## 2017-04-03 PROCEDURE — 11000001 HC ACUTE MED/SURG PRIVATE ROOM

## 2017-04-03 PROCEDURE — 36415 COLL VENOUS BLD VENIPUNCTURE: CPT

## 2017-04-03 PROCEDURE — 99233 SBSQ HOSP IP/OBS HIGH 50: CPT | Mod: ,,, | Performed by: HOSPITALIST

## 2017-04-03 PROCEDURE — 86200 CCP ANTIBODY: CPT

## 2017-04-03 PROCEDURE — 25000003 PHARM REV CODE 250: Performed by: PHYSICIAN ASSISTANT

## 2017-04-03 PROCEDURE — 86225 DNA ANTIBODY NATIVE: CPT

## 2017-04-03 PROCEDURE — 86038 ANTINUCLEAR ANTIBODIES: CPT

## 2017-04-03 PROCEDURE — 85651 RBC SED RATE NONAUTOMATED: CPT

## 2017-04-03 PROCEDURE — 84550 ASSAY OF BLOOD/URIC ACID: CPT

## 2017-04-03 PROCEDURE — 25000003 PHARM REV CODE 250: Performed by: HOSPITALIST

## 2017-04-03 RX ADMIN — BACLOFEN 10 MG: 10 TABLET ORAL at 09:04

## 2017-04-03 RX ADMIN — THIAMINE HYDROCHLORIDE 100 MG: 100 INJECTION, SOLUTION INTRAMUSCULAR; INTRAVENOUS at 02:04

## 2017-04-03 RX ADMIN — TRAZODONE HYDROCHLORIDE 50 MG: 50 TABLET ORAL at 09:04

## 2017-04-03 RX ADMIN — BACLOFEN 10 MG: 10 TABLET ORAL at 01:04

## 2017-04-03 RX ADMIN — ENOXAPARIN SODIUM 40 MG: 100 INJECTION SUBCUTANEOUS at 04:04

## 2017-04-03 RX ADMIN — THERA TABS 1 TABLET: TAB at 08:04

## 2017-04-03 RX ADMIN — BACLOFEN 10 MG: 10 TABLET ORAL at 06:04

## 2017-04-03 RX ADMIN — Medication 1 TABLET: at 08:04

## 2017-04-03 RX ADMIN — PAROXETINE HYDROCHLORIDE 20 MG: 10 TABLET, FILM COATED ORAL at 08:04

## 2017-04-03 RX ADMIN — DEXTROSE MONOHYDRATE: 50 INJECTION, SOLUTION INTRAVENOUS at 01:04

## 2017-04-03 NOTE — SUBJECTIVE & OBJECTIVE
Subjective:     Interval History:  No events overnight. She reports that she has had worsening bilateral knee pain since yesterday, which she says makes ambulating difficult. Otherwise she denies any new issues.      Current Facility-Administered Medications   Medication Dose Route Frequency Provider Last Rate Last Dose    acetaminophen tablet 650 mg  650 mg Oral Q4H PRN Donna Mcdonald PA-C   650 mg at 04/02/17 0031    albuterol-ipratropium 2.5mg-0.5mg/3mL nebulizer solution 3 mL  3 mL Nebulization Q4H PRN Donna Mcdonald PA-C        baclofen tablet 10 mg  10 mg Oral TID Donna Mcdonald PA-C   10 mg at 04/03/17 0616    dextrose 50% injection 12.5 g  12.5 g Intravenous PRN Donna Mcdonald PA-C        dextrose 50% injection 25 g  25 g Intravenous PRN Donna Mcdonald PA-C        enoxaparin injection 40 mg  40 mg Subcutaneous Daily Raine Benito MD   40 mg at 04/02/17 1756    folic acid-vit B6-vit B12 2.5-25-2 mg tablet 1 tablet  1 tablet Oral Daily Raine Benito MD   1 tablet at 04/03/17 0805    glucagon (human recombinant) injection 1 mg  1 mg Intramuscular PRN Donna Mcdonald PA-C        glucose chewable tablet 16 g  16 g Oral PRN Donna Mcdonald PA-C        glucose chewable tablet 24 g  24 g Oral PRN Donna Mcdonald PA-C        loperamide capsule 2 mg  2 mg Oral PRN Donna Mcdonald PA-C        methylPREDNISolone sodium succinate (SOLU-MEDROL) 1,000 mg in dextrose 5 % 100 mL IVPB   Intravenous Daily Raine Benito MD        multivitamin tablet 1 tablet  1 tablet Oral Daily Raine Benito MD   1 tablet at 04/03/17 0805    ondansetron disintegrating tablet 8 mg  8 mg Oral Q8H PRN Donna Mcdonald PA-C        paroxetine tablet 20 mg  20 mg Oral Daily Donna Mcdonald PA-C   20 mg at 04/03/17 0805    polyethylene glycol packet 17 g  17 g Oral TID PRN NATASHA Del Valle-C        promethazine tablet 25 mg  25 mg Oral Q6H PRN Donna MALLOY  ELLEN Mcdonald        ramelteon tablet 8 mg  8 mg Oral Nightly PRN Donna Mcdonald PA-C   8 mg at 04/02/17 0031    thiamine (B-1) 100 mg in dextrose 5 % 50 mL IVPB  100 mg Intravenous Q24H Raine Benito MD 12.5 mL/hr at 04/02/17 1332 100 mg at 04/02/17 1332    trazodone tablet 50 mg  50 mg Oral QHS Donna Mcdonald PA-C   50 mg at 04/02/17 2035       Review of Systems   Constitutional: Negative for fever.   Gastrointestinal: Negative for abdominal pain.   Musculoskeletal: Positive for arthralgias (Bilateral knee pain). Negative for myalgias.   Psychiatric/Behavioral: Negative for agitation.     Objective:     Vital Signs (Most Recent):  Temp: 98.7 °F (37.1 °C) (04/03/17 1236)  Pulse: 87 (04/03/17 1236)  Resp: 18 (04/03/17 1236)  BP: 133/69 (04/03/17 1236)  SpO2: 98 % (04/03/17 1236) Vital Signs (24h Range):  Temp:  [98.4 °F (36.9 °C)-98.9 °F (37.2 °C)] 98.7 °F (37.1 °C)  Pulse:  [78-97] 87  Resp:  [16-18] 18  SpO2:  [97 %-100 %] 98 %  BP: (107-148)/(53-85) 133/69     Weight: 94.4 kg (208 lb 1.6 oz)  Body mass index is 35.72 kg/(m^2).    Physical Exam   Constitutional: She is oriented to person, place, and time. She appears well-developed and well-nourished.   Eyes: EOM are normal. Pupils are equal, round, and reactive to light.   Pulmonary/Chest: Effort normal.   Neurological: She is oriented to person, place, and time. She has an abnormal Finger-Nose-Finger Test (L>R. Also with Disdiadochokinesis (L>R) ) and an abnormal Heel to Gomez Test (L>R).   Psychiatric: Her speech is normal.       NEUROLOGICAL EXAMINATION:     MENTAL STATUS   Oriented to person, place, and time.   Speech: speech is normal   Level of consciousness: alert    CRANIAL NERVES     CN III, IV, VI   Pupils are equal, round, and reactive to light.  Extraocular motions are normal.   Nystagmus type: Difficulty with smooth pursuit bilaterally.    CN VII   Facial expression full, symmetric.     CN XI   CN XI normal.     MOTOR EXAM   Muscle  bulk: normal  Overall muscle tone: normal    SENSORY EXAM   Light touch normal.     GAIT AND COORDINATION      Coordination   Finger to nose coordination: abnormal (L>R. Also with Disdiadochokinesis (L>R) )  Heel to shin coordination: abnormal (L>R)    Tremor   Resting tremor: absent  Intention tremor: absent      Significant Labs:   CBC:     Recent Labs  Lab 04/02/17  0534 04/03/17  0458   WBC 11.96 13.15*   HGB 8.7* 8.2*   HCT 27.5* 26.2*   * 410*     CMP: No results for input(s): GLU, NA, K, CL, CO2, BUN, CREATININE, CALCIUM, MG, PROT, ALBUMIN, BILITOT, ALKPHOS, AST, ALT, ANIONGAP, EGFRNONAA in the last 48 hours.    Long Chain Fatty Acids:     Ref. Range 3/3/2016 13:01 3/3/2016 13:02   C22:0 Latest Ref Range: <=96.3 nmol/mL 59.0      C24:0 Latest Ref Range: <=91.4 nmol/mL 37.9      C26:0 Latest Ref Range: <=1.30 nmol/mL 0.25      C24:0/C22:0 Latest Ref Range: <=1.39 ratio 0.64      C26:0/C22:0 Latest Ref Range: <=0.023 ratio 0.004      Pristanic Acid Latest Ref Range: <=2.98 nmol/mL 0.04      Phytanic Acid Latest Ref Range: <=9.88 nmol/mL 0.19      Pristanic/Phytanic Latest Ref Range: <=0.39 ratio 0.21      Long Chain Fatty Acids Unknown SEE BELOW      Acetylcarnitine Latest Ref Range: 5-29 umol/L    16       Arylsulfatase A,             108           nmol/h/mg  >=62   Leukocytes   Interpretation                                                 In this sample, arylsulfatase A activity is not reduced. This result indicates that this individual is not affected with either metachromatic leukodystrophy (MLD) or multiple sulfatase deficiency. If the clinical presentation is suggestive of MLD, consider urine sulfatide analysis (MML test CTSA) which is expected to be abnormal in patients with saposin B deficiency due to mutations in the prosaposin gene.      Alpha-Galactosidase, Leukocytes   Reason for Referral        Not provided   -------------------ADDITIONAL INFORMATION-------------------   Fluorometric Enzyme  Assay   Alpha-Galactosidase,       66.9          nmol/h/mg  >=23.1   Leukocytes   In this specimen, the activity of alpha-galactosidase is normal, indicating this patient is most likely NOT a carrier for Fabry disease (OMIM [id number]). However, please note that enzyme analysis is, in general, not sufficiently sensitive to detect all carrier females (see Rashaad REESE et al. Hum Mutat 2009; 30: 1-9; and Patricia WR et al. Mol Janae Metab. 2008; 93: 112-128). Molecular genetic analysis of the GLA gene (MML test FABRZ/Fabry Disease, Full Gene Analysis) is the most reliable method of detecting carriers. If not already ordered, this could be done on the existing sample by calling MML within one month.      Ceramide Trihex and Sulfatide, U   In this sample, the pattern of elevated excretions of ceramide trihexoside species is suggestive of a biochemical diagnosis of Fabry disease (OMIM [id number]). Please contact the Biochemical Genetics consultant or genetic counselor on call ([phone]) if you have any questions.      Other Labs: abnormal bolded  Carnitine Free 35  Carnitine Plasma 51  Pyruvic acid 0.047  HIV pcr negative  Ceruloplasmin 31  Copper, serum  Aldolase 12.8 (H)  Ammonia 34  ACE 29  Anti-thyroglobulin ab <4.0  Thiamine 28 (L)  Vitamin B6 20  Vitamin B2 3  Vitamin B3 2.31  Folate 12.8  NMO negative         OUTSIDE CSF (2/12/16):  WCB 8  CBC 32  Gluc 71  Prot 19  CMV neg  HSV1 igg 0.06  HSV2 igG 0.63  Serum hiv neg (unknown what specific test)        24hr EEG 3/2-3/3 without seizures, slow background (prelim read)        Significant Imaging: I have reviewed all pertinent imaging results/findings within the past 24 hours.   Slight interval progression of age advanced generalized cerebral volume loss and abnormal T2/FLAIR hyperintensities throughout the supratentorial white matter with more prominent involvement of the splenium and left periatrial white matter on today's examination.  Although nonspecific, these findings  would be in keeping with patient's clinical diagnosis of underlying leukodystrophy.  Short-term followup is suggested.

## 2017-04-03 NOTE — ASSESSMENT & PLAN NOTE
"Ms. Perales is a 31 year old woman with a complicated recent history, with a new diagnosis of leukodystrophy (though the cause is still undetermined). 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 Her current presentation of "weakness" is more likely due to incoordination as seen on exam, and is consistent with the new lesions seen in her cerebellum. MRA was negative for any vascular abnormalities.    Recommendations:  -- 1000mg IV solumedrol daily x 5days - extend treatment with steroids for a total of 5 days   -- Increase baclofen dosing to 20 mg tid, as patient having ongoing pain from spasms  -- Thiamine pending  -- Physical and occupational therapy    "

## 2017-04-03 NOTE — PROGRESS NOTES
[facility]  Neurology  Progress Note    Patient Name: Richard Perales  MRN: [id number]  Admission Date: 3/29/2017  Hospital Length of Stay: 5 days  Code Status: Full Code   Attending Provider: Raine Benito MD  Primary Care Physician: Primary Doctor No   Principal Problem:Leukodystrophy      Subjective:     Interval History:  No events overnight. She reports that she has had worsening bilateral knee pain since yesterday, which she says makes ambulating difficult. Otherwise she denies any new issues.      Current Facility-Administered Medications   Medication Dose Route Frequency Provider Last Rate Last Dose    acetaminophen tablet 650 mg  650 mg Oral Q4H PRN Donna Mcdonald PA-C   650 mg at 04/02/17 0031    albuterol-ipratropium 2.5mg-0.5mg/3mL nebulizer solution 3 mL  3 mL Nebulization Q4H PRN Donna Mcdonald PA-C        baclofen tablet 10 mg  10 mg Oral TID Donna Mcdonald PA-C   10 mg at 04/03/17 0616    dextrose 50% injection 12.5 g  12.5 g Intravenous PRN Donna Mcdonald PA-C        dextrose 50% injection 25 g  25 g Intravenous PRN Donna Mcdonald PA-C        enoxaparin injection 40 mg  40 mg Subcutaneous Daily Raine Benito MD   40 mg at 04/02/17 1756    folic acid-vit B6-vit B12 2.5-25-2 mg tablet 1 tablet  1 tablet Oral Daily Raine Benito MD   1 tablet at 04/03/17 0805    glucagon (human recombinant) injection 1 mg  1 mg Intramuscular PRN Donna Mcdonald PA-C        glucose chewable tablet 16 g  16 g Oral PRN Donna Mcdonald PA-C        glucose chewable tablet 24 g  24 g Oral PRN Donna Mcdonald PA-C        loperamide capsule 2 mg  2 mg Oral PRN Donna Mcdonald PA-C        methylPREDNISolone sodium succinate (SOLU-MEDROL) 1,000 mg in dextrose 5 % 100 mL IVPB   Intravenous Daily Raine Benito MD        multivitamin tablet 1 tablet  1 tablet Oral Daily Raine Benito MD   1 tablet at 04/03/17 0805    ondansetron  disintegrating tablet 8 mg  8 mg Oral Q8H PRN Donna Mcdonald PA-C        paroxetine tablet 20 mg  20 mg Oral Daily Donna Mcdonald PA-C   20 mg at 04/03/17 0805    polyethylene glycol packet 17 g  17 g Oral TID PRN Donna Mcdonald PA-C        promethazine tablet 25 mg  25 mg Oral Q6H PRN Donna Mcdonald PA-C        ramelteon tablet 8 mg  8 mg Oral Nightly PRN Donna Mcdonald PA-C   8 mg at 04/02/17 0031    thiamine (B-1) 100 mg in dextrose 5 % 50 mL IVPB  100 mg Intravenous Q24H Raine Benito MD 12.5 mL/hr at 04/02/17 1332 100 mg at 04/02/17 1332    trazodone tablet 50 mg  50 mg Oral QHS Donna Mcdonald PA-C   50 mg at 04/02/17 2035       Review of Systems   Constitutional: Negative for fever.   Gastrointestinal: Negative for abdominal pain.   Musculoskeletal: Positive for arthralgias (Bilateral knee pain). Negative for myalgias.   Psychiatric/Behavioral: Negative for agitation.     Objective:     Vital Signs (Most Recent):  Temp: 98.7 °F (37.1 °C) (04/03/17 1236)  Pulse: 87 (04/03/17 1236)  Resp: 18 (04/03/17 1236)  BP: 133/69 (04/03/17 1236)  SpO2: 98 % (04/03/17 1236) Vital Signs (24h Range):  Temp:  [98.4 °F (36.9 °C)-98.9 °F (37.2 °C)] 98.7 °F (37.1 °C)  Pulse:  [78-97] 87  Resp:  [16-18] 18  SpO2:  [97 %-100 %] 98 %  BP: (107-148)/(53-85) 133/69     Weight: 94.4 kg (208 lb 1.6 oz)  Body mass index is 35.72 kg/(m^2).    Physical Exam   Constitutional: She is oriented to person, place, and time. She appears well-developed and well-nourished.   Eyes: EOM are normal. Pupils are equal, round, and reactive to light.   Pulmonary/Chest: Effort normal.   Neurological: She is oriented to person, place, and time. She has an abnormal Finger-Nose-Finger Test (L>R. Also with Disdiadochokinesis (L>R) ) and an abnormal Heel to Gomez Test (L>R).   Psychiatric: Her speech is normal.       NEUROLOGICAL EXAMINATION:     MENTAL STATUS   Oriented to person, place, and time.   Speech: speech is  normal   Level of consciousness: alert    CRANIAL NERVES     CN III, IV, VI   Pupils are equal, round, and reactive to light.  Extraocular motions are normal.   Nystagmus type: Difficulty with smooth pursuit bilaterally.    CN VII   Facial expression full, symmetric.     CN XI   CN XI normal.     MOTOR EXAM   Muscle bulk: normal  Overall muscle tone: normal    SENSORY EXAM   Light touch normal.     GAIT AND COORDINATION      Coordination   Finger to nose coordination: abnormal (L>R. Also with Disdiadochokinesis (L>R) )  Heel to shin coordination: abnormal (L>R)    Tremor   Resting tremor: absent  Intention tremor: absent      Significant Labs:   CBC:     Recent Labs  Lab 04/02/17  0534 04/03/17  0458   WBC 11.96 13.15*   HGB 8.7* 8.2*   HCT 27.5* 26.2*   * 410*     CMP: No results for input(s): GLU, NA, K, CL, CO2, BUN, CREATININE, CALCIUM, MG, PROT, ALBUMIN, BILITOT, ALKPHOS, AST, ALT, ANIONGAP, EGFRNONAA in the last 48 hours.    Long Chain Fatty Acids:     Ref. Range 3/3/2016 13:01 3/3/2016 13:02   C22:0 Latest Ref Range: <=96.3 nmol/mL 59.0      C24:0 Latest Ref Range: <=91.4 nmol/mL 37.9      C26:0 Latest Ref Range: <=1.30 nmol/mL 0.25      C24:0/C22:0 Latest Ref Range: <=1.39 ratio 0.64      C26:0/C22:0 Latest Ref Range: <=0.023 ratio 0.004      Pristanic Acid Latest Ref Range: <=2.98 nmol/mL 0.04      Phytanic Acid Latest Ref Range: <=9.88 nmol/mL 0.19      Pristanic/Phytanic Latest Ref Range: <=0.39 ratio 0.21      Long Chain Fatty Acids Unknown SEE BELOW      Acetylcarnitine Latest Ref Range: 5-29 umol/L    16       Arylsulfatase A,             108           nmol/h/mg  >=62   Leukocytes   Interpretation                                                 In this sample, arylsulfatase A activity is not reduced. This result indicates that this individual is not affected with either metachromatic leukodystrophy (MLD) or multiple sulfatase deficiency. If the clinical presentation is suggestive of MLD,  consider urine sulfatide analysis (MML test CTSA) which is expected to be abnormal in patients with saposin B deficiency due to mutations in the prosaposin gene.      Alpha-Galactosidase, Leukocytes   Reason for Referral        Not provided   -------------------ADDITIONAL INFORMATION-------------------   Fluorometric Enzyme Assay   Alpha-Galactosidase,       66.9          nmol/h/mg  >=23.1   Leukocytes   In this specimen, the activity of alpha-galactosidase is normal, indicating this patient is most likely NOT a carrier for Fabry disease (OMIM [id number]). However, please note that enzyme analysis is, in general, not sufficiently sensitive to detect all carrier females (see Hwu WL et al. Hum Mutat 2009; 30: 1-9; and Patricia MENDEZ et al. Mol Janae Metab. 2008; 93: 112-128). Molecular genetic analysis of the GLA gene (MML test FABRZ/Fabry Disease, Full Gene Analysis) is the most reliable method of detecting carriers. If not already ordered, this could be done on the existing sample by calling MML within one month.      Ceramide Trihex and Sulfatide, U   In this sample, the pattern of elevated excretions of ceramide trihexoside species is suggestive of a biochemical diagnosis of Fabry disease (OMIM [id number]). Please contact the Biochemical Genetics consultant or genetic counselor on call ([phone]) if you have any questions.      Other Labs: abnormal bolded  Carnitine Free 35  Carnitine Plasma 51  Pyruvic acid 0.047  HIV pcr negative  Ceruloplasmin 31  Copper, serum  Aldolase 12.8 (H)  Ammonia 34  ACE 29  Anti-thyroglobulin ab <4.0  Thiamine 28 (L)  Vitamin B6 20  Vitamin B2 3  Vitamin B3 2.31  Folate 12.8  NMO negative         OUTSIDE CSF (2/12/16):  WCB 8  CBC 32  Gluc 71  Prot 19  CMV neg  HSV1 igg 0.06  HSV2 igG 0.63  Serum hiv neg (unknown what specific test)        24hr EEG 3/2-3/3 without seizures, slow background (prelim read)        Significant Imaging: I have reviewed all pertinent imaging results/findings within  "the past 24 hours.   Slight interval progression of age advanced generalized cerebral volume loss and abnormal T2/FLAIR hyperintensities throughout the supratentorial white matter with more prominent involvement of the splenium and left periatrial white matter on today's examination.  Although nonspecific, these findings would be in keeping with patient's clinical diagnosis of underlying leukodystrophy.  Short-term followup is suggested.          Assessment and Plan:     * Leukodystrophy  Ms. Perales is a 31 year old woman with a complicated recent history, with a new diagnosis of leukodystrophy (though the cause is still undetermined). 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 Her current presentation of "weakness" is more likely due to incoordination as seen on exam, and is consistent with the new lesions seen in her cerebellum. MRA was negative for any vascular abnormalities.    Recommendations:  -- 1000mg IV solumedrol daily x 5days - extend treatment with steroids for a total of 5 days   -- Increase baclofen dosing to 20 mg tid, as patient having ongoing pain from spasms  -- Thiamine pending  -- Physical and occupational therapy        VTE Risk Mitigation         Ordered     enoxaparin injection 40 mg  Daily     Route:  Subcutaneous        04/01/17 1042     Medium Risk of VTE  Once      03/30/17 0021     Place sequential compression device  Until discontinued      03/30/17 0021          Kinjal Oneal MD  Neurology  [facility]  "

## 2017-04-03 NOTE — PLAN OF CARE
Cm called [phone]. [facility] didn't receive any ambulatory referrals via computer so CM manually faxed to them: Neurology, Genetics, and Dermatology referrals.   I included progress notes  Faxed to [phone]    Covering Cm for 4/4 will have to follow up on these appts listed above.    1531: Download   Your fax has been successfully sent to [phone] at [phone].

## 2017-04-03 NOTE — PROGRESS NOTES
"Progress Note  [facility] Medicine      Admit Date: 3/29/2017    SUBJECTIVE:     Follow-up For:  Leukodystrophy    HPI and Hospital Course: Per neuro consult, "Ms. Perales is a 31 year old woman with a complicated history, who is admitted for worsening weakness. She initially presented to [place] in February 2016, complaining of visual changes (unable to describe, ?blurry) and generalized weakness since October 2015. MRI brain showed non-specific white matter changes concerning for leukodystrophy vs demyelination. She was transferred to [facility] for further workup in March 2016. Her symptoms progressed to her being nonverbal and paraplegic, and she would reportedly alternate between catatonia and severe weakness. Workup included LP which was unremarkable, including negative for MS. Autoimmune, porphyria and infectious workup was also negative (including HIV). She did have a breast abscess that was aspirated, and subsequent bacteremia (staph) while at [place] that was treated with IV antibiotics. She also developed a scaly rash, and biopsy showed "pityriasis rubra pilaris".      She also had an EEG and EMG which were negative. Diagnosis was unclear, but the differential was a leukodystrophy. She was treated with IV steroids with a PO taper, to which she responded well. She was discharged to inpatient rehab, where she regained strength and was eventually able to walk unassisted. She has been following with Dr. Du as an outpatient, who is continuing with investigations including genetic studies. She had contradicting results for Fabry disease, including normal alpha-galactoside activity but increased ceramide trihexoside species.      She says that when she returned from her Neurology appointment last week, she had a fall due to lower extremity weakness. She says since then she has been ambulating with a walker and had a few falls. She says that she feels her strength is there while lying, but she feels weak when " "she stands up. She also describes a sensation of her nerves shaking and says her vision has worsened. She is unable to characterize her vision changes, but says "you could say it's blurry" even with her glasses."    3/31: Started solumedrol last night, thus today is day 2 of 3-5 days, states that her speech is improved but otherwise similar to admit.  Starting empiric IV thiamine. Has anemia - denies BPR - states she has never had this diagnosis before, LMP beginning of March, uses about 5 pads daily for 5-7 days.     4/1: Speech and strength further improved, but vision unchanged    4/2: Unchanged from prior    Interval History: Did not receive 4th dose of solumedrol yesterday due to error (see A/P) and she says "I felt it" with severe 10/10 knee pain, bilateral, acute on chronic. Says that a doctor told her she may have RA. Denies stiffness, denies change depending on time of day    Review of Systems:  Pain Scale: 0 /10   Constitutional: no fever or chills  Respiratory: no cough or shortness of breath  Cardiovascular: no chest pain or palpitations  Gastrointestinal: no nausea or vomiting, no abdominal pain or change in bowel habits  Genitourinary: no hematuria or dysuria  Integument/Breast: no rash or pruritis  Hematologic/Lymphatic: no easy bruising or lymphadenopathy  Musculoskeletal: + B knee pain, no myalgias  Neurological: no seizures or tremors  Behavioral/Psych: no depression or anxiety    OBJECTIVE:     Vital Signs Range (Last 24H):  Temp:  [98.4 °F (36.9 °C)-98.9 °F (37.2 °C)]   Pulse:  [78-97]   Resp:  [16-18]   BP: (107-148)/(53-86)   SpO2:  [97 %-100 %]     I & O (Last 24H):    Intake/Output Summary (Last 24 hours) at 04/03/17 1216  Last data filed at 04/03/17 0600   Gross per 24 hour   Intake              500 ml   Output              750 ml   Net             -250 ml       Physical Exam:  General appearance: no distress  Mental status: Alert and oriented x 3  HEENT:  conjunctivae/corneas clear, " "PERRL  Neck: supple, thyroid not enlarged  Pulm:   normal respiratory effort, CTA B, no c/w/r  Card: RRR, S1, S2 normal, no murmur, click, rub or gallop  Abd: soft, NT, ND, BS present; no masses, no organomegaly  Ext: no c/c/e. B knees - mild TTP but no edema/warmth/erythema  Pulses: 2+, symmetric  Skin: color, texture, turgor normal. B hands and distal forearms with xerosis and hypopigmentation  Neuro: CN II-XII grossly intact, with nystagmus and dysdiadochokinesis (L > R) and abnormal heel to shin (L>R)    Diagnostic Results:  Labs reviewed    Recent Results (from the past 24 hour(s))   CBC with Automated Differential    Collection Time: 04/03/17  4:58 AM   Result Value Ref Range    WBC 13.15 (H) 3.90 - 12.70 K/uL    RBC 3.75 (L) 4.00 - 5.40 M/uL    Hemoglobin 8.2 (L) 12.0 - 16.0 g/dL    Hematocrit 26.2 (L) 37.0 - 48.5 %    MCV 70 (L) 82 - 98 fL    MCH 21.9 (L) 27.0 - 31.0 pg    MCHC 31.3 (L) 32.0 - 36.0 %    RDW 18.0 (H) 11.5 - 14.5 %    Platelets 410 (H) 150 - 350 K/uL    MPV 8.6 (L) 9.2 - 12.9 fL    Lymph # CANCELED 1.0 - 4.8 K/uL    Mono # CANCELED 0.3 - 1.0 K/uL    Eos # CANCELED 0.0 - 0.5 K/uL    Baso # CANCELED 0.00 - 0.20 K/uL    Gran% 42.0 38.0 - 73.0 %    Lymph% 53.0 (H) 18.0 - 48.0 %    Mono% 5.0 4.0 - 15.0 %    Eosinophil% 0.0 0.0 - 8.0 %    Basophil% 0.0 0.0 - 1.9 %    Platelet Estimate Increased (A)     Aniso Slight     Poik Slight     Poly Occasional     Hypo Occasional     Ovalocytes Occasional     Differential Method Manual            ASSESSMENT/PLAN:     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 Per Dr Mcgregor (neuro staff), "Pt with unknown glycogen/lysosomal storage deficiency, and known to Movement disorder division of neurology. Pt with difficulty ambulating 2 days ago. Likely more related to cerebellar changes seen on MRI than actual weakness. Cerebellar signs noted in extraocular eye movement, speech and left > right UE and LE movement. At this " "time, I have spoken with the movement disorders division doctors who previously saw her and we have agreed that as she has had trouble with her insurance at our facility, she should follow up with Dr. Soni at [facility], and she would likely benefit from 3-5 days of IV steroids. Neurology to follow and determine if she needs 3 days or 5 days of treatment. She will also likely need genetics consult as an outpatient."  - solumedrol 1g IV x 3 days given, and I ordered 3 days, then when the plan was extended to 5 days, I did not order the 4th and 5th doses as I thought I had ordered it q24 without a stop date.  I disclosed this error to patient today.  Ordered 4th dose for today, 5th dose will be tomorrow. F/u neuro recs for end date of steroids  - home baclofen 10 TID  - MRI brain with and without contrast "Slight interval progression of age advanced generalized cerebral volume loss and abnormal T2/FLAIR hyperintensities throughout the supratentorial white matter with more prominent involvement of the splenium and left periatrial white matter on today's examination.  Although nonspecific, these findings would be in keeping with patient's clinical diagnosis of underlying leukodystrophy." MRA without contrast unremarkable  - empiric thiamine 100mg IV daily and transition to PO upon d/c. Thiamine level pending - will likely not be resulted at time of d/c. Started B12, folate, MVI as well  - PT/OT  - [facility] and      Severe B knee pain - exam unremarkable   - check B knee XR, ESR, CRP, RF, anti-CCP, NATALIYA, dsDNA, uric acid. Consult Rheum if warranted.  She will then need to be referred to [facility] if clinic f/u needed    Anemia, microcytic.  Fe studies consistent with iron deficiency anemia (low fe, low ferritin) with component of anemia of chronic illness (TIBC wnl, likely due to mixed picture).  Blood loss likely from menorrhagia. No overt bleeding at this time. She " is weak (due to neuro disorder as above) so will monitor closely for need for transfusion.   - start oral iron x 3 months with PRN stool softeners upon d/c home (do not start inpatient as non-essential med and can cause constipation)  - defer to outpatient setting for further testing such as Hb electrophoresis and BMBx    Mood disorder  - home paroxetine 20  - home trazodone 50 qhs    Rash B UE with desquamation and pigmentation changes, which she attributes to changing of the seasons.  Possible association with her undiagnosed neurologic disorder.   - U Derm referral     albuterol-ipratropium 2.5mg-0.5mg/3mL nebulizer solution 3 mL, 3 mL, Nebulization, Q4H PRN    Prophylaxis-  enoxaparin    Advance directives - full code    Post-acute care- pending clinical condition, home likely tomorrow with outpt rehab Rx and close f/u at \[facility]\"" neuro (Dr Soni), [facility] genetics (Dr Kaleb Schreiber), establish PCP in [facility] network.  Derm clinic referral.  All external referrals sent by me in [facility]    Time spent in care of patient: 40 mins    Raine Benito MD  Hospital Medicine Staff

## 2017-04-03 NOTE — PLAN OF CARE
Problem: Fall Risk (Adult)  Goal: Absence of Falls  Patient will demonstrate the desired outcomes by discharge/transition of care.   Outcome: Ongoing (interventions implemented as appropriate)  Pt is free of falls and injuries per shift , fall precautions maintained , bed alarm on .     Problem: Patient Care Overview  Goal: Plan of Care Review  Outcome: Ongoing (interventions implemented as appropriate)  Pt is awake ,alert and oriented x 4 . Stable v/S . No C/o pain during the shift . Pt is able to ambulate with a walker to the bathroom . No changes during the shift .

## 2017-04-04 PROBLEM — M25.561 PAIN IN BOTH KNEES: Status: ACTIVE | Noted: 2017-04-04

## 2017-04-04 PROBLEM — M25.562 PAIN IN BOTH KNEES: Status: ACTIVE | Noted: 2017-04-04

## 2017-04-04 LAB
ALBUMIN SERPL BCP-MCNC: 3.5 G/DL
ALP SERPL-CCNC: 86 U/L
ALT SERPL W/O P-5'-P-CCNC: 37 U/L
ANA SER QL IF: NORMAL
ANION GAP SERPL CALC-SCNC: 7 MMOL/L
AST SERPL-CCNC: 17 U/L
BASOPHILS # BLD AUTO: 0.01 K/UL
BASOPHILS NFR BLD: 0.1 %
BILIRUB SERPL-MCNC: 0.2 MG/DL
BUN SERPL-MCNC: 8 MG/DL
CALCIUM SERPL-MCNC: 9.2 MG/DL
CCP AB SER IA-ACNC: <0.5 U/ML
CHLORIDE SERPL-SCNC: 104 MMOL/L
CO2 SERPL-SCNC: 26 MMOL/L
CREAT SERPL-MCNC: 0.6 MG/DL
DIFFERENTIAL METHOD: ABNORMAL
DSDNA AB SER-ACNC: NORMAL [IU]/ML
EOSINOPHIL # BLD AUTO: 0 K/UL
EOSINOPHIL NFR BLD: 0 %
ERYTHROCYTE [DISTWIDTH] IN BLOOD BY AUTOMATED COUNT: 17.9 %
EST. GFR  (AFRICAN AMERICAN): >60 ML/MIN/1.73 M^2
EST. GFR  (NON AFRICAN AMERICAN): >60 ML/MIN/1.73 M^2
GLUCOSE SERPL-MCNC: 88 MG/DL
HCT VFR BLD AUTO: 30 %
HGB BLD-MCNC: 9.4 G/DL
LYMPHOCYTES # BLD AUTO: 2.6 K/UL
LYMPHOCYTES NFR BLD: 14.1 %
MCH RBC QN AUTO: 21.8 PG
MCHC RBC AUTO-ENTMCNC: 31.3 %
MCV RBC AUTO: 70 FL
MONOCYTES # BLD AUTO: 1.5 K/UL
MONOCYTES NFR BLD: 7.8 %
NEUTROPHILS # BLD AUTO: 14.5 K/UL
NEUTROPHILS NFR BLD: 77.7 %
PLATELET # BLD AUTO: 495 K/UL
PMV BLD AUTO: 8.6 FL
POTASSIUM SERPL-SCNC: 3.6 MMOL/L
PROT SERPL-MCNC: 7.7 G/DL
RBC # BLD AUTO: 4.31 M/UL
SODIUM SERPL-SCNC: 137 MMOL/L
VIT B1 SERPL-MCNC: 55 UG/L (ref 38–122)
WBC # BLD AUTO: 18.64 K/UL

## 2017-04-04 PROCEDURE — 99232 SBSQ HOSP IP/OBS MODERATE 35: CPT | Mod: ,,, | Performed by: PSYCHIATRY & NEUROLOGY

## 2017-04-04 PROCEDURE — 25000003 PHARM REV CODE 250: Performed by: HOSPITALIST

## 2017-04-04 PROCEDURE — 80053 COMPREHEN METABOLIC PANEL: CPT

## 2017-04-04 PROCEDURE — 85025 COMPLETE CBC W/AUTO DIFF WBC: CPT

## 2017-04-04 PROCEDURE — 36415 COLL VENOUS BLD VENIPUNCTURE: CPT

## 2017-04-04 PROCEDURE — 11000001 HC ACUTE MED/SURG PRIVATE ROOM

## 2017-04-04 PROCEDURE — 25000003 PHARM REV CODE 250: Performed by: PHYSICIAN ASSISTANT

## 2017-04-04 PROCEDURE — 63600175 PHARM REV CODE 636 W HCPCS: Performed by: HOSPITALIST

## 2017-04-04 PROCEDURE — 99233 SBSQ HOSP IP/OBS HIGH 50: CPT | Mod: ,,, | Performed by: HOSPITALIST

## 2017-04-04 PROCEDURE — 97116 GAIT TRAINING THERAPY: CPT

## 2017-04-04 PROCEDURE — 97110 THERAPEUTIC EXERCISES: CPT

## 2017-04-04 RX ORDER — ACETAMINOPHEN 325 MG/1
650 TABLET ORAL EVERY 6 HOURS
Status: DISCONTINUED | OUTPATIENT
Start: 2017-04-04 | End: 2017-04-06 | Stop reason: HOSPADM

## 2017-04-04 RX ORDER — BACLOFEN 10 MG/1
20 TABLET ORAL 3 TIMES DAILY
Status: DISCONTINUED | OUTPATIENT
Start: 2017-04-04 | End: 2017-04-06 | Stop reason: HOSPADM

## 2017-04-04 RX ADMIN — ACETAMINOPHEN 650 MG: 325 TABLET ORAL at 07:04

## 2017-04-04 RX ADMIN — ACETAMINOPHEN 650 MG: 325 TABLET ORAL at 02:04

## 2017-04-04 RX ADMIN — ACETAMINOPHEN 650 MG: 325 TABLET ORAL at 09:04

## 2017-04-04 RX ADMIN — BACLOFEN 20 MG: 10 TABLET ORAL at 02:04

## 2017-04-04 RX ADMIN — TRAZODONE HYDROCHLORIDE 50 MG: 50 TABLET ORAL at 08:04

## 2017-04-04 RX ADMIN — PAROXETINE HYDROCHLORIDE 20 MG: 10 TABLET, FILM COATED ORAL at 08:04

## 2017-04-04 RX ADMIN — Medication 1 TABLET: at 08:04

## 2017-04-04 RX ADMIN — ENOXAPARIN SODIUM 40 MG: 100 INJECTION SUBCUTANEOUS at 04:04

## 2017-04-04 RX ADMIN — BACLOFEN 10 MG: 10 TABLET ORAL at 05:04

## 2017-04-04 RX ADMIN — THERA TABS 1 TABLET: TAB at 08:04

## 2017-04-04 RX ADMIN — THIAMINE HYDROCHLORIDE 100 MG: 100 INJECTION, SOLUTION INTRAMUSCULAR; INTRAVENOUS at 01:04

## 2017-04-04 RX ADMIN — DEXTROSE MONOHYDRATE: 50 INJECTION, SOLUTION INTRAVENOUS at 08:04

## 2017-04-04 RX ADMIN — BACLOFEN 20 MG: 10 TABLET ORAL at 08:04

## 2017-04-04 NOTE — ASSESSMENT & PLAN NOTE
"Ms. Perales is a 31 year old woman with a complicated recent history, with a new diagnosis of leukodystrophy (though the cause is still undetermined). 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 Her current presentation of "weakness" is more likely due to incoordination as seen on exam, and is consistent with the new lesions seen in her cerebellum. MRA was negative for any vascular abnormalities.    Recommendations:  -- Last day of IV steroids today (to complete 5 day course)  -- Continue baclofen 20 mg tid - spasms improved  -- Thiamine level normal  -- Physical and occupational therapy   -- Patient stable for discharge from Neurology perspective after steroids    "

## 2017-04-04 NOTE — PLAN OF CARE
Problem: Fall Risk (Adult)  Goal: Absence of Falls  Patient will demonstrate the desired outcomes by discharge/transition of care.   Outcome: Ongoing (interventions implemented as appropriate)  Pt is free of falls and injuries per shift , fall precautions maintained  .     Problem: Patient Care Overview  Goal: Plan of Care Review  Outcome: Ongoing (interventions implemented as appropriate)  Pt is awake ,alert and oriented x 4 . Stable V/S . Pt C/O pain in her Lt knee 7/10 , PRN pain medication given as needed and as scheduled . Pt slept between care ,. Pt is able to ambulate in the room with the walker and use the bathroom .

## 2017-04-04 NOTE — PT/OT/SLP PROGRESS
Physical Therapy  Treatment    Richard Perales   MRN: [id number]   Admitting Diagnosis: Leukodystrophy    PT Received On: 04/04/17  PT Start Time: 0953     PT Stop Time: 1021    PT Total Time (min): 28 min       Billable Minutes:  Gait Famgasof87 and Therapeutic Exercise 14    Treatment Type: Treatment  PT/PTA: PT             General Precautions: Standard, fall  Orthopedic Precautions: N/A   Braces: N/A         Subjective:  Communicated with nsg prior to session.  -Pt agreeable to PT session with encouragement    Pain Rating: 3/10  Location - Side: Bilateral     Location: leg  Pain Addressed: Pre-medicate for activity  Pain Rating Post-Intervention: 3/10    Objective:   Patient found with: peripheral IV    Functional Mobility:  Bed Mobility:   Scooting/Bridging: Supervision  Supine to Sit: Supervision    Transfers:  Sit <> Stand Assistance: Supervision x1 trial from EOB  Sit <> Stand Assistive Device: Rolling Walker  Bed <> Chair Technique: Stand Pivot  Bed <> Chair Assistance: Stand By Assistance  Bed <> Chair Assistive Device: Rolling Walker    Gait:   Gait Distance: 150' x1 trial  Assistance 1: Contact Guard Assistance  Gait Assistive Device: Rolling walker  Gait Pattern: reciprocal  Gait Deviation(s): decreased sangeeta, increased time in double stance, decreased velocity of limb motion, decreased step length, decreased stride length     -Pt given cueing for walker mgmt, wider FRANSISCO, and upright posture. Pt showed improvements with stability in WBing (B).    Stairs:  Pt ascended/descend 5 stair(s) with No Assistive Device with (B) rails with Stand-by Assistance.     Balance:   Static Sit: GOOD: Takes MODERATE challenges from all directions  Dynamic Sit: GOOD: Maintains balance through MODERATE excursions of active trunk movement  Static Stand: FAIR+: Takes MINIMAL challenges from all directions  Dynamic stand: FAIR+: Needs CLOSE SUPERVISION during gait and is able to right self with minor LOB     Therapeutic  Activities and Exercises:  -Pt performed sitting therex x15 reps of:  A. AP  B. GS  C. LAQ  D. Hip add with pillow squeeze  E. Hip abd with manual resistance  F. Marching     -Pt educated on:  A. Importance of OOB activity to improve functional outcomes  B. Performing HEP to improve muscular endurance/strength  C. DME mgmt  D. Energy conservation strategies  E. OPPT    AM-PAC 6 CLICK MOBILITY  How much help from another person does this patient currently need?   1 = Unable, Total/Dependent Assistance  2 = A lot, Maximum/Moderate Assistance  3 = A little, Minimum/Contact Guard/Supervision  4 = None, Modified [place]/Independent    Turning over in bed (including adjusting bedclothes, sheets and blankets)?: 4  Sitting down on and standing up from a chair with arms (e.g., wheelchair, bedside commode, etc.): 4  Moving from lying on back to sitting on the side of the bed?: 4  Moving to and from a bed to a chair (including a wheelchair)?: 3  Need to walk in hospital room?: 3  Climbing 3-5 steps with a railing?: 3  Total Score: 21    AM-PAC Raw Score CMS G-Code Modifier Level of Impairment Assistance   6 % Total / Unable   7 - 9 CM 80 - 100% Maximal Assist   10 - 14 CL 60 - 80% Moderate Assist   15 - 19 CK 40 - 60% Moderate Assist   20 - 22 CJ 20 - 40% Minimal Assist   23 CI 1-20% SBA / CGA   24 CH 0% Independent/ Mod I     Patient left up in chair with all lines intact, call button in reach and nsg notified.    Assessment:  Richard Perales is a 31 y.o. female with a medical diagnosis of Leukodystrophy and presents with improved activity tolerance. Pt tolerated session well today. Pt with no LOB during ambulation, t/'s, and stair climbing but reported (B) LE weakness towards end of gait trial. Pt able to make it back to chair without any issues after reporting weakness. Pt is progressing well with therapy and will cont to benefit from skilled therapy services.    Rehab identified problem list/impairments: Rehab  identified problem list/impairments: weakness, impaired functional mobilty, impaired endurance, gait instability, impaired balance, pain, decreased lower extremity function, impaired joint extensibility    Rehab potential is good.    Activity tolerance: Good    Discharge recommendations: Discharge Facility/Level Of Care Needs: outpatient PT     Barriers to discharge: Barriers to Discharge: None    Equipment recommendations: Equipment Needed After Discharge: walker, rolling     GOALS:   Physical Therapy Goals        Problem: Physical Therapy Goal    Goal Priority Disciplines Outcome Goal Variances Interventions   Physical Therapy Goal     PT/OT, PT Ongoing (interventions implemented as appropriate)     Description:  Goals to be met by: 2017     Patient will increase functional independence with mobility by performin. Sit to stand transfer with Modified [place] using RW-not met  2. Bed to chair transfer with Modified [place] using Rolling Walker-not met  3. Gait  x 200 feet with Supervision using Rolling Walker.-not met  4. Ascend/descend 5 stair with bilateral Handrails and supervision-not met  5. Patient will be independent with Lower extremity exercise program  per handout, to improve motor control and coordination for functional mobility. -not met                   PLAN:    Patient to be seen 4 x/week  to address the above listed problems via gait training, therapeutic exercises, neuromuscular re-education, therapeutic activities  Plan of Care expires: 17  Plan of Care reviewed with: patient         Kike Prado, PT  2017

## 2017-04-04 NOTE — PLAN OF CARE
Problem: Physical Therapy Goal  Goal: Physical Therapy Goal  Goals to be met by: 2017     Patient will increase functional independence with mobility by performin. Sit to stand transfer with Modified [place] using RW-not met  2. Bed to chair transfer with Modified [place] using Rolling Walker-not met  3. Gait x 200 feet with Supervision using Rolling Walker.-not met  4. Ascend/descend 5 stair with bilateral Handrails and supervision-not met  5. Patient will be independent with Lower extremity exercise program per handout, to improve motor control and coordination for functional mobility. -not met     Outcome: Ongoing (interventions implemented as appropriate)  Pt tolerated session well today. Pt with no LOB during ambulation, t/'s, and stair climbing but reported (B) LE weakness towards end of gait trial. Pt able to make it back to chair without any issues after reporting weakness. Pt is progressing well with therapy and will cont to benefit from skilled therapy services.

## 2017-04-04 NOTE — SUBJECTIVE & OBJECTIVE
Subjective:     Interval History:  No events overnight. She says that her knee pain improved last night, but this morning has worsened. She is able to ambulate with the walker, but still feels unsteady on her feet. Otherwise no new issues.      Current Facility-Administered Medications   Medication Dose Route Frequency Provider Last Rate Last Dose    acetaminophen tablet 650 mg  650 mg Oral Q4H PRN Donna Mcdonald PA-C   650 mg at 04/04/17 0900    albuterol-ipratropium 2.5mg-0.5mg/3mL nebulizer solution 3 mL  3 mL Nebulization Q4H PRN Donna Mcdonald PA-C        baclofen tablet 20 mg  20 mg Oral TID Benji Uriarte MD        dextrose 50% injection 12.5 g  12.5 g Intravenous PRN Donna Mcdonald PA-C        dextrose 50% injection 25 g  25 g Intravenous PRN Donna Mcdonald PA-C        enoxaparin injection 40 mg  40 mg Subcutaneous Daily Raine Benito MD   40 mg at 04/03/17 1641    folic acid-vit B6-vit B12 2.5-25-2 mg tablet 1 tablet  1 tablet Oral Daily Raine Benito MD   1 tablet at 04/04/17 0856    glucagon (human recombinant) injection 1 mg  1 mg Intramuscular PRN Donna Mcdonald PA-C        glucose chewable tablet 16 g  16 g Oral PRN Donna Mcdonald PA-C        glucose chewable tablet 24 g  24 g Oral PRN Donna Mcdonald PA-C        loperamide capsule 2 mg  2 mg Oral PRN Donna Mcdonald PA-C        methylPREDNISolone sodium succinate (SOLU-MEDROL) 1,000 mg in dextrose 5 % 100 mL IVPB   Intravenous Daily Raine Benito MD        multivitamin tablet 1 tablet  1 tablet Oral Daily Raine Benito MD   1 tablet at 04/04/17 0857    ondansetron disintegrating tablet 8 mg  8 mg Oral Q8H PRN Donna Mcdonald PA-C        paroxetine tablet 20 mg  20 mg Oral Daily Donna Mcdonald PA-C   20 mg at 04/04/17 0856    polyethylene glycol packet 17 g  17 g Oral TID PRN Donna Mcdonald PA-C        promethazine tablet 25 mg  25 mg Oral Q6H PRN Donna MALLOY  ELLEN Mcdonald        ramelteon tablet 8 mg  8 mg Oral Nightly PRN Donna Mcdonald PA-C   8 mg at 04/02/17 0031    thiamine (B-1) 100 mg in dextrose 5 % 50 mL IVPB  100 mg Intravenous Q24H Raine Benito MD 12.5 mL/hr at 04/03/17 1433 100 mg at 04/03/17 1433    trazodone tablet 50 mg  50 mg Oral QHS Donna Mcdonald PA-C   50 mg at 04/03/17 2146       Review of Systems   Constitutional: Negative for fever.   Gastrointestinal: Negative for abdominal pain.   Musculoskeletal: Positive for arthralgias (Bilateral knee pain). Negative for myalgias.   Psychiatric/Behavioral: Negative for agitation.     Objective:     Vital Signs (Most Recent):  Temp: 99 °F (37.2 °C) (04/04/17 0837)  Pulse: 88 (04/04/17 0837)  Resp: 17 (04/04/17 0837)  BP: 130/73 (04/04/17 0837)  SpO2: 98 % (04/04/17 0837) Vital Signs (24h Range):  Temp:  [98.1 °F (36.7 °C)-99 °F (37.2 °C)] 99 °F (37.2 °C)  Pulse:  [67-88] 88  Resp:  [17-18] 17  SpO2:  [96 %-98 %] 98 %  BP: (119-139)/(69-83) 130/73     Weight: 94.4 kg (208 lb 1.6 oz)  Body mass index is 35.72 kg/(m^2).    Physical Exam   Constitutional: She is oriented to person, place, and time. She appears well-developed and well-nourished.   Eyes: EOM are normal. Pupils are equal, round, and reactive to light.   Pulmonary/Chest: Effort normal.   Neurological: She is oriented to person, place, and time. She has an abnormal Finger-Nose-Finger Test (L>R. Also with Disdiadochokinesis (L>R) ) and an abnormal Heel to Gomez Test (L>R).   Psychiatric: Her speech is normal.       NEUROLOGICAL EXAMINATION:     MENTAL STATUS   Oriented to person, place, and time.   Speech: speech is normal   Level of consciousness: alert    CRANIAL NERVES     CN III, IV, VI   Pupils are equal, round, and reactive to light.  Extraocular motions are normal.   Nystagmus type: Difficulty with smooth pursuit bilaterally.    CN VII   Facial expression full, symmetric.     CN XI   CN XI normal.     MOTOR EXAM   Muscle bulk:  normal  Overall muscle tone: normal    SENSORY EXAM   Light touch normal.     GAIT AND COORDINATION      Coordination   Finger to nose coordination: abnormal (L>R. Also with Disdiadochokinesis (L>R) )  Heel to shin coordination: abnormal (L>R)    Tremor   Resting tremor: absent  Intention tremor: absent      Significant Labs:   CBC:     Recent Labs  Lab 04/03/17  0458 04/04/17  0809   WBC 13.15* 18.64*   HGB 8.2* 9.4*   HCT 26.2* 30.0*   * 495*     CMP:     Recent Labs  Lab 04/04/17  0809   GLU 88      K 3.6      CO2 26   BUN 8   CREATININE 0.6   CALCIUM 9.2   PROT 7.7   ALBUMIN 3.5   BILITOT 0.2   ALKPHOS 86   AST 17   ALT 37   ANIONGAP 7*   EGFRNONAA >60.0       Long Chain Fatty Acids:     Ref. Range 3/3/2016 13:01 3/3/2016 13:02   C22:0 Latest Ref Range: <=96.3 nmol/mL 59.0      C24:0 Latest Ref Range: <=91.4 nmol/mL 37.9      C26:0 Latest Ref Range: <=1.30 nmol/mL 0.25      C24:0/C22:0 Latest Ref Range: <=1.39 ratio 0.64      C26:0/C22:0 Latest Ref Range: <=0.023 ratio 0.004      Pristanic Acid Latest Ref Range: <=2.98 nmol/mL 0.04      Phytanic Acid Latest Ref Range: <=9.88 nmol/mL 0.19      Pristanic/Phytanic Latest Ref Range: <=0.39 ratio 0.21      Long Chain Fatty Acids Unknown SEE BELOW      Acetylcarnitine Latest Ref Range: 5-29 umol/L    16       Arylsulfatase A,             108           nmol/h/mg  >=62   Leukocytes   Interpretation                                                 In this sample, arylsulfatase A activity is not reduced. This result indicates that this individual is not affected with either metachromatic leukodystrophy (MLD) or multiple sulfatase deficiency. If the clinical presentation is suggestive of MLD, consider urine sulfatide analysis (MML test CTSA) which is expected to be abnormal in patients with saposin B deficiency due to mutations in the prosaposin gene.      Alpha-Galactosidase, Leukocytes   Reason for Referral        Not provided    -------------------ADDITIONAL INFORMATION-------------------   Fluorometric Enzyme Assay   Alpha-Galactosidase,       66.9          nmol/h/mg  >=23.1   Leukocytes   In this specimen, the activity of alpha-galactosidase is normal, indicating this patient is most likely NOT a carrier for Fabry disease (OMIM [id number]). However, please note that enzyme analysis is, in general, not sufficiently sensitive to detect all carrier females (see Johnnyu WL et al. Hum Mutat 2009; 30: 1-9; and Patricia MENDEZ et al. Mol Janae Metab. 2008; 93: 112-128). Molecular genetic analysis of the GLA gene (MML test FABRZ/Fabry Disease, Full Gene Analysis) is the most reliable method of detecting carriers. If not already ordered, this could be done on the existing sample by calling MML within one month.      Ceramide Trihex and Sulfatide, U   In this sample, the pattern of elevated excretions of ceramide trihexoside species is suggestive of a biochemical diagnosis of Fabry disease (OMIM [id number]). Please contact the Biochemical Genetics consultant or genetic counselor on call ([phone]) if you have any questions.      Other Labs: abnormal bolded  Carnitine Free 35  Carnitine Plasma 51  Pyruvic acid 0.047  HIV pcr negative  Ceruloplasmin 31  Copper, serum  Aldolase 12.8 (H)  Ammonia 34  ACE 29  Anti-thyroglobulin ab <4.0  Thiamine 28 (L)  Vitamin B6 20  Vitamin B2 3  Vitamin B3 2.31  Folate 12.8  NMO negative         OUTSIDE CSF (2/12/16):  WCB 8  CBC 32  Gluc 71  Prot 19  CMV neg  HSV1 igg 0.06  HSV2 igG 0.63  Serum hiv neg (unknown what specific test)        24hr EEG 3/2-3/3 without seizures, slow background (prelim read)        Significant Imaging: I have reviewed all pertinent imaging results/findings within the past 24 hours.   Slight interval progression of age advanced generalized cerebral volume loss and abnormal T2/FLAIR hyperintensities throughout the supratentorial white matter with more prominent involvement of the splenium and left  periatrial white matter on today's examination.  Although nonspecific, these findings would be in keeping with patient's clinical diagnosis of underlying leukodystrophy.  Short-term followup is suggested.

## 2017-04-04 NOTE — PROGRESS NOTES
[facility]  Neurology  Progress Note    Patient Name: Richard Perales  MRN: [id number]  Admission Date: 3/29/2017  Hospital Length of Stay: 6 days  Code Status: Full Code   Attending Provider: Benji Uriarte MD  Primary Care Physician: Primary Doctor No   Principal Problem:Leukodystrophy      Subjective:     Interval History:  No events overnight. She says that her knee pain improved last night, but this morning has worsened. She is able to ambulate with the walker, but still feels unsteady on her feet. Otherwise no new issues.      Current Facility-Administered Medications   Medication Dose Route Frequency Provider Last Rate Last Dose    acetaminophen tablet 650 mg  650 mg Oral Q4H PRN Donna Mcdonald PA-C   650 mg at 04/04/17 0900    albuterol-ipratropium 2.5mg-0.5mg/3mL nebulizer solution 3 mL  3 mL Nebulization Q4H PRN Donna Mcdonald PA-C        baclofen tablet 20 mg  20 mg Oral TID Benji Uriarte MD        dextrose 50% injection 12.5 g  12.5 g Intravenous PRN Donna Mcdonald PA-C        dextrose 50% injection 25 g  25 g Intravenous PRN Donna Mcdonald PA-C        enoxaparin injection 40 mg  40 mg Subcutaneous Daily Raine Benito MD   40 mg at 04/03/17 1641    folic acid-vit B6-vit B12 2.5-25-2 mg tablet 1 tablet  1 tablet Oral Daily Raine Benito MD   1 tablet at 04/04/17 0856    glucagon (human recombinant) injection 1 mg  1 mg Intramuscular PRN Donna Mcdonald PA-C        glucose chewable tablet 16 g  16 g Oral PRN Donna Mcdonald PA-C        glucose chewable tablet 24 g  24 g Oral PRN Donna Mcdonald PA-C        loperamide capsule 2 mg  2 mg Oral PRN Donna Mcdonald PA-C        methylPREDNISolone sodium succinate (SOLU-MEDROL) 1,000 mg in dextrose 5 % 100 mL IVPB   Intravenous Daily Raine Benito MD        multivitamin tablet 1 tablet  1 tablet Oral Daily Raine Benito MD   1 tablet at 04/04/17 0857    ondansetron  disintegrating tablet 8 mg  8 mg Oral Q8H PRN Donna Mcdonald PA-C        paroxetine tablet 20 mg  20 mg Oral Daily Donna Mcdonald PA-C   20 mg at 04/04/17 0856    polyethylene glycol packet 17 g  17 g Oral TID PRN Donna Mcdonald PA-C        promethazine tablet 25 mg  25 mg Oral Q6H PRN Donna Mcdonald PA-C        ramelteon tablet 8 mg  8 mg Oral Nightly PRN Donna Mcdonald PA-C   8 mg at 04/02/17 0031    thiamine (B-1) 100 mg in dextrose 5 % 50 mL IVPB  100 mg Intravenous Q24H Raine Benito MD 12.5 mL/hr at 04/03/17 1433 100 mg at 04/03/17 1433    trazodone tablet 50 mg  50 mg Oral QHS Donna Mcdonald PA-C   50 mg at 04/03/17 2146       Review of Systems   Constitutional: Negative for fever.   Gastrointestinal: Negative for abdominal pain.   Musculoskeletal: Positive for arthralgias (Bilateral knee pain). Negative for myalgias.   Psychiatric/Behavioral: Negative for agitation.     Objective:     Vital Signs (Most Recent):  Temp: 99 °F (37.2 °C) (04/04/17 0837)  Pulse: 88 (04/04/17 0837)  Resp: 17 (04/04/17 0837)  BP: 130/73 (04/04/17 0837)  SpO2: 98 % (04/04/17 0837) Vital Signs (24h Range):  Temp:  [98.1 °F (36.7 °C)-99 °F (37.2 °C)] 99 °F (37.2 °C)  Pulse:  [67-88] 88  Resp:  [17-18] 17  SpO2:  [96 %-98 %] 98 %  BP: (119-139)/(69-83) 130/73     Weight: 94.4 kg (208 lb 1.6 oz)  Body mass index is 35.72 kg/(m^2).    Physical Exam   Constitutional: She is oriented to person, place, and time. She appears well-developed and well-nourished.   Eyes: EOM are normal. Pupils are equal, round, and reactive to light.   Pulmonary/Chest: Effort normal.   Neurological: She is oriented to person, place, and time. She has an abnormal Finger-Nose-Finger Test (L>R. Also with Disdiadochokinesis (L>R) ) and an abnormal Heel to Gomez Test (L>R).   Psychiatric: Her speech is normal.       NEUROLOGICAL EXAMINATION:     MENTAL STATUS   Oriented to person, place, and time.   Speech: speech is normal    Level of consciousness: alert    CRANIAL NERVES     CN III, IV, VI   Pupils are equal, round, and reactive to light.  Extraocular motions are normal.   Nystagmus type: Difficulty with smooth pursuit bilaterally.    CN VII   Facial expression full, symmetric.     CN XI   CN XI normal.     MOTOR EXAM   Muscle bulk: normal  Overall muscle tone: normal    SENSORY EXAM   Light touch normal.     GAIT AND COORDINATION      Coordination   Finger to nose coordination: abnormal (L>R. Also with Disdiadochokinesis (L>R) )  Heel to shin coordination: abnormal (L>R)    Tremor   Resting tremor: absent  Intention tremor: absent      Significant Labs:   CBC:     Recent Labs  Lab 04/03/17  0458 04/04/17  0809   WBC 13.15* 18.64*   HGB 8.2* 9.4*   HCT 26.2* 30.0*   * 495*     CMP:     Recent Labs  Lab 04/04/17  0809   GLU 88      K 3.6      CO2 26   BUN 8   CREATININE 0.6   CALCIUM 9.2   PROT 7.7   ALBUMIN 3.5   BILITOT 0.2   ALKPHOS 86   AST 17   ALT 37   ANIONGAP 7*   EGFRNONAA >60.0       Long Chain Fatty Acids:     Ref. Range 3/3/2016 13:01 3/3/2016 13:02   C22:0 Latest Ref Range: <=96.3 nmol/mL 59.0      C24:0 Latest Ref Range: <=91.4 nmol/mL 37.9      C26:0 Latest Ref Range: <=1.30 nmol/mL 0.25      C24:0/C22:0 Latest Ref Range: <=1.39 ratio 0.64      C26:0/C22:0 Latest Ref Range: <=0.023 ratio 0.004      Pristanic Acid Latest Ref Range: <=2.98 nmol/mL 0.04      Phytanic Acid Latest Ref Range: <=9.88 nmol/mL 0.19      Pristanic/Phytanic Latest Ref Range: <=0.39 ratio 0.21      Long Chain Fatty Acids Unknown SEE BELOW      Acetylcarnitine Latest Ref Range: 5-29 umol/L    16       Arylsulfatase A,             108           nmol/h/mg  >=62   Leukocytes   Interpretation                                                 In this sample, arylsulfatase A activity is not reduced. This result indicates that this individual is not affected with either metachromatic leukodystrophy (MLD) or multiple sulfatase deficiency.  If the clinical presentation is suggestive of MLD, consider urine sulfatide analysis (MML test CTSA) which is expected to be abnormal in patients with saposin B deficiency due to mutations in the prosaposin gene.      Alpha-Galactosidase, Leukocytes   Reason for Referral        Not provided   -------------------ADDITIONAL INFORMATION-------------------   Fluorometric Enzyme Assay   Alpha-Galactosidase,       66.9          nmol/h/mg  >=23.1   Leukocytes   In this specimen, the activity of alpha-galactosidase is normal, indicating this patient is most likely NOT a carrier for Fabry disease (OMIM [id number]). However, please note that enzyme analysis is, in general, not sufficiently sensitive to detect all carrier females (see Johnnyu WL et al. Hum Mutat 2009; 30: 1-9; and Patricia MENDEZ et al. Mol Janae Metab. 2008; 93: 112-128). Molecular genetic analysis of the GLA gene (MML test FABRZ/Fabry Disease, Full Gene Analysis) is the most reliable method of detecting carriers. If not already ordered, this could be done on the existing sample by calling MML within one month.      Ceramide Trihex and Sulfatide, U   In this sample, the pattern of elevated excretions of ceramide trihexoside species is suggestive of a biochemical diagnosis of Fabry disease (OMIM [id number]). Please contact the Biochemical Genetics consultant or genetic counselor on call ([phone]) if you have any questions.      Other Labs: abnormal bolded  Carnitine Free 35  Carnitine Plasma 51  Pyruvic acid 0.047  HIV pcr negative  Ceruloplasmin 31  Copper, serum  Aldolase 12.8 (H)  Ammonia 34  ACE 29  Anti-thyroglobulin ab <4.0  Thiamine 28 (L)  Vitamin B6 20  Vitamin B2 3  Vitamin B3 2.31  Folate 12.8  NMO negative         OUTSIDE CSF (2/12/16):  WCB 8  CBC 32  Gluc 71  Prot 19  CMV neg  HSV1 igg 0.06  HSV2 igG 0.63  Serum hiv neg (unknown what specific test)        24hr EEG 3/2-3/3 without seizures, slow background (prelim read)        Significant Imaging: I have  "reviewed all pertinent imaging results/findings within the past 24 hours.   Slight interval progression of age advanced generalized cerebral volume loss and abnormal T2/FLAIR hyperintensities throughout the supratentorial white matter with more prominent involvement of the splenium and left periatrial white matter on today's examination.  Although nonspecific, these findings would be in keeping with patient's clinical diagnosis of underlying leukodystrophy.  Short-term followup is suggested.          Assessment and Plan:     * Leukodystrophy  Ms. Perales is a 31 year old woman with a complicated recent history, with a new diagnosis of leukodystrophy (though the cause is still undetermined). 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 Her current presentation of "weakness" is more likely due to incoordination as seen on exam, and is consistent with the new lesions seen in her cerebellum. MRA was negative for any vascular abnormalities.    Recommendations:  -- Last day of IV steroids today (to complete 5 day course)  -- Continue baclofen 20 mg tid - spasms improved  -- Thiamine level normal  -- Physical and occupational therapy   -- Patient stable for discharge from Neurology perspective after steroids        VTE Risk Mitigation         Ordered     enoxaparin injection 40 mg  Daily     Route:  Subcutaneous        04/01/17 1042     Medium Risk of VTE  Once      03/30/17 0021     Place sequential compression device  Until discontinued      03/30/17 0021          Kinjal Oneal MD  Neurology  [facility]  "

## 2017-04-04 NOTE — ASSESSMENT & PLAN NOTE
"Ms. Perales is a 31 year old woman with a complicated recent history, with a new diagnosis of leukodystrophy (though the cause is still undetermined). 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 Her current presentation of "weakness" is more likely due to incoordination as seen on exam, and is consistent with the new lesions seen in her cerebellum. MRA was negative for any vascular abnormalities.    Recommendations:  -- Last day of IV steroids today (to complete 5 day course)  -- Continue baclofen 20 mg tid - spasms improved  -- Thiamine level normal  -- Physical and occupational therapy    "

## 2017-04-05 VITALS
SYSTOLIC BLOOD PRESSURE: 117 MMHG | DIASTOLIC BLOOD PRESSURE: 70 MMHG | TEMPERATURE: 98 F | OXYGEN SATURATION: 97 % | RESPIRATION RATE: 18 BRPM | HEART RATE: 71 BPM | BODY MASS INDEX: 35.53 KG/M2 | WEIGHT: 208.13 LBS | HEIGHT: 64 IN

## 2017-04-05 PROCEDURE — 25000003 PHARM REV CODE 250: Performed by: HOSPITALIST

## 2017-04-05 PROCEDURE — 25000003 PHARM REV CODE 250: Performed by: PHYSICIAN ASSISTANT

## 2017-04-05 PROCEDURE — 99239 HOSP IP/OBS DSCHRG MGMT >30: CPT | Mod: ,,, | Performed by: HOSPITALIST

## 2017-04-05 RX ORDER — BACLOFEN 20 MG/1
20 TABLET ORAL 3 TIMES DAILY
Qty: 90 TABLET | Refills: 0 | Status: SHIPPED | OUTPATIENT
Start: 2017-04-05 | End: 2017-05-26 | Stop reason: SDUPTHER

## 2017-04-05 RX ORDER — FERROUS SULFATE 324(65)MG
325 TABLET, DELAYED RELEASE (ENTERIC COATED) ORAL DAILY
Refills: 0 | COMMUNITY
Start: 2017-04-05 | End: 2017-07-31

## 2017-04-05 RX ORDER — FERROUS SULFATE 325(65) MG
325 TABLET, DELAYED RELEASE (ENTERIC COATED) ORAL DAILY
Status: DISCONTINUED | OUTPATIENT
Start: 2017-04-06 | End: 2017-04-06 | Stop reason: HOSPADM

## 2017-04-05 RX ORDER — AMOXICILLIN 250 MG
2 CAPSULE ORAL DAILY
COMMUNITY
Start: 2017-04-05 | End: 2017-07-31

## 2017-04-05 RX ORDER — ACETAMINOPHEN 325 MG/1
650 TABLET ORAL EVERY 6 HOURS
Refills: 0 | COMMUNITY
Start: 2017-04-05

## 2017-04-05 RX ADMIN — ACETAMINOPHEN 650 MG: 325 TABLET ORAL at 12:04

## 2017-04-05 RX ADMIN — Medication 1 TABLET: at 09:04

## 2017-04-05 RX ADMIN — BACLOFEN 20 MG: 10 TABLET ORAL at 05:04

## 2017-04-05 RX ADMIN — BACLOFEN 20 MG: 10 TABLET ORAL at 02:04

## 2017-04-05 RX ADMIN — ACETAMINOPHEN 650 MG: 325 TABLET ORAL at 05:04

## 2017-04-05 RX ADMIN — RAMELTEON 8 MG: 8 TABLET, FILM COATED ORAL at 12:04

## 2017-04-05 RX ADMIN — THERA TABS 1 TABLET: TAB at 09:04

## 2017-04-05 RX ADMIN — PAROXETINE HYDROCHLORIDE 20 MG: 10 TABLET, FILM COATED ORAL at 09:04

## 2017-04-05 NOTE — PROGRESS NOTES
D/c instructions reviewed and given. Pt verbalized understanding. IV removed with catheter intact. Pt not suitable for dc lounge. Waiting at bedside for family to arrive

## 2017-04-05 NOTE — PLAN OF CARE
"   04/05/17 1050   Final Note   Assessment Type Final Discharge Note   Discharge Disposition Home   Discharge planning education complete? Yes   What phone number can be called within the next 1-3 days to see how you are doing after discharge? [phone]   Hospital Follow Up  Appt(s) scheduled? No  (Referrals sent to [facility])   Discharge plans and expectations educations in teach back method with documentation complete? No   Offered [facility] -- Bedside Delivery? n/a   Discharge/Hospital Encounter Summary to (non-[facility]) PCP n/a   Referral to Outpatient Case Management complete? No   Referral to / orders for Home Health Complete? n/a   30 day supply of medicines given at discharge, if documented non-compliance / non-adherence? n/a   Any social issues identified prior to discharge? No   Did you assess the readiness or willingness of the family or caregiver to support self management of care? Yes   Right Care Referral Info   Post Acute Recommendation Other   Facility Name Out patient PT at [facility]     Patient awake & alert sitting in recliner when CM rounded. No family at the bedside. Patient in agreement with plan to discharge home with out patient PT today. Patient stated that she would like to receive out patient PT at [facility]. Printed order for "ambulatory referral to physical/occupational therapy" given to patient. Ambulatory referrals for neuro, internal medicine, genetics, dermatology, & rheumatology to be faxed to [facility] per patient's request. Patient denied the need for assistance with transportation at time of discharge. CM informed the patient's nurse, Raine ([id number]), of discharge status. No additional needs noted at this time.  "

## 2017-04-05 NOTE — PHYSICIAN QUERY
PT Name: Richard Perales  MR #: [id number]     Physician Query Form - Documentation Clarification      CDS: Saida Bailey RN                 Contact information: [id number] [phone]    This form is a permanent document in the medical record.     Query Date: April 5, 2017    By submitting this query, we are merely seeking further clarification of documentation. Please utilize your independent clinical judgment when addressing the question(s) below.    The Medical record reflects the following:    Supporting Clinical Findings Location in Medical Record    Mood disorder      paroxetine tablet 20 mg Freq: Daily Route: Oral      trazodone tablet 50 mg Freq: Nightly Route: Oral       PN 3/31/17     MAR 3/30/17     MAR 3/30/17                                                                                Doctor, Please specify diagnosis or diagnoses associated with above clinical findings.        Provider Use Only        [ x ] Persistent depression      [  ] Recurrent depression      [  ] Single episode depression      [  ] Other _______________________                                                                                                             [  ] Clinically undetermined

## 2017-04-05 NOTE — DISCHARGE SUMMARY
"[facility] Hospital Medicine  Discharge Summary      Patient Name: Richard Perales  MRN: [id number]  Admission Date: 3/29/2017  Hospital Length of Stay: 7 days  Discharge Date and Time:  04/05/2017 9:32 PM  Attending Physician: Benji Uriarte MD   Discharging Provider: Benji Uriarte MD  Primary Care Provider: Primary Doctor [facility] Medicine Team: [facility]  Benji Uriarte MD    HPI: Ms. Perales is a 31 year old woman with a complicated history, who is admitted for worsening weakness. She initially presented to [facility] in February 2016, complaining of visual changes (unable to describe, ?blurry) and generalized weakness since October 2015. MRI brain showed non-specific white matter changes concerning for leukodystrophy vs demyelination. She was [place] [facility] [place] March 2016. Her symptoms progressed to her being nonverbal and paraplegic, and she would reportedly alternate between catatonia and severe weakness. Workup included LP which was unremarkable, including negative for MS. Autoimmune, porphyria and infectious workup was also negative (including HIV). She did have a breast abscess that was aspirated, and subsequent bacteremia (staph) while at [facility] that was treated with IV antibiotics. She also developed a scaly rash, and biopsy showed "pityriasis rubra pilaris".      She also had an EEG and EMG which were negative. Diagnosis was unclear, but the differential was a leukodystrophy. She was treated with IV steroids with a PO taper, to which she responded well. She was discharged to inpatient rehab, where she regained strength and was eventually able to walk unassisted. She has been following with Dr. Du as an outpatient, who is continuing with investigations including genetic studies. She had contradicting results for Fabry disease, including normal alpha-galactoside activity but increased ceramide trihexoside species.      She says that " "when she returned from her Neurology appointment last week, she had a fall due to lower extremity weakness. She says since then she has been ambulating with a walker and had a few falls. She says that she feels her strength is there while lying, but she feels weak when she stands up. She also describes a sensation of her nerves shaking and says her vision has worsened. She is unable to characterize her vision changes, but says "you could say it's blurry" even with her glasses.       * No surgery found *      Indwelling Lines/Drains at time of discharge:   Lines/Drains/Airways          No matching active lines, drains, or airways        Hospital Course: Patient was evaluated by neurology - MRI scan of the brain - Slight interval progression of age advanced generalized cerebral volume loss and abnormal T2/FLAIR hyperintensities throughout the supratentorial white matter with more prominent involvement of the splenium and left periatrial white matter on today's examination.  Although nonspecific, these findings would be in keeping with patient's clinical diagnosis of underlying leukodystrophy.MRA without contrast unremarkable 24hr EEG 3/2-3/3 without seizures, slow background (prelim read),     Neurology work up as follows    Long Chain Fatty Acids:     Ref. Range 3/3/2016 13:01 3/3/2016 13:02   C22:0 Latest Ref Range: <=96.3 nmol/mL 59.0      C24:0 Latest Ref Range: <=91.4 nmol/mL 37.9      C26:0 Latest Ref Range: <=1.30 nmol/mL 0.25      C24:0/C22:0 Latest Ref Range: <=1.39 ratio 0.64      C26:0/C22:0 Latest Ref Range: <=0.023 ratio 0.004      Pristanic Acid Latest Ref Range: <=2.98 nmol/mL 0.04      Phytanic Acid Latest Ref Range: <=9.88 nmol/mL 0.19      Pristanic/Phytanic Latest Ref Range: <=0.39 ratio 0.21      Long Chain Fatty Acids Unknown SEE BELOW      Acetylcarnitine Latest Ref Range: 5-29 umol/L    16       Arylsulfatase A,             108           nmol/h/mg  >=62   Leukocytes   Interpretation             "                                     In this sample, arylsulfatase A activity is not reduced. This result indicates that this individual is not affected with either metachromatic leukodystrophy (MLD) or multiple sulfatase deficiency. If the clinical presentation is suggestive of MLD, consider urine sulfatide analysis (MML test CTSA) which is expected to be abnormal in patients with saposin B deficiency due to mutations in the prosaposin gene.       Alpha-Galactosidase, Leukocytes   Reason for Referral        Not provided   -------------------ADDITIONAL INFORMATION-------------------   Fluorometric Enzyme Assay   Alpha-Galactosidase,       66.9          nmol/h/mg  >=23.1   Leukocytes   In this specimen, the activity of alpha-galactosidase is normal, indicating this patient is most likely NOT a carrier for Fabry disease (OMIM 689546). However, please note that enzyme analysis is, in general, not sufficiently sensitive to detect all carrier females (see Johnnyu WL et al. Hum Mutat 2009; 30: 1-9; and Patricia WR et al. Mol Janae Metab. 2008; 93: 112-128). Molecular genetic analysis of the GLA gene (MML test FABRZ/Fabry Disease, Full Gene Analysis) is the most reliable method of detecting carriers. If not already ordered, this could be done on the existing sample by calling MML within one month.       Ceramide Trihex and Sulfatide, U   In this sample, the pattern of elevated excretions of ceramide trihexoside species is suggestive of a biochemical diagnosis of Fabry disease (OMIM 899154). Please contact the Biochemical Genetics consultant or genetic counselor on call ([phone]) if you have any questions.       Other Labs: abnormal bolded  Carnitine Free 35  Carnitine Plasma 51  Pyruvic acid 0.047  HIV pcr negative  Ceruloplasmin 31  Copper, serum  Aldolase 12.8 (H)  Ammonia 34  ACE 29  Anti-thyroglobulin ab <4.0  Thiamine 28 (L)  Vitamin B6 20  Vitamin B2 3  Vitamin B3 2.31  Folate 12.8  NMO negative          OUTSIDE CSF  "(2/12/16):  WCB 8  CBC 32  Gluc 71  Prot 19  CMV neg  HSV1 igg 0.06  HSV2 igG 0.63  Serum hiv neg (unknown what specific test)           Cerebellar signs noted in extraocular eye movement, speech and left > right UE and LE movement.On admission started on IV Solu-Medrol for five days course With symptomatic improvement. 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 Her current presentation of "weakness" is more likely due to incoordination as seen on exam, and is consistent with the new lesions seen in her cerebellum. Patient also had bilateral knee pain-x-rays of the knees negative for fractures, RF, NATALIYA, Anti double-stranded DNA antibody, ESR, CRP,anti-CCP, negative, uric acid, normal.  Patient had improvement in pain with schedule Tylenol dosage.  Baclofen dose was increased to 20 mg TID for muscle spasms.  She is advised to have close f/u at [facility] neuro (Dr Soni), [facility] genetics (Dr Kaleb Schreiber), Rheumatology, establish PCP in [facility] network. Derm clinic referral (Rash Bilateral UE with desquamation and pigmentation changes, which she attributes to changing of the seasons) . Repeat thiamine levels, normal Patient was started on ferrous sulfate for microcytic anemia, stable Hb, likely from  menorrhagia. Patient is being discharged with outpatient physical therapy       Consults:   Consults         Status Ordering Provider     Case Management/  Once     Provider:  (Not yet assigned)    GRAZYNA Zimmerman     Inpatient consult to Neurology  Once     Provider:  (Not yet assigned)    GRAZYNA Zimmerman          Significant Diagnostic Studies: Labs:   BMP:     Recent Labs  Lab 04/04/17  0809   GLU 88      K 3.6      CO2 26   BUN 8   CREATININE 0.6   CALCIUM 9.2   , CMP     Recent Labs  Lab 04/04/17  0809      K 3.6      CO2 26   GLU 88 "   BUN 8   CREATININE 0.6   CALCIUM 9.2   PROT 7.7   ALBUMIN 3.5   BILITOT 0.2   ALKPHOS 86   AST 17   ALT 37   ANIONGAP 7*   ESTGFRAFRICA >60.0   EGFRNONAA >60.0   , CBC     Recent Labs  Lab 04/04/17  0809   WBC 18.64*   HGB 9.4*   HCT 30.0*   *   , INR   Lab Results   Component Value Date    INR 0.9 03/15/2016    INR 1.0 03/02/2016   , Lipid Panel No results found for: CHOL, HDL, LDLCALC, TRIG, CHOLHDL, Troponin No results for input(s): TROPONINI in the last 168 hours. and All labs within the past 24 hours have been reviewed  Microbiology:   Blood Culture   Lab Results   Component Value Date    LABBLOO No growth after 5 days. 03/02/2016    LABBLOO No growth after 5 days. 03/02/2016   , Sputum Culture No results found for: GSRESP, RESPIRATORYC and Urine Culture    Lab Results   Component Value Date    LABURIN No growth 03/26/2016     Radiology:   Imaging Results         X-Ray Knee 1 or 2 View Bilateral (Final result) Result time:  04/04/17 08:00:14    Final result by Ramos Hopkins III, MD (04/04/17 08:00:14)    Narrative:    2 views bilateral    Right: No fracture dislocation bone distraction seen.    Left: No fracture dislocation bone obstruction seen.      Electronically signed by: RAMOS HOPKINS  Date:     04/04/17  Time:    08:00             MRA Brain without contrast (Final result) Result time:  03/30/17 21:41:57    Final result by Quinn Braun MD (03/30/17 21:41:57)    Impression:      No evidence of abnormal intracranial vasculature.  ______________________________________     Electronically signed by resident: QUINN BRAUN MD  Date:     03/30/17  Time:    21:24            As the supervising and teaching physician, I personally reviewed the images and resident's interpretation and I agree with the findings.          Electronically signed by: PAULA GARCIA MD  Date:     03/30/17  Time:    21:41     Narrative:    MRA head    03/30/17 20:05:16    Accession# [id number]    CLINICAL INDICATION: 31 year old F with  Cerebral leukodystrophy of unknown etiology, with worsened weakness,     TECHNIQUE: 3-D time-of-flight noncontrast MR angiogram of the intracranial vasculature.  Multiple MIP reconstructions were performed.    COMPARISON: MRI brain 3/29/2017.    FINDINGS:  The visualized ICAs are normal in caliber.  The vertebrobasilar system appears within normal limits. The ACAs, MCAs and PCAs demonstrate no evidence of  high-grade stenosis, focal occlusion or intracranial aneurysm.  There are bilateral posterior communicating arteries.            MRI Brain W WO Contrast (Final result) Result time:  03/29/17 21:05:12    Final result by Beto Lion MD (03/29/17 21:05:12)    Impression:      Slight interval progression of age advanced generalized cerebral volume loss and abnormal T2/FLAIR hyperintensities throughout the supratentorial white matter with more prominent involvement of the splenium and left periatrial white matter on today's examination.  Although nonspecific, these findings would be in keeping with patient's clinical diagnosis of underlying leukodystrophy.  Short-term followup is suggested.    No evidence of abnormal enhancement.    No acute infarct or hemorrhage.  No hydrocephalus.        ______________________________________     Electronically signed by resident: BETO LION MD  Date:     03/29/17  Time:    20:23            As the supervising and teaching physician, I personally reviewed the images and resident's interpretation and I agree with the findings.          Electronically signed by: PAULA GARCIA MD  Date:     03/29/17  Time:    21:05     Narrative:    MRI brain with contrast    03/29/17 19:37:36    Accession# [id number]    CLINICAL INDICATION: 31 year old F with history of progressive lower extremities weakness     TECHNIQUE: Multiplanar multisequence MR imaging of the brain was performed before and after the administration of 10 cc of Gadavistintravenous contrast.     COMPARISON:  MRI  3/8/2016.    FINDINGS:  The ventricles are stable in size without evidence of hydrocephalus.  There is redemonstration of age advanced generalized cerebral volume loss with increase sulcation slightly increased compared to the prior examination from 3/8/2016.  There is redemonstration of scattered abnormal T2/FLAIR hyperintensities within the supratentorial white matter bilaterally with more confluent area of involvement within the left periatrial region.  There is also involvement of the splenium of the corpus callosum with infratentorial extension to the dentate nuclei via the middle cerebellar peduncles.  These findings have overall slightly progressed.    No evidence of acute infarct or hemorrhage.  No abnormal extra-axial blood or fluid collections.  There is no abnormal parenchymal or leptomeningeal enhancement.    No midline shift or mass effect.  The major skull base T2 flow voids are present.  The visualized paranasal sinuses and mastoid air cells are clear.  Osseous marrow signal is within normal limits.  Structures within the sella and suprasellar region are within normal limits.  The basilar cisterns are patent.                Pending Diagnostic Studies:     None        Final Active Diagnoses:    Diagnosis Date Noted POA    PRINCIPAL PROBLEM:  Leukodystrophy [E75.29] 03/21/2016 Yes     Chronic    Pain in both knees [M25.561, M25.562] 04/04/2017 Yes    White matter abnormality on MRI of brain [R93.0] 03/02/2016 Yes    Generalized weakness [R53.1] 03/01/2016 Yes      Problems Resolved During this Admission:    Diagnosis Date Noted Date Resolved POA    Progressive neurologic decline [R29.818] 03/02/2016 04/05/2017 Yes      Discharged Condition: fair    Disposition: Home or Self CareHome    Follow Up:    Patient Instructions:     Ambulatory Referral to Neurology   Referral Priority: Routine Referral Type: Consultation   Referral Reason: Specialty Services Required    Referred to Provider: KORINA MILLER  RIK Requested Specialty: Neurology   Number of Visits Requested: 1      Ambulatory Referral to Internal Medicine   Referral Priority: Routine Referral Type: Consultation   Referral Reason: Specialty Services Required    Referred to Provider: [facility] - INTERNAL MEDICINE Requested Specialty: Internal Medicine   Number of Visits Requested: 1      Ambulatory Referral to Genetics   Referral Priority: Routine Referral Type: Consultation   Referral Reason: Specialty Services Required    Referred to Provider: KEILA OLMEDO Requested Specialty: Genetics   Number of Visits Requested: 1      Ambulatory Referral to Dermatology   Referral Priority: Routine Referral Type: Consultation   Referral Reason: Specialty Services Required    Referred to Provider: [facility] Requested Specialty: Dermatology   Number of Visits Requested: 1      Ambulatory Referral to Physical/Occupational Therapy   Referral Priority: Routine Referral Type: Physical Medicine   Referral Reason: Specialty Services Required    Number of Visits Requested: 1      Ambulatory Referral to Rheumatology   Referral Priority: Routine Referral Type: Consultation   Referral Reason: Specialty Services Required    Requested Specialty: Rheumatology    Number of Visits Requested: 1      Diet general     Activity as tolerated     Call MD for:  temperature >100.4     Call MD for:  redness, tenderness, or signs of infection (pain, swelling, redness, odor or green/yellow discharge around incision site)     Call MD for:  worsening rash       Medications:  Reconciled Home Medications:   Current Discharge Medication List      START taking these medications    Details   acetaminophen (TYLENOL) 325 MG tablet Take 2 tablets (650 mg total) by mouth every 6 (six) hours.  Refills: 0      ferrous sulfate 324 mg (65 mg iron) TbEC Take 1 tablet (325 mg total) by mouth once daily.  Refills: 0      folic acid-vit B6-vit B12 2.5-25-2 mg (FOLBIC OR EQUIV) 2.5-25-2 mg  Tab Take 1 tablet by mouth once daily.  Qty: 30 tablet, Refills: 0      multivitamin (THERAGRAN) tablet Take 1 tablet by mouth once daily.      senna-docusate 8.6-50 mg (PERICOLACE) 8.6-50 mg per tablet Take 2 tablets by mouth once daily.         CONTINUE these medications which have CHANGED    Details   baclofen (LIORESAL) 20 MG tablet Take 1 tablet (20 mg total) by mouth 3 (three) times daily.  Qty: 90 tablet, Refills: 0         CONTINUE these medications which have NOT CHANGED    Details   paroxetine (PAXIL) 20 MG tablet Take 1 tablet (20 mg total) by mouth once daily.  Qty: 30 tablet, Refills: 11      trazodone (DESYREL) 50 MG tablet Take 1 tablet (50 mg total) by mouth every evening.  Qty: 30 tablet, Refills: 6               Benji Uriarte MD  Department of Hospital Medicine  [facility]

## 2017-04-05 NOTE — PROGRESS NOTES
"Progress Note  [facility] Medicine    Admit Date: 3/29/2017    SUBJECTIVE:     Follow-up For:  Leukodystrophy    HPI/Interval history (See H&P for complete P,F,SHx) :   04/04/17  completed five day course of IV Solu-Medrol.  Had persistent bilateral knee pain 7/10.  Tylenol changed standing regimen    Review of Systems: List if applicable  Pain scale:7/10  Constitutional- Positive for Weakness, Chronic blurred vision  Musculoskeletal - positive for knee pain      OBJECTIVE:     Vital Signs Range (Last 24H):  Temp:  [98 °F (36.7 °C)-99 °F (37.2 °C)]   Pulse:  [67-88]   Resp:  [17-18]   BP: (119-139)/(59-83)   SpO2:  [96 %-98 %]     I & O (Last 24H):    Intake/Output Summary (Last 24 hours) at 04/04/17 1952  Last data filed at 04/04/17 1700   Gross per 24 hour   Intake              670 ml   Output                0 ml   Net              670 ml       Estimated body mass index is 35.72 kg/(m^2) as calculated from the following:    Height as of this encounter: 5' 4" (1.626 m).    Weight as of this encounter: 94.4 kg (208 lb 1.6 oz).  Physical Exam:  General- Patient alert and oriented x3   HEENT- PERRLA, EOMI, OP clear  Neck- No JVD, Lymphadenopathy, Thyromegaly  CV- Regular rate and rhythm, No Murmur/siddhartha/rubs  Resp- Lungs CTA Bilaterally, No increased WOB  Abdomen- Non tender/non-distended, BS normoactive x4 quads, no HSM  Extrem- No cyanosis, clubbing, edema.   Skin- No rashes, lesions, ulcers  Neuro- Strength 5/5 flexors/extensors,Intact sensation to light touch grossly. Tremors noted on bilateral upper extremities on fingernose test      Medications:  Medication list was reviewed and changes noted under Assessment/Plan.      Current Facility-Administered Medications:     acetaminophen tablet 650 mg, 650 mg, Oral, Q6H, Benji Uriarte MD, 650 mg at 04/04/17 1924    albuterol-ipratropium 2.5mg-0.5mg/3mL nebulizer solution 3 mL, 3 mL, Nebulization, Q4H PRN, Donna Mcdonald, ELLEN    baclofen tablet 20 mg, 20 " mg, Oral, TID, Benji Uriarte MD, 20 mg at 04/04/17 1428    dextrose 50% injection 12.5 g, 12.5 g, Intravenous, PRN, Donna Mcdonald PA-C    dextrose 50% injection 25 g, 25 g, Intravenous, PRN, Donna Mcdonald PA-C    enoxaparin injection 40 mg, 40 mg, Subcutaneous, Daily, Raine Benito MD, 40 mg at 04/04/17 1626    folic acid-vit B6-vit B12 2.5-25-2 mg tablet 1 tablet, 1 tablet, Oral, Daily, Raine Benito MD, 1 tablet at 04/04/17 0856    glucagon (human recombinant) injection 1 mg, 1 mg, Intramuscular, PRN, Donna Mcdonald PA-C    glucose chewable tablet 16 g, 16 g, Oral, PRN, Donna Mcdonald PA-C    glucose chewable tablet 24 g, 24 g, Oral, PRN, Donna Mcdonlad PA-C    loperamide capsule 2 mg, 2 mg, Oral, PRN, Donna Mcdonald PA-C    methylPREDNISolone sodium succinate (SOLU-MEDROL) 1,000 mg in dextrose 5 % 100 mL IVPB, , Intravenous, Daily, Raine Benito MD    multivitamin tablet 1 tablet, 1 tablet, Oral, Daily, Raine Benito MD, 1 tablet at 04/04/17 0857    ondansetron disintegrating tablet 8 mg, 8 mg, Oral, Q8H PRN, Donna Mcdonald PA-C    paroxetine tablet 20 mg, 20 mg, Oral, Daily, Donna Mcdonald PA-C, 20 mg at 04/04/17 0856    polyethylene glycol packet 17 g, 17 g, Oral, TID PRN, Donna Mcdonald PA-C    promethazine tablet 25 mg, 25 mg, Oral, Q6H PRN, Donna Mcdonald PA-C    ramelteon tablet 8 mg, 8 mg, Oral, Nightly PRN, Donna Mcdonald PA-C, 8 mg at 04/02/17 0031    thiamine (B-1) 100 mg in dextrose 5 % 50 mL IVPB, 100 mg, Intravenous, Q24H, Raine Benito MD, Last Rate: 12.5 mL/hr at 04/04/17 1310, 100 mg at 04/04/17 1310    trazodone tablet 50 mg, 50 mg, Oral, QHS, Donna Mcdonald PA-C, 50 mg at 04/03/17 2146    albuterol-ipratropium 2.5mg-0.5mg/3mL, dextrose 50%, dextrose 50%, glucagon (human recombinant), glucose, glucose, loperamide, ondansetron, polyethylene glycol, promethazine,  ramelteon    Laboratory/Diagnostic Data:  Reviewed and noted in plan where applicable- Please see chart for full lab data.        Component Value Date/Time    WBC 18.64 (H) 04/04/2017 0809    WBC 13.15 (H) 04/03/2017 0458    WBC 11.96 04/02/2017 0534    HGB 9.4 (L) 04/04/2017 0809    HGB 8.2 (L) 04/03/2017 0458    HGB 8.7 (L) 04/02/2017 0534     (H) 04/04/2017 0809     (H) 04/03/2017 0458     (H) 04/02/2017 0534     04/04/2017 0809    K 3.6 04/04/2017 0809     04/04/2017 0809    CO2 26 04/04/2017 0809    BUN 8 04/04/2017 0809    CREATININE 0.6 04/04/2017 0809    CREATININE 0.6 03/30/2017 0426    CREATININE 0.7 03/29/2017 1639    GLU 88 04/04/2017 0809    MG 1.9 03/30/2017 0426    PHOS 3.1 03/30/2017 0426    ALKPHOS 86 04/04/2017 0809    ALT 37 04/04/2017 0809    AST 17 04/04/2017 0809    ALBUMIN 3.5 04/04/2017 0809    PROT 7.7 04/04/2017 0809    BILITOT 0.2 04/04/2017 0809    INR 0.9 03/15/2016 1841    INR 1.0 03/02/2016 0057         Microbiology Results (last 7 days)     ** No results found for the last 168 hours. **            Estimated Creatinine Clearance: 151.4 mL/min (based on Cr of 0.6).      Imaging Results         X-Ray Knee 1 or 2 View Bilateral (Final result) Result time:  04/04/17 08:00:14    Final result by Ramos Ocampo III, MD (04/04/17 08:00:14)    Narrative:    2 views bilateral    Right: No fracture dislocation bone distraction seen.    Left: No fracture dislocation bone obstruction seen.      Electronically signed by: RAMOS OCAMPO  Date:     04/04/17  Time:    08:00             MRA Brain without contrast (Final result) Result time:  03/30/17 21:41:57    Final result by Beto Lion MD (03/30/17 21:41:57)    Impression:      No evidence of abnormal intracranial vasculature.  ______________________________________     Electronically signed by resident: BETO LION MD  Date:     03/30/17  Time:    21:24            As the supervising and teaching physician, I  personally reviewed the images and resident's interpretation and I agree with the findings.          Electronically signed by: PAULA GARCIA MD  Date:     03/30/17  Time:    21:41     Narrative:    MRA head    03/30/17 20:05:16    Accession# [id number]    CLINICAL INDICATION: 31 year old F with Cerebral leukodystrophy of unknown etiology, with worsened weakness,     TECHNIQUE: 3-D time-of-flight noncontrast MR angiogram of the intracranial vasculature.  Multiple MIP reconstructions were performed.    COMPARISON: MRI brain 3/29/2017.    FINDINGS:  The visualized ICAs are normal in caliber.  The vertebrobasilar system appears within normal limits. The ACAs, MCAs and PCAs demonstrate no evidence of  high-grade stenosis, focal occlusion or intracranial aneurysm.  There are bilateral posterior communicating arteries.            MRI Brain W WO Contrast (Final result) Result time:  03/29/17 21:05:12    Final result by Bteo Lion MD (03/29/17 21:05:12)    Impression:      Slight interval progression of age advanced generalized cerebral volume loss and abnormal T2/FLAIR hyperintensities throughout the supratentorial white matter with more prominent involvement of the splenium and left periatrial white matter on today's examination.  Although nonspecific, these findings would be in keeping with patient's clinical diagnosis of underlying leukodystrophy.  Short-term followup is suggested.    No evidence of abnormal enhancement.    No acute infarct or hemorrhage.  No hydrocephalus.        ______________________________________     Electronically signed by resident: BETO LION MD  Date:     03/29/17  Time:    20:23            As the supervising and teaching physician, I personally reviewed the images and resident's interpretation and I agree with the findings.          Electronically signed by: PAULA GARCIA MD  Date:     03/29/17  Time:    21:05     Narrative:    MRI brain with contrast    03/29/17 19:37:36    Accession#  "[id number]    CLINICAL INDICATION: 31 year old F with history of progressive lower extremities weakness     TECHNIQUE: Multiplanar multisequence MR imaging of the brain was performed before and after the administration of 10 cc of Gadavistintravenous contrast.     COMPARISON:  MRI 3/8/2016.    FINDINGS:  The ventricles are stable in size without evidence of hydrocephalus.  There is redemonstration of age advanced generalized cerebral volume loss with increase sulcation slightly increased compared to the prior examination from 3/8/2016.  There is redemonstration of scattered abnormal T2/FLAIR hyperintensities within the supratentorial white matter bilaterally with more confluent area of involvement within the left periatrial region.  There is also involvement of the splenium of the corpus callosum with infratentorial extension to the dentate nuclei via the middle cerebellar peduncles.  These findings have overall slightly progressed.    No evidence of acute infarct or hemorrhage.  No abnormal extra-axial blood or fluid collections.  There is no abnormal parenchymal or leptomeningeal enhancement.    No midline shift or mass effect.  The major skull base T2 flow voids are present.  The visualized paranasal sinuses and mastoid air cells are clear.  Osseous marrow signal is within normal limits.  Structures within the sella and suprasellar region are within normal limits.  The basilar cisterns are patent.              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 Not able obtain genetic testing due to complications with insurance. Her current presentation of "weakness" is more likely due to incoordination as seen on exam, and is consistent with the new lesions seen in her cerebellum.  Completed five day course of steroid. Thiamine level normal  Leukocytosis 18 - likely secondary " to steroids.  No fever.  Monitor  Yes     Chronic    Pain in both knees [M25.561, M25.562]Chronic with intermittent worsening.  X-ray of the knees - .  No evidence of fracture.  Tylenol scheduled. NATALIYA, double-stranded DNA antibody, RF, CCP Ab, uric acid negative  Yes    Progressive neurologic decline [R29.818]Able to tolerate PT/OT evaluation.  Requires outpatient PT  Yes    White matter abnormality on MRI of brain [R93.0]As above  Anemia -Likely menorrhagia. microcytic MCV 70. oral iron .with PRN stool softeners upon Discharge  Yes    Generalized weakness [R53.1]Improving  Needs follow-up at [facility] neuro (Dr Soni), [facility] genetics (Dr Kaleb Schreiber), establish PCP in [facility] network. Derm clinic referral For UE with desquamation and pigmentation changes  Yes      Resolved Hospital Problems    Diagnosis Date Resolved POA   No resolved problems to display.         Disposition- Home likely in AM    DVT prophylaxis addressed with: Subcutaneous enoxaparin            Benji Uriarte MD  Attending Staff Physician  [facility]  pager- [phone]  [employer] - [phone]

## 2017-04-06 NOTE — PROGRESS NOTES
Patient's family at bedside to  patient. Patient will ambulate off unit. Will continue to monitor.

## 2017-04-06 NOTE — PLAN OF CARE
4/6/17 0800    Patient's face sheet, discharge summary, & ambulatory referral for PT/OT faxed to [place] [facility] ([phone]).

## 2017-04-07 NOTE — PLAN OF CARE
4/7/17 0800    Ambulatory referrals for neuro, internal medicine, genetics, dermatology, & rheumatology faxed to [facility] ([phone]) per patient's request.

## 2017-04-21 ENCOUNTER — TELEPHONE (OUTPATIENT)
Dept: NEUROLOGY | Facility: CLINIC | Age: 32
End: 2017-04-21

## 2017-04-21 NOTE — TELEPHONE ENCOUNTER
----- Message from Dominique Herrera sent at 4/20/2017  4:56 PM CDT -----  Contact: self  Richard is calling to find out if the pharmacist has contacted Dr. Du about her medication.    Please contact Richard at [phone]    Thank you

## 2017-04-21 NOTE — TELEPHONE ENCOUNTER
----- Message from Rosey Cantrell sent at 4/21/2017  1:40 PM CDT -----  Contact: self @ [phone]  Pt says she is returning bernadine's call.

## 2017-05-01 ENCOUNTER — TELEPHONE (OUTPATIENT)
Dept: NEUROLOGY | Facility: CLINIC | Age: 32
End: 2017-05-01

## 2017-05-26 ENCOUNTER — OFFICE VISIT (OUTPATIENT)
Dept: NEUROLOGY | Facility: CLINIC | Age: 32
End: 2017-05-26
Payer: MEDICAID

## 2017-05-26 VITALS
HEART RATE: 125 BPM | HEIGHT: 64 IN | BODY MASS INDEX: 35.71 KG/M2 | WEIGHT: 209.19 LBS | SYSTOLIC BLOOD PRESSURE: 152 MMHG | DIASTOLIC BLOOD PRESSURE: 95 MMHG

## 2017-05-26 DIAGNOSIS — G31.80 LEUKODYSTROPHY: Primary | Chronic | ICD-10-CM

## 2017-05-26 PROCEDURE — 99213 OFFICE O/P EST LOW 20 MIN: CPT | Mod: S$PBB,,, | Performed by: PSYCHIATRY & NEUROLOGY

## 2017-05-26 PROCEDURE — 99214 OFFICE O/P EST MOD 30 MIN: CPT | Mod: PBBFAC | Performed by: PSYCHIATRY & NEUROLOGY

## 2017-05-26 PROCEDURE — 99999 PR PBB SHADOW E&M-EST. PATIENT-LVL IV: CPT | Mod: PBBFAC,,, | Performed by: PSYCHIATRY & NEUROLOGY

## 2017-05-26 RX ORDER — BACLOFEN 20 MG/1
20 TABLET ORAL 3 TIMES DAILY
Qty: 90 TABLET | Refills: 5 | Status: SHIPPED | OUTPATIENT
Start: 2017-05-26 | End: 2017-09-25 | Stop reason: SDUPTHER

## 2017-05-26 RX ORDER — PAROXETINE HYDROCHLORIDE 20 MG/1
20 TABLET, FILM COATED ORAL EVERY MORNING
Qty: 30 TABLET | Refills: 11 | Status: SHIPPED | OUTPATIENT
Start: 2017-05-26 | End: 2017-09-25 | Stop reason: SDUPTHER

## 2017-05-26 NOTE — PROGRESS NOTES
"Name: Richard Perales  MRN: [id number]   CSN: [id number]      Date: 05/26/2017    Chief Complaint / Interval History:   - Readmitted this past April for difficulty walking, received high dose steroids which helped her to feel much better   - Patient states overall she is feeling good, doing therapy  - Nothing has happened with the referrals to outside neurology, , rheumatology   - No PCP at this time but patient with number to call to get PCP   - Shaking in legs better   - Now having pain in knees     History of Present Illness (HPI):  29 yo W with 6 months progressive neuro decline, starting with vision changes in October 2015 and culminating to non-verbal, paraplegic. History per outside chart and family. No prior medical issues except for shingles last year. Complained of vision changes in October and colleagues noticed slurring of speech in December. Started having problems with gait in January and taken to hospital by family when she could not get out of bed. Seen in [place] and apparently had "extensive" neurologic and rheumatologic evaluations (records sent are slim), transferred here as neurologist had nothing more to offer."    Past Medical History: The patient  has a past medical history of Leukodystrophy.    Social History: The patient  reports that she has never smoked. She does not have any smokeless tobacco history on file. used to drink frequently, 3-4 drinks of wine coolers, no smoking or drugs.    Family History: Their family history includes Other in her other. Maternal great uncle was wheelchair bound for unknown reasons to patient. Patient is an only child. No other known neurologic disease in family.     Allergies: Review of patient's allergies indicates no known allergies.     Meds:   Current Outpatient Prescriptions on File Prior to Visit   Medication Sig Dispense Refill    acetaminophen (TYLENOL) 325 MG tablet Take 2 tablets (650 mg total) by mouth every 6 (six) hours.  0    " baclofen (LIORESAL) 20 MG tablet Take 1 tablet (20 mg total) by mouth 3 (three) times daily. 90 tablet 0    ferrous sulfate 324 mg (65 mg iron) TbEC Take 1 tablet (325 mg total) by mouth once daily.  0    folic acid-vit B6-vit B12 2.5-25-2 mg (FOLBIC OR EQUIV) 2.5-25-2 mg Tab Take 1 tablet by mouth once daily. 30 tablet 0    multivitamin (THERAGRAN) tablet Take 1 tablet by mouth once daily.      senna-docusate 8.6-50 mg (PERICOLACE) 8.6-50 mg per tablet Take 2 tablets by mouth once daily.      trazodone (DESYREL) 50 MG tablet Take 1 tablet (50 mg total) by mouth every evening. 30 tablet 6     No current facility-administered medications on file prior to visit.        Exam:  There were no vitals taken for this visit. RR 16    Constitutional  Well-developed, well-nourished, appears stated age   Cardiovascular  Radial pulses 2+ and symmetric, no LE edema bilaterally   Neurological    * Mental status       - Orientation  Oriented to person, place, time, and situation     - Memory   intact remote      - Attention/concentration  normal      - Language  Naming & repetition intac     - Fund of knowledge  Aware of current events     - Executive  Well-organized thoughts     - Other     * Cranial nerves       - CN II  PERRL, visual fields full to confrontation     - CN III, IV, VI  Extraocular movements full, hypermetric saccades, moves head to initiate saccades      - CN V  Sensation V1 - V3 intact     - CN VII  Face strong and symmetric bilaterally     - CN VIII  Hearing intact bilaterally     - CN IX, X  Palate raises midline and symmetric     - CN XI  SCM and trapezius 5/5 bilaterally     - CN XII  Tongue midline   * Motor  5/5 strength throughout    * Sensory   Intact to temperature and vibration throughout   * Coordination  Mild dysmetria on FNF and HTS    * Gait  Slow to arise, a bit spastic, wide based with truncal titubation    * Deep tendon reflexes  3+ and symmetric throughout     Laboratory/Radiological:  -  Results:  Admission on 03/29/2017, Discharged on 04/05/2017   No results displayed because visit has over 200 results.        Long Chain Fatty Acids:    Ref. Range 3/3/2016 13:01 3/3/2016 13:02   C22:0 Latest Ref Range: <=96.3 nmol/mL 59.0     C24:0 Latest Ref Range: <=91.4 nmol/mL 37.9     C26:0 Latest Ref Range: <=1.30 nmol/mL 0.25     C24:0/C22:0 Latest Ref Range: <=1.39 ratio 0.64     C26:0/C22:0 Latest Ref Range: <=0.023 ratio 0.004     Pristanic Acid Latest Ref Range: <=2.98 nmol/mL 0.04     Phytanic Acid Latest Ref Range: <=9.88 nmol/mL 0.19     Pristanic/Phytanic Latest Ref Range: <=0.39 ratio 0.21     Long Chain Fatty Acids Unknown SEE BELOW     Acetylcarnitine Latest Ref Range: 5-29 umol/L   16      Arylsulfatase A,             108           nmol/h/mg  >=62   Leukocytes   Interpretation                                                 In this sample, arylsulfatase A activity is not reduced. This result indicates that this individual is not affected with either metachromatic leukodystrophy (MLD) or multiple sulfatase deficiency. If the clinical presentation is suggestive of MLD, consider urine sulfatide analysis (MML test CTSA) which is expected to be abnormal in patients with saposin B deficiency due to mutations in the prosaposin gene.      Alpha-Galactosidase, Leukocytes   Reason for Referral        Not provided   -------------------ADDITIONAL INFORMATION-------------------   Fluorometric Enzyme Assay   Alpha-Galactosidase,       66.9          nmol/h/mg  >=23.1   Leukocytes   In this specimen, the activity of alpha-galactosidase is normal, indicating this patient is most likely NOT a carrier for Fabry disease (OMIM 424832). However, please note that enzyme analysis is, in general, not sufficiently sensitive to detect all carrier females (see Rashaad WL et al. Hum Mutat 2009; 30: 1-9; and Patricia WR et al. Mol Janae Metab. 2008; 93: 112-128). Molecular genetic analysis of the GLA gene (MML test FABRZ/Fabry  Disease, Full Gene Analysis) is the most reliable method of detecting carriers. If not already ordered, this could be done on the existing sample by calling MML within one month.      Ceramide Trihex and Sulfatide, U   In this sample, the pattern of elevated excretions of ceramide trihexoside species is suggestive of a biochemical diagnosis of Fabry disease (OMIM 679680). Please contact the Biochemical Genetics consultant or genetic counselor on call ([phone]) if you have any questions.      Other Labs: abnormal bolded  Carnitine Free 35  Carnitine Plasma 51  Pyruvic acid 0.047  HIV pcr negative  Ceruloplasmin 31  Copper, serum  Aldolase 12.8 (H)  Ammonia 34  ACE 29  Anti-thyroglobulin ab <4.0  Thiamine 28 (L)  Vitamin B6 20  Vitamin B2 3  Vitamin B3 2.31  Folate 12.8  NMO negative        OUTSIDE CSF (2/12/16):  WCB 8  CBC 32  Gluc 71  Prot 19  CMV neg  HSV1 igg 0.06  HSV2 igG 0.63  Serum hiv neg (unknown what specific test)    - Independent review of images:            MRI Brain 3/29/17:                      Diagnoses:          Leukodystrophy.    Medical Decision Making:      Problem List Items Addressed This Visit        Neuro    Leukodystrophy - Primary (Chronic)    Overview     Generalized subcortical with Positive MCP sign, Fabry's inconclusive. LP done at outside hospital with no oligoclonal band results          Current Assessment & Plan     - Check for FXTAS, NMO serum  - if wnl, bring patient back in for LP with oligoclonal bands, CSF NMO  - RTC 2-3 months or sooner if needed   -Refer to , as outside referral still pending          Relevant Orders    NMO AQUAPORIN-4-IGG, SERUM    Chromosome analysis, frag x DNA    Ambulatory consult to Genetics      Other Visit Diagnoses    None.       - refill baclofen, trazodone, paroxetine

## 2017-05-26 NOTE — ASSESSMENT & PLAN NOTE
- Check for FXTAS, NMO serum  - if wnl, bring patient back in for LP with oligoclonal bands, CSF NMO  - RTC 2-3 months or sooner if needed   -Refer to , as outside referral still pending

## 2017-05-31 ENCOUNTER — TELEPHONE (OUTPATIENT)
Dept: NEUROLOGY | Facility: CLINIC | Age: 32
End: 2017-05-31

## 2017-05-31 NOTE — TELEPHONE ENCOUNTER
Returned call to pt and she states she is having some black out moment. She was standing in kitchen fixing water and she fell. She did not realize that she fell. She was unaware of it. When she started to sit up she started to sweat. She is not sure why this is happening.

## 2017-05-31 NOTE — TELEPHONE ENCOUNTER
----- Message from Cortney Maria sent at 5/31/2017  3:45 PM CDT -----  Contact: pt  Pt states she blacked out twice for a minute or two on 06/29 th. Pt states once she awakened the only thing she noticed was the temp of her body, which was hot.Pt asks that someone call her to discuss. Pt can be reached at [phone].

## 2017-06-01 NOTE — TELEPHONE ENCOUNTER
Returned patient's call informing Dr. Mariscal is out of the office, but should return call when available. Asked patient if there was any emergency, patient stated no. Patient also expressed verbal understanding that  Was out of office.

## 2017-06-01 NOTE — TELEPHONE ENCOUNTER
Spoke to patient, blacked out twice two days ago. Instructed to go to the ER for evaluation as she still does not have a primary care physician. She is in agreement with going to the ER.

## 2017-06-01 NOTE — TELEPHONE ENCOUNTER
----- Message from Rosey Cantrell sent at 6/1/2017  2:09 PM CDT -----  Contact: self @ [phone]  Pt says she is returning dr bethea's call.

## 2017-06-09 ENCOUNTER — TELEPHONE (OUTPATIENT)
Dept: GENETICS | Facility: CLINIC | Age: 32
End: 2017-06-09

## 2017-06-12 ENCOUNTER — TELEPHONE (OUTPATIENT)
Dept: NEUROLOGY | Facility: CLINIC | Age: 32
End: 2017-06-12

## 2017-06-12 DIAGNOSIS — G31.80 LEUKODYSTROPHY: Primary | Chronic | ICD-10-CM

## 2017-06-12 NOTE — TELEPHONE ENCOUNTER
Spoke with MsCornell Diaz - no further passing out spells, she is in the process of choosing a primary care doctor. Reviewed FXTAS and serum NMO labs with her. She is in agreement with proceeding with a repeat lumbar puncture. Genetics has also contacted her. I encouraged her to call them back to schedule a consultation. Will order LP with oligoclonal bands, NMO, ACE

## 2017-06-26 ENCOUNTER — PATIENT MESSAGE (OUTPATIENT)
Dept: NEUROLOGY | Facility: CLINIC | Age: 32
End: 2017-06-26

## 2017-07-31 ENCOUNTER — OFFICE VISIT (OUTPATIENT)
Dept: GENETICS | Facility: CLINIC | Age: 32
End: 2017-07-31
Payer: MEDICAID

## 2017-07-31 VITALS — BODY MASS INDEX: 35.46 KG/M2 | HEIGHT: 64 IN | WEIGHT: 207.69 LBS

## 2017-07-31 DIAGNOSIS — G31.80 LEUKODYSTROPHY: Primary | Chronic | ICD-10-CM

## 2017-07-31 DIAGNOSIS — R90.82 WHITE MATTER ABNORMALITY ON MRI OF BRAIN: ICD-10-CM

## 2017-07-31 DIAGNOSIS — R53.1 GENERALIZED WEAKNESS: ICD-10-CM

## 2017-07-31 DIAGNOSIS — Z80.0 FAMILY HISTORY OF COLON CANCER: ICD-10-CM

## 2017-07-31 PROCEDURE — 99205 OFFICE O/P NEW HI 60 MIN: CPT | Mod: S$PBB,,, | Performed by: MEDICAL GENETICS

## 2017-07-31 PROCEDURE — 99999 PR PBB SHADOW E&M-EST. PATIENT-LVL III: CPT | Mod: PBBFAC,,, | Performed by: MEDICAL GENETICS

## 2017-07-31 PROCEDURE — 99213 OFFICE O/P EST LOW 20 MIN: CPT | Mod: PBBFAC,PO | Performed by: MEDICAL GENETICS

## 2017-07-31 NOTE — LETTER
July 31, 2017      Luis Marsical MD  [address]  [facility] [address]           [facility]  [address]  [facility] [address]  Phone: [phone]          Patient: Richard Perales   MR Number: [id number]   YOB: 1985   Date of Visit: 7/31/2017       Dear Dr. Luis Mariscal:    Thank you for referring Richard Perales to me for evaluation. Attached you will find relevant portions of my assessment and plan of care.    If you have questions, please do not hesitate to call me. I look forward to following Richard Perales along with you.    Sincerely,    Mahesh Bryan MD    Enclosure  CC:  No Recipients    If you would like to receive this communication electronically, please contact [email or web address] or [phone] to request more information on [employer] Link access.    For providers and/or their staff who would like to refer a patient to [facility], please contact us through our one-stop-shop provider referral line, [facility], at [phone].    If you feel you have received this communication in error or would no longer like to receive these types of communications, please e-mail [email or web address]

## 2017-07-31 NOTE — PROGRESS NOTES
Richard Perales  DOS: 17  : 85  MRN: [id number]    REFERRING MD: Luis Loyola  REASON FOR CONSULT: Our [facility] Genetic Service was asked to evaluate this 31-year-old  female for a possible genetic etiology of her abnormal MRI showing possible leukodystrophy. She presents unaccompanied.    PRESENT ILLNESS: Ms. Perales was in a usual state of health until Aug 2015 when she developed blurred vision, leg weakness and numbness without identifiable trigger. Her brain MRI showed diffuse cerebral atrophy and white matter changes suggestive of leukodystrophy. She has been getting PT for weakness and speech for dysathria. Shes gotten better on steroids. Shes improving clinically and subjectively, rather than getting worse. Shes going to have a spinal tap for MS workup soon.    PAST MEDICAL HISTORY: Leukodystrophy, leg pain, RTA, Generalized weakness, Depression, sleep problems.    MEDICATIONS: acetaminophen (TYLENOL) 325 MG tablet     baclofen (LIORESAL) 20 MG tablet    multivitamin (THERAGRAN) tablet    paroxetine (PAXIL) 20 MG tablet    trazodone (DESYREL) 50 MG tablet     ALLERGIES: NKDA.    DEVELOPMENTAL HISTORY: normal.     FAMILY HISTORY: no one in the family has experiences problems seen in Ms. Perales. She has no children or siblings. The father  from colon cancer at 39 and therere other cancers on his side (Ive recommended to see our counselor for cancer counseling). Consanguinity was denied    PHYSICAL EXAM: Wt: 207 lbs, Ht: 54, BMI: 35  HEENT:  Normocephalic with no specific dysmorphic features.  NECK: Supple.   CHEST: Regularly formed.  HEART: Regular rate and rhythm.    ABDOMEN: Soft, nontender, nondistended. No organomegaly.   MUSCULOSKELETAL: No dysmorphic features in the hands or feet.   NEUROLOGIC: PERRLA, EOMI, strength 5/5, DTRs 3+, slightly dysarthric speech, normal cognition.                                                                                IMPRESSION:  At this time, we discussed that Ms. Perales may have a genetic cause of leukodystrophy but MS is more likely given her age, lack of family history, MRI findings, improvement on steroids, waxing and waning and symptomatic improvement rather than worsening. We can certainly do a gene panel of genetic disorders that could give leukodystrophy. However, since her MS workup is pending, Id rather await the results and if negative, reassess for genetic testing. She agreed to this plan.    Heriberto recommended to see our genetic counselor for to assess her risks for cancer given her fathers early onset CRC and other cancers on his side.    RECOMMENDATIONS:                                                             1 Await MS workup.  2 If negative, consider gene panel for conditions implicated in leukodystrophies.  3 Cancer counseling.    Time spent: 60 minutes, more than 50% was spent in counseling. The note is in epic.     Mahesh Bryan M.D.                                                                 Section Head - Medical Genetics                                                    [facility]

## 2017-08-29 ENCOUNTER — OFFICE VISIT (OUTPATIENT)
Dept: NEUROLOGY | Facility: CLINIC | Age: 32
End: 2017-08-29
Payer: MEDICAID

## 2017-08-29 VITALS
HEIGHT: 64 IN | DIASTOLIC BLOOD PRESSURE: 93 MMHG | WEIGHT: 209.44 LBS | SYSTOLIC BLOOD PRESSURE: 135 MMHG | BODY MASS INDEX: 35.76 KG/M2 | HEART RATE: 87 BPM

## 2017-08-29 DIAGNOSIS — G31.80 LEUKODYSTROPHY: Primary | ICD-10-CM

## 2017-08-29 PROCEDURE — 99214 OFFICE O/P EST MOD 30 MIN: CPT | Mod: S$PBB,,, | Performed by: PSYCHIATRY & NEUROLOGY

## 2017-08-29 PROCEDURE — 3008F BODY MASS INDEX DOCD: CPT | Mod: ,,, | Performed by: PSYCHIATRY & NEUROLOGY

## 2017-08-29 PROCEDURE — 99212 OFFICE O/P EST SF 10 MIN: CPT | Mod: PBBFAC | Performed by: PSYCHIATRY & NEUROLOGY

## 2017-08-29 PROCEDURE — 99999 PR PBB SHADOW E&M-EST. PATIENT-LVL II: CPT | Mod: PBBFAC,,, | Performed by: PSYCHIATRY & NEUROLOGY

## 2017-08-29 NOTE — PROGRESS NOTES
"Name: Richard Perales  MRN: [id number]   CSN: [id number]      Date: 08/29/2017    Chief Complaint / Interval History:  - has continued to improve in general  - high dose steroids in April and has continued with outpatient PT - amazing recovery  - flying to [place] today, going to visit, flying alone  - still living with mom, she is pleased with her recovery as well    From May with Dr. Hayden-Fee:   - Readmitted this past April for difficulty walking, received high dose steroids which helped her to feel much better   - Patient states overall she is feeling good, doing therapy  - Nothing has happened with the referrals to outside neurology, , rheumatology   - No PCP at this time but patient with number to call to get PCP   - Shaking in legs better   - Now having pain in knees     History of Present Illness (HPI):  31 yo W with 6 months progressive neuro decline, starting with vision changes in October 2015 and culminating to non-verbal, paraplegic. History per outside chart and family. No prior medical issues except for shingles last year. Complained of vision changes in October and colleagues noticed slurring of speech in December. Started having problems with gait in January and taken to hospital by family when she could not get out of bed. Seen in [place] and apparently had "extensive" neurologic and rheumatologic evaluations (records sent are slim), transferred here as neurologist had nothing more to offer."    Past Medical History: The patient  has a past medical history of Leukodystrophy.    Social History: The patient  reports that she has never smoked. She does not have any smokeless tobacco history on file. used to drink frequently, 3-4 drinks of wine coolers, no smoking or drugs.    Family History: Their family history includes Other in her other. Maternal great uncle was wheelchair bound for unknown reasons to patient. Patient is an only child. No other known neurologic disease in family. " "    Allergies: Review of patient's allergies indicates no known allergies.     Meds:   Current Outpatient Prescriptions on File Prior to Visit   Medication Sig Dispense Refill    acetaminophen (TYLENOL) 325 MG tablet Take 2 tablets (650 mg total) by mouth every 6 (six) hours.  0    baclofen (LIORESAL) 20 MG tablet Take 1 tablet (20 mg total) by mouth 3 (three) times daily. 90 tablet 5    multivitamin (THERAGRAN) tablet Take 1 tablet by mouth once daily.      paroxetine (PAXIL) 20 MG tablet Take 1 tablet (20 mg total) by mouth every morning. 30 tablet 11    trazodone (DESYREL) 50 MG tablet Take 1 tablet (50 mg total) by mouth every evening. 30 tablet 6     No current facility-administered medications on file prior to visit.        Exam:  BP (!) 135/93   Pulse 87   Ht 5' 4" (1.626 m)   Wt 95 kg (209 lb 7 oz)   BMI 35.95 kg/m²  RR 16    Constitutional  Well-developed, well-nourished, appears stated age   Cardiovascular  Radial pulses 2+ and symmetric, no LE edema bilaterally   Neurological    * Mental status       - Orientation  Oriented to person, place, time, and situation     - Memory   intact remote, told great story about [place] and going to [facility] ("don't drink the water!")     - Attention/concentration  normal      - Language  Naming & repetition intac     - Fund of knowledge  Aware of current events     - Executive  Well-organized thoughts     - Other     * Cranial nerves       - CN II  PERRL, visual fields full to confrontation     - CN III, IV, VI  Extraocular movements full, subtle hypermetric saccades, NOT MOVING HEAD TO INITIATE     - CN V  Sensation V1 - V3 intact     - CN VII  Face strong and symmetric bilaterally     - CN VIII  Hearing intact bilaterally     - CN IX, X  Palate raises midline and symmetric     - CN XI  SCM and trapezius 5/5 bilaterally     - CN XII  Tongue midline   * Motor  5/5 strength throughout    * Sensory   Intact to temperature and vibration throughout   * " Coordination  NO dysmetria on FNF and HTS - HOLDING HER [employer] DRINK!   * Gait  Slow to arise, a bit spastic AND wide based   * Deep tendon reflexes  3+ and symmetric throughout - STABLE     Laboratory/Radiological:  - Results:  No visits with results within 3 Month(s) from this visit.   Latest known visit with results is:   Lab Visit on 05/26/2017   Component Date Value Ref Range Status    NMO/AQP4 FACS,S 06/01/2017 Negative  Negative Final    Fragile X Molecular Analysis Resul* 06/02/2017 NEGATIVE   Final    Fragile X Result 06/02/2017 SEE BELOW   Final    Fragile X DNA 06/02/2017 SEE BELOW   Final    Fragile X, Reason for Referral 06/02/2017 SEE BELOW   Final    Fragile X Specimen 06/02/2017 WB Whole Blood   Final    Fragile X, Source 06/02/2017 Test Not Performed   Final    Fragile X Method 06/02/2017 SEE BELOW   Final    Fragile X Molecular Analysis Relea* 06/02/2017 Meche Leach M.D.   Final     Long Chain Fatty Acids:    Ref. Range 3/3/2016 13:01 3/3/2016 13:02   C22:0 Latest Ref Range: <=96.3 nmol/mL 59.0     C24:0 Latest Ref Range: <=91.4 nmol/mL 37.9     C26:0 Latest Ref Range: <=1.30 nmol/mL 0.25     C24:0/C22:0 Latest Ref Range: <=1.39 ratio 0.64     C26:0/C22:0 Latest Ref Range: <=0.023 ratio 0.004     Pristanic Acid Latest Ref Range: <=2.98 nmol/mL 0.04     Phytanic Acid Latest Ref Range: <=9.88 nmol/mL 0.19     Pristanic/Phytanic Latest Ref Range: <=0.39 ratio 0.21     Long Chain Fatty Acids Unknown SEE BELOW     Acetylcarnitine Latest Ref Range: 5-29 umol/L   16      Arylsulfatase A,             108           nmol/h/mg  >=62   Leukocytes   Interpretation                                                 In this sample, arylsulfatase A activity is not reduced. This result indicates that this individual is not affected with either metachromatic leukodystrophy (MLD) or multiple sulfatase deficiency. If the clinical presentation is suggestive of MLD, consider urine sulfatide  analysis (MML test CTSA) which is expected to be abnormal in patients with saposin B deficiency due to mutations in the prosaposin gene.      Alpha-Galactosidase, Leukocytes   Reason for Referral        Not provided   -------------------ADDITIONAL INFORMATION-------------------   Fluorometric Enzyme Assay   Alpha-Galactosidase,       66.9          nmol/h/mg  >=23.1   Leukocytes   In this specimen, the activity of alpha-galactosidase is normal, indicating this patient is most likely NOT a carrier for Fabry disease (OMIM [id number]). However, please note that enzyme analysis is, in general, not sufficiently sensitive to detect all carrier females (see Hwu WL et al. Hum Mutat 2009; 30: 1-9; and Patricia WR et al. Mol Janae Metab. 2008; 93: 112-128). Molecular genetic analysis of the GLA gene (MML test FABRZ/Fabry Disease, Full Gene Analysis) is the most reliable method of detecting carriers. If not already ordered, this could be done on the existing sample by calling MML within one month.      Ceramide Trihex and Sulfatide, U   In this sample, the pattern of elevated excretions of ceramide trihexoside species is suggestive of a biochemical diagnosis of Fabry disease (OMIM 070230). Please contact the Biochemical Genetics consultant or genetic counselor on call ([phone]) if you have any questions.      Other Labs: abnormal bolded  Carnitine Free 35  Carnitine Plasma 51  Pyruvic acid 0.047  HIV pcr negative  Ceruloplasmin 31  Copper, serum  Aldolase 12.8 (H)  Ammonia 34  ACE 29  Anti-thyroglobulin ab <4.0  Thiamine 28 (L)  Vitamin B6 20  Vitamin B2 3  Vitamin B3 2.31  Folate 12.8  NMO negative        OUTSIDE CSF (2/12/16):  WCB 8  CBC 32  Gluc 71  Prot 19  CMV neg  HSV1 igg 0.06  HSV2 igG 0.63  Serum hiv neg (unknown what specific test)    - Independent review of images:            MRI Brain 3/29/17:                  Diagnoses:          Leukodystrophy - has been steroid responsive and now holding on no  immunosuppressants.    Could this have been a CLIPPERS (chronic lymphocytic inflammation with pontine perivascular enhancement responsive to steroids) - never very enhancing...    Medical Decision Making:    - refill baclofen, trazodone, paroxetine   - repeat full MRI of the brain, cervical, thoracic spine with and without  - repeat LP with MS panel  - all tests in October

## 2017-09-02 ENCOUNTER — PATIENT MESSAGE (OUTPATIENT)
Dept: NEUROLOGY | Facility: CLINIC | Age: 32
End: 2017-09-02

## 2017-09-05 ENCOUNTER — PATIENT MESSAGE (OUTPATIENT)
Dept: NEUROLOGY | Facility: CLINIC | Age: 32
End: 2017-09-05

## 2017-09-23 ENCOUNTER — PATIENT MESSAGE (OUTPATIENT)
Dept: NEUROLOGY | Facility: CLINIC | Age: 32
End: 2017-09-23

## 2017-09-25 RX ORDER — PAROXETINE HYDROCHLORIDE 20 MG/1
20 TABLET, FILM COATED ORAL EVERY MORNING
Qty: 30 TABLET | Refills: 11 | Status: SHIPPED | OUTPATIENT
Start: 2017-09-25 | End: 2017-09-28 | Stop reason: SDUPTHER

## 2017-09-25 RX ORDER — BACLOFEN 20 MG/1
20 TABLET ORAL 3 TIMES DAILY
Qty: 90 TABLET | Refills: 5 | Status: SHIPPED | OUTPATIENT
Start: 2017-09-25 | End: 2017-09-28 | Stop reason: SDUPTHER

## 2017-09-25 RX ORDER — TRAZODONE HYDROCHLORIDE 50 MG/1
50 TABLET ORAL NIGHTLY
Qty: 30 TABLET | Refills: 6 | Status: SHIPPED | OUTPATIENT
Start: 2017-09-25 | End: 2017-09-28 | Stop reason: SDUPTHER

## 2017-09-28 ENCOUNTER — TELEPHONE (OUTPATIENT)
Dept: NEUROLOGY | Facility: CLINIC | Age: 32
End: 2017-09-28

## 2017-09-28 NOTE — TELEPHONE ENCOUNTER
Advised pt that the scripts were filled electronically by Dr. Du on 9/25/17. There appears to have been a discrepancy over which pharmacy she was filling at previously but I provided her with the [facility] contact # listed in her chart and asked her to contact them to see if the meds are available for pick-up.

## 2017-09-28 NOTE — TELEPHONE ENCOUNTER
----- Message from Tiny Galvan sent at 9/28/2017  7:21 AM CDT -----  Contact: pt  Pt called in said she need the nurse to call her said she need her meds

## 2017-10-01 RX ORDER — TRAZODONE HYDROCHLORIDE 50 MG/1
50 TABLET ORAL NIGHTLY
Qty: 30 TABLET | Refills: 6 | Status: SHIPPED | OUTPATIENT
Start: 2017-10-01 | End: 2018-04-12 | Stop reason: SDUPTHER

## 2017-10-01 RX ORDER — BACLOFEN 20 MG/1
20 TABLET ORAL 3 TIMES DAILY
Qty: 90 TABLET | Refills: 5 | Status: SHIPPED | OUTPATIENT
Start: 2017-10-01 | End: 2018-06-11 | Stop reason: SDUPTHER

## 2017-10-01 RX ORDER — PAROXETINE HYDROCHLORIDE 20 MG/1
20 TABLET, FILM COATED ORAL EVERY MORNING
Qty: 30 TABLET | Refills: 11 | Status: SHIPPED | OUTPATIENT
Start: 2017-10-01 | End: 2018-06-11 | Stop reason: SDUPTHER

## 2017-10-26 ENCOUNTER — TELEPHONE (OUTPATIENT)
Dept: NEUROLOGY | Facility: CLINIC | Age: 32
End: 2017-10-26

## 2017-10-26 DIAGNOSIS — G93.49 LEUKOENCEPHALOPATHY: Primary | ICD-10-CM

## 2017-10-26 NOTE — TELEPHONE ENCOUNTER
Spoke with pt about needing to r/s her 11/2/17 appt due to Dr. Du being out of the office. She inquired about when we were going to complete her LP. I advised that a chart review shows we will be ordering the LP and contacting her to schedule accordingly.

## 2017-11-10 ENCOUNTER — HOSPITAL ENCOUNTER (OUTPATIENT)
Dept: RADIOLOGY | Facility: HOSPITAL | Age: 32
Discharge: HOME | End: 2017-11-10
Attending: PSYCHIATRY & NEUROLOGY
Payer: MEDICAID

## 2017-11-10 DIAGNOSIS — G93.49 LEUKOENCEPHALOPATHY: ICD-10-CM

## 2017-11-10 DIAGNOSIS — G31.80 LEUKODYSTROPHY: Chronic | ICD-10-CM

## 2017-11-10 LAB
CLARITY CSF: CLEAR
CLARITY CSF: CLEAR
COLOR CSF: COLORLESS
COLOR CSF: COLORLESS
GLUCOSE CSF-MCNC: 59 MG/DL
LYMPHOCYTES NFR CSF MANUAL: 80 %
LYMPHOCYTES NFR CSF MANUAL: 90 %
MONOS+MACROS NFR CSF MANUAL: 10 %
MONOS+MACROS NFR CSF MANUAL: 15 %
NEUTROPHILS NFR CSF MANUAL: 5 %
PROT CSF-MCNC: 32 MG/DL
RBC # CSF: 1 /CU MM
RBC # CSF: 7 /CU MM
SPECIMEN VOL CSF: 2 ML
SPECIMEN VOL CSF: 4.3 ML
WBC # CSF: 1 /CU MM
WBC # CSF: 3 /CU MM

## 2017-11-10 PROCEDURE — 88108 CYTOPATH CONCENTRATE TECH: CPT | Performed by: PATHOLOGY

## 2017-11-10 PROCEDURE — 88108 CYTOPATH CONCENTRATE TECH: CPT | Mod: 26,,, | Performed by: PATHOLOGY

## 2017-11-10 PROCEDURE — 82945 GLUCOSE OTHER FLUID: CPT

## 2017-11-10 PROCEDURE — 86255 FLUORESCENT ANTIBODY SCREEN: CPT

## 2017-11-10 PROCEDURE — 89051 BODY FLUID CELL COUNT: CPT

## 2017-11-10 PROCEDURE — 77003 FLUOROGUIDE FOR SPINE INJECT: CPT | Mod: 26,,, | Performed by: RADIOLOGY

## 2017-11-10 PROCEDURE — 84157 ASSAY OF PROTEIN OTHER: CPT

## 2017-11-10 PROCEDURE — 62270 DX LMBR SPI PNXR: CPT

## 2017-11-10 PROCEDURE — 82164 ANGIOTENSIN I ENZYME TEST: CPT

## 2017-11-10 PROCEDURE — 83916 OLIGOCLONAL BANDS: CPT | Mod: 91

## 2017-11-10 PROCEDURE — 62270 DX LMBR SPI PNXR: CPT | Mod: ,,, | Performed by: RADIOLOGY

## 2017-11-10 PROCEDURE — 99000 SPECIMEN HANDLING OFFICE-LAB: CPT

## 2017-11-10 NOTE — H&P
Radiology History & Physical      SUBJECTIVE:     Chief Complaint: Leukodystrophy    History of Present Illness:  Richard Perales is a 32 y.o. female who presents for a fluoroscopically guided lumbar puncture.   Past Medical History:   Diagnosis Date    Leukodystrophy      No past surgical history on file.    Home Meds:   Prior to Admission medications    Medication Sig Start Date End Date Taking? Authorizing Provider   acetaminophen (TYLENOL) 325 MG tablet Take 2 tablets (650 mg total) by mouth every 6 (six) hours. 4/5/17   Benji Uriarte MD   baclofen (LIORESAL) 20 MG tablet Take 1 tablet (20 mg total) by mouth 3 (three) times daily. 10/1/17 10/1/18  Braden Du MD   multivitamin (THERAGRAN) tablet Take 1 tablet by mouth once daily. 4/5/17   Benji Uriarte MD   paroxetine (PAXIL) 20 MG tablet Take 1 tablet (20 mg total) by mouth every morning. 10/1/17 10/1/18  Braden Du MD   trazodone (DESYREL) 50 MG tablet Take 1 tablet (50 mg total) by mouth every evening. 10/1/17 10/1/18  Braden Du MD     Anticoagulants/Antiplatelets: no anticoagulation    Allergies: Review of patient's allergies indicates:  No Known Allergies  Sedation History:  no adverse reactions    Review of Systems:   Hematological: no known coagulopathies  Respiratory: no shortness of breath  Cardiovascular: no chest pain  Gastrointestinal: no abdominal pain  Genito-Urinary: no dysuria  Musculoskeletal: negative  Neurological: no TIA or stroke symptoms         OBJECTIVE:       Physical Exam:    General: no acute distress  Mental Status: alert and oriented to person, place and time  HEENT: normocephalic, atraumatic  Chest: unlabored breathing  Abdomen: nondistended  Extremity: moves all extremities    Laboratory  Lab Results   Component Value Date    INR 0.9 03/15/2016       Lab Results   Component Value Date    WBC 18.64 (H) 04/04/2017    HGB 9.4 (L) 04/04/2017    HCT 30.0 (L) 04/04/2017    MCV 70 (L) 04/04/2017    PLT  495 (H) 04/04/2017      Lab Results   Component Value Date    GLU 88 04/04/2017     04/04/2017    K 3.6 04/04/2017     04/04/2017    CO2 26 04/04/2017    BUN 8 04/04/2017    CREATININE 0.6 04/04/2017    CALCIUM 9.2 04/04/2017    MG 1.9 03/30/2017    ALT 37 04/04/2017    AST 17 04/04/2017    ALBUMIN 3.5 04/04/2017    BILITOT 0.2 04/04/2017       ASSESSMENT/PLAN:     Sedation Plan: Local.   Patient will undergo a lumbar puncture.    Beto Saenz M.D.  Radiology, PGY-V  [phone]

## 2017-11-10 NOTE — PROCEDURES
Radiology Post-Procedure Note    Pre Op Diagnosis: Leukodystrophy     Post Op Diagnosis: Same    Procedure: lumbar puncture    Procedure performed by: Cara Saenz and Kike Rose    Written Informed Consent Obtained: Yes    Specimen Removed: 12 mL CSF    Estimated Blood Loss: Minimal    Findings: Following written informed consent and sterile prep and drape, a 22 gauge spinal needle was inserted at L3-L4 intralaminar space under fluoroscopic surveillance.  12 mL clear CSF removed and sent to the lab for further analysis.  There were no complications.    Patient tolerated procedure well.    Beto Saenz M.D.  Radiology, PGY-V  [phone]

## 2017-11-13 ENCOUNTER — TELEPHONE (OUTPATIENT)
Dept: NEUROLOGY | Facility: CLINIC | Age: 32
End: 2017-11-13

## 2017-11-13 NOTE — TELEPHONE ENCOUNTER
----- Message from Shahida Chopra sent at 11/13/2017  9:31 AM CST -----  Contact: Pt  Pt is needing to r/s her appt looking to come in the beginning of December.    Pt says she will not have a ride until then.    Pt can be reached at [phone]    Thanks

## 2017-11-14 LAB
ALBUMIN CSF-MCNC: 18.7 MG/DL
ALBUMIN SERPL-MCNC: 4330 MG/DL
IGG CSF-MCNC: 3.1 MG/DL
IGG SERPL-MCNC: 1080 MG/DL
IGG SYNTH RATE SER+CSF CALC-MRATE: 3.38 MG/24 H
IGG/ALB CLEAR SER+CSF-RTO: 0.68
IGG/ALB CSF: 0.17 {RATIO}
IGG/ALB SER: 0.25 {RATIO}
OLIGOCLONAL BANDS CSF ELPH-IMP: 2 BANDS
OLIGOCLONAL BANDS CSF ELPH-IMP: 2 BANDS
OLIGOCLONAL BANDS SERPL: 0 BANDS

## 2017-11-15 ENCOUNTER — TELEPHONE (OUTPATIENT)
Dept: NEUROLOGY | Facility: CLINIC | Age: 32
End: 2017-11-15

## 2017-11-16 LAB
ACE CSF-CCNC: 0.7 U/L (ref 0–2.5)
NMO/AQP4 FACS,CSF: NEGATIVE

## 2017-12-07 ENCOUNTER — OFFICE VISIT (OUTPATIENT)
Dept: NEUROLOGY | Facility: CLINIC | Age: 32
End: 2017-12-07
Payer: MEDICAID

## 2017-12-07 VITALS
WEIGHT: 218.5 LBS | DIASTOLIC BLOOD PRESSURE: 94 MMHG | HEART RATE: 70 BPM | SYSTOLIC BLOOD PRESSURE: 136 MMHG | BODY MASS INDEX: 37.3 KG/M2 | HEIGHT: 64 IN

## 2017-12-07 DIAGNOSIS — G31.80 LEUKODYSTROPHY: Primary | ICD-10-CM

## 2017-12-07 PROCEDURE — 99213 OFFICE O/P EST LOW 20 MIN: CPT | Mod: PBBFAC | Performed by: PSYCHIATRY & NEUROLOGY

## 2017-12-07 PROCEDURE — 99214 OFFICE O/P EST MOD 30 MIN: CPT | Mod: S$PBB,,, | Performed by: PSYCHIATRY & NEUROLOGY

## 2017-12-07 PROCEDURE — 99999 PR PBB SHADOW E&M-EST. PATIENT-LVL III: CPT | Mod: PBBFAC,,, | Performed by: PSYCHIATRY & NEUROLOGY

## 2017-12-07 NOTE — PROGRESS NOTES
"Name: Richard Perales  MRN: [id number]   CSN: [id number]      Date: 12/07/2017    Chief Complaint / Interval History:  - still living with mom  - [place] trip -   - still with mildly slurred speech, loses training of thought  - walking and balance are pretty good  - no falls  - still doing her PT exercises  - has back pain wrapping around her, not traveling into legs  - has been a bit tighter since the LP?    From August 2017:  - has continued to improve in general  - high dose steroids in April and has continued with outpatient PT - amazing recovery  - flying to [place] today, going to visit, flying alone  - still living with mom, she is pleased with her recovery as well    From May with Dr. Hayden-Fee:   - Readmitted this past April for difficulty walking, received high dose steroids which helped her to feel much better   - Patient states overall she is feeling good, doing therapy  - Nothing has happened with the referrals to outside neurology, , rheumatology   - No PCP at this time but patient with number to call to get PCP   - Shaking in legs better   - Now having pain in knees     History of Present Illness (HPI):  29 yo W with 6 months progressive neuro decline, starting with vision changes in October 2015 and culminating to non-verbal, paraplegic. History per outside chart and family. No prior medical issues except for shingles last year. Complained of vision changes in October and colleagues noticed slurring of speech in December. Started having problems with gait in January and taken to hospital by family when she could not get out of bed. Seen in [place] and apparently had "extensive" neurologic and rheumatologic evaluations (records sent are slim), transferred here as neurologist had nothing more to offer."    Past Medical History: The patient  has a past medical history of Leukodystrophy.    Social History: The patient  reports that she has never smoked. She does not " "have any smokeless tobacco history on file. used to drink frequently, 3-4 drinks of wine coolers, no smoking or drugs.    Family History: Their family history includes Other in her other. Maternal great uncle was wheelchair bound for unknown reasons to patient. Patient is an only child. No other known neurologic disease in family.     Allergies: Patient has no known allergies.     Meds:   Current Outpatient Prescriptions on File Prior to Visit   Medication Sig Dispense Refill    acetaminophen (TYLENOL) 325 MG tablet Take 2 tablets (650 mg total) by mouth every 6 (six) hours.  0    baclofen (LIORESAL) 20 MG tablet Take 1 tablet (20 mg total) by mouth 3 (three) times daily. 90 tablet 5    multivitamin (THERAGRAN) tablet Take 1 tablet by mouth once daily.      paroxetine (PAXIL) 20 MG tablet Take 1 tablet (20 mg total) by mouth every morning. 30 tablet 11    trazodone (DESYREL) 50 MG tablet Take 1 tablet (50 mg total) by mouth every evening. 30 tablet 6     No current facility-administered medications on file prior to visit.        Exam:  BP (!) 136/94   Pulse 70   Ht 5' 4" (1.626 m)   Wt 99.1 kg (218 lb 7.6 oz)   BMI 37.50 kg/m²  RR 16    Constitutional  Well-developed, well-nourished, appears stated age   Cardiovascular  Radial pulses 2+ and symmetric, no LE edema bilaterally   Neurological    * Mental status       - Orientation  Oriented to person, place, time, and situation     - Memory   intact remote, told great story about [place] and going to [facility] ("don't drink the water!")     - Attention/concentration  normal      - Language  Naming & repetition intac     - Fund of knowledge  Aware of current events     - Executive  Well-organized thoughts     - Other     * Cranial nerves       - CN II  PERRL, visual fields full to confrontation     - CN III, IV, VI  Extraocular movements full, subtle hypermetric saccades, NOT MOVING HEAD TO INITIATE     - CN V  Sensation V1 - V3 intact     - CN VII  Face strong " and symmetric bilaterally     - CN VIII  Hearing intact bilaterally     - CN IX, X  Palate raises midline and symmetric     - CN XI  SCM and trapezius 5/5 bilaterally     - CN XII  Tongue midline   * Motor  5/5 strength throughout    * Sensory   Intact to temperature and vibration throughout   * Coordination  NO dysmetria on FNF and HTS - HOLDING HER [employer] DRINK!   * Gait  Slow to arise, a bit spastic AND wide based   * Deep tendon reflexes  3+ and symmetric throughout - STABLE     Laboratory/Radiological:  - Results:  Hospital Outpatient Visit on 11/10/2017   Component Date Value Ref Range Status    CSF Bands 11/14/2017 2  bands Final    Olig Bands Interpretation, CSF 11/14/2017 2  <4 bands Final    Serum Bands 11/14/2017 0  bands Final    IgG Index, CSF 11/14/2017 0.68  <=0.85 Final    IgG, CSF 11/14/2017 3.1  <=8.1 mg/dL Final    Albumin, CSF 11/14/2017 18.7  <=27.0 mg/dL Final    IgG/Albumin Ratio, CSF 11/14/2017 0.17  <=0.21 Final    IgG Synthetic Rate 11/14/2017 3.38  <=12 mg/24 h Final    IgG,S 11/14/2017 1080  767 - 1590 mg/dL Final    Albumin, Serum 11/14/2017 4330  3200 - 4800 mg/dL Final    IgG/Albumin, S 11/14/2017 0.25  <=0.40 Final    Angio Convert Enzyme, CSF 11/16/2017 0.7  0.0 - 2.5 U/L Final    Heme Aliquot 11/10/2017 2.0  mL Final    Appearance, CSF 11/10/2017 Clear  Clear Final    Color, CSF 11/10/2017 Colorless  Colorless Final    WBC, CSF 11/10/2017 3  0 - 5 /cu mm Final    RBC, CSF 11/10/2017 7* 0 /cu mm Final    Segmented Neutrophils, CSF 11/10/2017 5  0 - 6 % Final    Lymphs, CSF 11/10/2017 80  40 - 80 % Final    Mono/Macrophage, CSF 11/10/2017 15  15 - 45 % Final    Heme Aliquot 11/10/2017 4.3  mL Final    Appearance, CSF 11/10/2017 Clear  Clear Final    Color, CSF 11/10/2017 Colorless  Colorless Final    WBC, CSF 11/10/2017 1  0 - 5 /cu mm Final    RBC, CSF 11/10/2017 1* 0 /cu mm Final    Lymphs, CSF 11/10/2017 90* 40 - 80 % Final    Mono/Macrophage,  CSF 11/10/2017 10* 15 - 45 % Final    Glucose, CSF 11/10/2017 59  40 - 70 mg/dL Final    NMO/AQP4 FACS,CSF 11/16/2017 Negative  Negative Final    Protein, CSF 11/10/2017 32  15 - 40 mg/dL Final     Long Chain Fatty Acids:    Ref. Range 3/3/2016 13:01 3/3/2016 13:02   C22:0 Latest Ref Range: <=96.3 nmol/mL 59.0     C24:0 Latest Ref Range: <=91.4 nmol/mL 37.9     C26:0 Latest Ref Range: <=1.30 nmol/mL 0.25     C24:0/C22:0 Latest Ref Range: <=1.39 ratio 0.64     C26:0/C22:0 Latest Ref Range: <=0.023 ratio 0.004     Pristanic Acid Latest Ref Range: <=2.98 nmol/mL 0.04     Phytanic Acid Latest Ref Range: <=9.88 nmol/mL 0.19     Pristanic/Phytanic Latest Ref Range: <=0.39 ratio 0.21     Long Chain Fatty Acids Unknown SEE BELOW     Acetylcarnitine Latest Ref Range: 5-29 umol/L   16      Arylsulfatase A,             108           nmol/h/mg  >=62   Leukocytes   Interpretation                                                 In this sample, arylsulfatase A activity is not reduced. This result indicates that this individual is not affected with either metachromatic leukodystrophy (MLD) or multiple sulfatase deficiency. If the clinical presentation is suggestive of MLD, consider urine sulfatide analysis (MML test CTSA) which is expected to be abnormal in patients with saposin B deficiency due to mutations in the prosaposin gene.      Alpha-Galactosidase, Leukocytes   Reason for Referral        Not provided   -------------------ADDITIONAL INFORMATION-------------------   Fluorometric Enzyme Assay   Alpha-Galactosidase,       66.9          nmol/h/mg  >=23.1   Leukocytes   In this specimen, the activity of alpha-galactosidase is normal, indicating this patient is most likely NOT a carrier for Fabry disease (OMIM [id number]). However, please note that enzyme analysis is, in general, not sufficiently sensitive to detect all carrier females (see Rashaad REESE et al. Hum Mutat 2009; 30: 1-9; and Patricia MENDEZ et al. Mol Janae Metab. 2008; 93:  112128). Molecular genetic analysis of the GLA gene (MML test FABRZ/Fabry Disease, Full Gene Analysis) is the most reliable method of detecting carriers. If not already ordered, this could be done on the existing sample by calling MML within one month.      Ceramide Trihex and Sulfatide, U   In this sample, the pattern of elevated excretions of ceramide trihexoside species is suggestive of a biochemical diagnosis of Fabry disease (OMIM 408490). Please contact the Biochemical Genetics consultant or genetic counselor on call ([phone]) if you have any questions.      Other Labs: abnormal bolded  Carnitine Free 35  Carnitine Plasma 51  Pyruvic acid 0.047  HIV pcr negative  Ceruloplasmin 31  Copper, serum  Aldolase 12.8 (H)  Ammonia 34  ACE 29  Anti-thyroglobulin ab <4.0  Thiamine 28 (L)  Vitamin B6 20  Vitamin B2 3  Vitamin B3 2.31  Folate 12.8  NMO negative        OUTSIDE CSF (2/12/16):  WCB 8  CBC 32  Gluc 71  Prot 19  CMV neg  HSV1 igg 0.06  HSV2 igG 0.63  Serum hiv neg (unknown what specific test)    - Independent review of images:            MRI Brain 3/29/17:                  Diagnoses:          Leukodystrophy - has been steroid responsive and now holding on no immunosuppressants.    Could this have been a CLIPPERS (chronic lymphocytic inflammation with pontine perivascular enhancement responsive to steroids) - never very enhancing...    Medical Decision Making:    - refill baclofen, trazodone, paroxetine   - continue PT/OT  - will get yearly MRI  - adding on a formal NP test now for baseline

## 2018-03-09 ENCOUNTER — OFFICE VISIT (OUTPATIENT)
Dept: NEUROLOGY | Facility: CLINIC | Age: 33
End: 2018-03-09
Payer: MEDICAID

## 2018-03-09 VITALS
SYSTOLIC BLOOD PRESSURE: 129 MMHG | BODY MASS INDEX: 37.75 KG/M2 | HEART RATE: 88 BPM | WEIGHT: 221.13 LBS | HEIGHT: 64 IN | DIASTOLIC BLOOD PRESSURE: 89 MMHG

## 2018-03-09 DIAGNOSIS — G31.80 LEUKODYSTROPHY: Primary | ICD-10-CM

## 2018-03-09 PROCEDURE — 99215 OFFICE O/P EST HI 40 MIN: CPT | Mod: S$PBB,,, | Performed by: PSYCHIATRY & NEUROLOGY

## 2018-03-09 PROCEDURE — 99213 OFFICE O/P EST LOW 20 MIN: CPT | Mod: PBBFAC | Performed by: PSYCHIATRY & NEUROLOGY

## 2018-03-09 PROCEDURE — 99999 PR PBB SHADOW E&M-EST. PATIENT-LVL III: CPT | Mod: PBBFAC,,, | Performed by: PSYCHIATRY & NEUROLOGY

## 2018-03-09 NOTE — PROGRESS NOTES
"Name: Richard Perales  MRN: [id number]   CSN: [id number]      Date: 03/09/2018    Chief Complaint / Interval History:  - fell in December, was coming up steps, legs gave out and "werent' there anymore", cut her face  - more slurred speech in general since, using a walker  - no HCT or imaging since fall --> and last MRI was one year ago  - thinking is a little spacey  - WORRIED ABOUT PROGRESSION AGAIN, MORE DISABLED    From Dec 2017:  - still living with mom  - stayed [place] trip -   - still with mildly slurred speech, loses training of thought  - walking and balance are pretty good  - no falls  - still doing her PT exercises  - has back pain wrapping around her, not traveling into legs  - has been a bit tighter since the LP?    From August 2017:  - has continued to improve in general  - high dose steroids in April and has continued with outpatient PT - amazing recovery  - flying to [place] today, going to visit, flying alone  - still living with mom, she is pleased with her recovery as well    From May with Dr. Hayden-Fee:   - Readmitted this past April for difficulty walking, received high dose steroids which helped her to feel much better   - Patient states overall she is feeling good, doing therapy  - Nothing has happened with the referrals to outside neurology, , rheumatology   - No PCP at this time but patient with number to call to get PCP   - Shaking in legs better   - Now having pain in knees     History of Present Illness (HPI):  29 yo W with 6 months progressive neuro decline, starting with vision changes in October 2015 and culminating to non-verbal, paraplegic. History per outside chart and family. No prior medical issues except for shingles last year. Complained of vision changes in October and colleagues noticed slurring of speech in December. Started having problems with gait in January and taken to hospital by family when she could not get out of bed. Seen in [place] " "and apparently had "extensive" neurologic and rheumatologic evaluations (records sent are slim), transferred here as neurologist had nothing more to offer."    Past Medical History: The patient  has a past medical history of Leukodystrophy.    Social History: The patient  reports that she has never smoked. She does not have any smokeless tobacco history on file. used to drink frequently, 3-4 drinks of wine coolers, no smoking or drugs.    Family History: Their family history includes Other in her other. Maternal great uncle was wheelchair bound for unknown reasons to patient. Patient is an only child. No other known neurologic disease in family.     Allergies: Patient has no known allergies.     Meds:   Current Outpatient Prescriptions on File Prior to Visit   Medication Sig Dispense Refill    acetaminophen (TYLENOL) 325 MG tablet Take 2 tablets (650 mg total) by mouth every 6 (six) hours.  0    baclofen (LIORESAL) 20 MG tablet Take 1 tablet (20 mg total) by mouth 3 (three) times daily. 90 tablet 5    paroxetine (PAXIL) 20 MG tablet Take 1 tablet (20 mg total) by mouth every morning. 30 tablet 11    trazodone (DESYREL) 50 MG tablet Take 1 tablet (50 mg total) by mouth every evening. 30 tablet 6    multivitamin (THERAGRAN) tablet Take 1 tablet by mouth once daily.       No current facility-administered medications on file prior to visit.        Exam:  /89   Pulse 88   Ht 5' 4" (1.626 m)   Wt 100.3 kg (221 lb 1.9 oz)   BMI 37.96 kg/m²  RR 16    Constitutional  Well-developed, well-nourished, appears stated age, + itchy scaly rash on R side of the forehead to ear   Cardiovascular  Radial pulses 2+ and symmetric, no LE edema bilaterally   Neurological    * Mental status       - Orientation  Oriented to person, place, time, and situation     - Memory   intact remote, told great story about [place] and going to [facility] ("don't drink the water!")     - Attention/concentration  normal      - Language  " Naming & repetition intac     - Fund of knowledge  Aware of current events     - Executive  Well-organized thoughts     - Other     * Cranial nerves       - CN II  PERRL, visual fields full to confrontation     - CN III, IV, VI  Extraocular movements full, still with B hypermetric saccades and some blink to initiate     - CN V  Sensation V1 - V3 intact     - CN VII  Mild R dim NLF now     - CN VIII  Hearing intact bilaterally     - CN IX, X  Palate raises midline and symmetric     - CN XI  SCM and trapezius 5/5 bilaterally     - CN XII  Tongue midline   * Motor  5/5 strength throughout    * Sensory   Intact to temperature and vibration throughout   * Coordination  NO dysmetria on FNF and HTS - but slight lack of check of limbs B   * Gait  Slow to arise, a bit spastic AND wide based   * Deep tendon reflexes  3+ and symmetric throughout - STABLE     Laboratory/Radiological:  - Results:  No visits with results within 3 Month(s) from this visit.   Latest known visit with results is:   Hospital Outpatient Visit on 11/10/2017   Component Date Value Ref Range Status    CSF Bands 11/10/2017 2  bands Final    Olig Bands Interpretation, CSF 11/10/2017 2  <4 bands Final    Serum Bands 11/10/2017 0  bands Final    IgG Index, CSF 11/10/2017 0.68  <=0.85 Final    IgG, CSF 11/10/2017 3.1  <=8.1 mg/dL Final    Albumin, CSF 11/10/2017 18.7  <=27.0 mg/dL Final    IgG/Albumin Ratio, CSF 11/10/2017 0.17  <=0.21 Final    IgG Synthetic Rate 11/10/2017 3.38  <=12 mg/24 h Final    IgG,S 11/10/2017 1080  767 - 1590 mg/dL Final    Albumin, Serum 11/10/2017 4330  3200 - 4800 mg/dL Final    IgG/Albumin, S 11/10/2017 0.25  <=0.40 Final    Angio Convert Enzyme, CSF 11/10/2017 0.7  0.0 - 2.5 U/L Final    Heme Aliquot 11/10/2017 2.0  mL Final    Appearance, CSF 11/10/2017 Clear  Clear Final    Color, CSF 11/10/2017 Colorless  Colorless Final    WBC, CSF 11/10/2017 3  0 - 5 /cu mm Final    RBC, CSF 11/10/2017 7* 0 /cu mm Final     Segmented Neutrophils, CSF 11/10/2017 5  0 - 6 % Final    Lymphs, CSF 11/10/2017 80  40 - 80 % Final    Mono/Macrophage, CSF 11/10/2017 15  15 - 45 % Final    Heme Aliquot 11/10/2017 4.3  mL Final    Appearance, CSF 11/10/2017 Clear  Clear Final    Color, CSF 11/10/2017 Colorless  Colorless Final    WBC, CSF 11/10/2017 1  0 - 5 /cu mm Final    RBC, CSF 11/10/2017 1* 0 /cu mm Final    Lymphs, CSF 11/10/2017 90* 40 - 80 % Final    Mono/Macrophage, CSF 11/10/2017 10* 15 - 45 % Final    Glucose, CSF 11/10/2017 59  40 - 70 mg/dL Final    NMO/AQP4 FACS,CSF 11/10/2017 Negative  Negative Final    Protein, CSF 11/10/2017 32  15 - 40 mg/dL Final     Long Chain Fatty Acids:    Ref. Range 3/3/2016 13:01 3/3/2016 13:02   C22:0 Latest Ref Range: <=96.3 nmol/mL 59.0     C24:0 Latest Ref Range: <=91.4 nmol/mL 37.9     C26:0 Latest Ref Range: <=1.30 nmol/mL 0.25     C24:0/C22:0 Latest Ref Range: <=1.39 ratio 0.64     C26:0/C22:0 Latest Ref Range: <=0.023 ratio 0.004     Pristanic Acid Latest Ref Range: <=2.98 nmol/mL 0.04     Phytanic Acid Latest Ref Range: <=9.88 nmol/mL 0.19     Pristanic/Phytanic Latest Ref Range: <=0.39 ratio 0.21     Long Chain Fatty Acids Unknown SEE BELOW     Acetylcarnitine Latest Ref Range: 5-29 umol/L   16      Arylsulfatase A,             108           nmol/h/mg  >=62   Leukocytes   Interpretation                                                 In this sample, arylsulfatase A activity is not reduced. This result indicates that this individual is not affected with either metachromatic leukodystrophy (MLD) or multiple sulfatase deficiency. If the clinical presentation is suggestive of MLD, consider urine sulfatide analysis (MML test CTSA) which is expected to be abnormal in patients with saposin B deficiency due to mutations in the prosaposin gene.      Alpha-Galactosidase, Leukocytes   Reason for Referral        Not provided   -------------------ADDITIONAL INFORMATION-------------------    Fluorometric Enzyme Assay   Alpha-Galactosidase,       66.9          nmol/h/mg  >=23.1   Leukocytes   In this specimen, the activity of alpha-galactosidase is normal, indicating this patient is most likely NOT a carrier for Fabry disease (OMIM [id number]). However, please note that enzyme analysis is, in general, not sufficiently sensitive to detect all carrier females (see Rashaad REESE et al. Hum Mutat 2009; 30: 1-9; and Patricia WR et al. Mol Janae Metab. 2008; 93: 112-128). Molecular genetic analysis of the GLA gene (MML test FABRZ/Fabry Disease, Full Gene Analysis) is the most reliable method of detecting carriers. If not already ordered, this could be done on the existing sample by calling MML within one month.      Ceramide Trihex and Sulfatide, U   In this sample, the pattern of elevated excretions of ceramide trihexoside species is suggestive of a biochemical diagnosis of Fabry disease (OMIM [id number]). Please contact the Biochemical Genetics consultant or genetic counselor on call ([phone]) if you have any questions.      Other Labs: abnormal bolded  Carnitine Free 35  Carnitine Plasma 51  Pyruvic acid 0.047  HIV pcr negative  Ceruloplasmin 31  Copper, serum  Aldolase 12.8 (H)  Ammonia 34  ACE 29  Anti-thyroglobulin ab <4.0  Thiamine 28 (L)  Vitamin B6 20  Vitamin B2 3  Vitamin B3 2.31  Folate 12.8  NMO negative        OUTSIDE CSF (2/12/16):  WCB 8  CBC 32  Gluc 71  Prot 19  CMV neg  HSV1 igg 0.06  HSV2 igG 0.63  Serum hiv neg (unknown what specific test)    - Independent review of images:            MRI Brain 3/29/17:                  Diagnoses:          Leukodystrophy - has been steroid responsive and has been holding on no immunosuppressants --> now with some steps backwards with more headaches, imbalance, slurred speech.  Needs reimaging.    Could this have been a CLIPPERS (chronic lymphocytic inflammation with pontine perivascular enhancement responsive to steroids) - never very enhancing...    Medical  Decision Making:    - redo MRI with and without ronn now  - may retreat with steroids if continues to gradually worsen - will see if MRI gives a clue  - FULLY DISABLED - WILL SIGN FORMS FOR HER STUDENT LOANS

## 2018-03-20 NOTE — TELEPHONE ENCOUNTER
Body Mass Index: Care Instructions  Your Care Instructions    Body mass index (BMI) can help you see if your weight is raising your risk for health problems. It uses a formula to compare how much you weigh with how tall you are. · A BMI lower than 18.5 is considered underweight. · A BMI between 18.5 and 24.9 is considered healthy. · A BMI between 25 and 29.9 is considered overweight. A BMI of 30 or higher is considered obese. If your BMI is in the normal range, it means that you have a lower risk for weight-related health problems. If your BMI is in the overweight or obese range, you may be at increased risk for weight-related health problems, such as high blood pressure, heart disease, stroke, arthritis or joint pain, and diabetes. If your BMI is in the underweight range, you may be at increased risk for health problems such as fatigue, lower protection (immunity) against illness, muscle loss, bone loss, hair loss, and hormone problems. BMI is just one measure of your risk for weight-related health problems. You may be at higher risk for health problems if you are not active, you eat an unhealthy diet, or you drink too much alcohol or use tobacco products. Follow-up care is a key part of your treatment and safety. Be sure to make and go to all appointments, and call your doctor if you are having problems. It's also a good idea to know your test results and keep a list of the medicines you take. How can you care for yourself at home? · Practice healthy eating habits. This includes eating plenty of fruits, vegetables, whole grains, lean protein, and low-fat dairy. · If your doctor recommends it, get more exercise. Walking is a good choice. Bit by bit, increase the amount you walk every day. Try for at least 30 minutes on most days of the week. · Do not smoke. Smoking can increase your risk for health problems. If you need help quitting, talk to your doctor about stop-smoking programs and medicines. Thanks.  Suppose you saw my CLIPPERS guess in the chart!  I'll check annually unless she gets worse. These can increase your chances of quitting for good. · Limit alcohol to 2 drinks a day for men and 1 drink a day for women. Too much alcohol can cause health problems. If you have a BMI higher than 25  · Your doctor may do other tests to check your risk for weight-related health problems. This may include measuring the distance around your waist. A waist measurement of more than 40 inches in men or 35 inches in women can increase the risk of weight-related health problems. · Talk with your doctor about steps you can take to stay healthy or improve your health. You may need to make lifestyle changes to lose weight and stay healthy, such as changing your diet and getting regular exercise. If you have a BMI lower than 18.5  · Your doctor may do other tests to check your risk for health problems. · Talk with your doctor about steps you can take to stay healthy or improve your health. You may need to make lifestyle changes to gain or maintain weight and stay healthy, such as getting more healthy foods in your diet and doing exercises to build muscle. Where can you learn more? Go to http://liliam-adrianna.info/. Enter S176 in the search box to learn more about \"Body Mass Index: Care Instructions. \"  Current as of: October 13, 2016  Content Version: 11.4  © 4057-1603 [employer]. Care instructions adapted under license by [employer] (which disclaims liability or warranty for this information). If you have questions about a medical condition or this instruction, always ask your healthcare professional. [address] any warranty or liability for your use of this information.

## 2018-04-12 NOTE — TELEPHONE ENCOUNTER
----- Message from Patrick Carpenter sent at 4/12/2018  4:41 PM CDT -----  Contact: Self @ [phone]  Pt states pharmacy will not fill the rest of script for trazodone (DESYREL) 50 MG tablet, and pt is asking the doctor send a new script to pharmacy below    [id number] - [place] - [address]  [address] [address]  Phone: [phone] Fax: [phone]

## 2018-04-13 RX ORDER — TRAZODONE HYDROCHLORIDE 50 MG/1
50 TABLET ORAL NIGHTLY
Qty: 30 TABLET | Refills: 6 | Status: SHIPPED | OUTPATIENT
Start: 2018-04-13 | End: 2019-09-06 | Stop reason: SDUPTHER

## 2018-06-11 DIAGNOSIS — G93.49 LEUKOENCEPHALOPATHY: Primary | ICD-10-CM

## 2018-06-11 NOTE — TELEPHONE ENCOUNTER
----- Message from Yong Kimbrough sent at 6/11/2018  1:34 PM CDT -----  Contact: Patient @ [phone]  Rx Refill/Request     Is this a Refill or New Rx:  Yes Refill   Rx Name and Strength: paroxetine (PAXIL) 20 MG tablet & baclofen (LIORESAL) 20 MG tablet  Preferred Pharmacy with phone number:     [id number] - [place] - [address]  [address]  [place] [address]  Phone: [phone] Fax: [phone]

## 2018-06-12 RX ORDER — BACLOFEN 20 MG/1
20 TABLET ORAL 3 TIMES DAILY
Qty: 90 TABLET | Refills: 5 | Status: SHIPPED | OUTPATIENT
Start: 2018-06-12 | End: 2019-08-09 | Stop reason: SDUPTHER

## 2018-06-12 RX ORDER — PAROXETINE HYDROCHLORIDE 20 MG/1
20 TABLET, FILM COATED ORAL EVERY MORNING
Qty: 30 TABLET | Refills: 11 | Status: SHIPPED | OUTPATIENT
Start: 2018-06-12 | End: 2019-08-05 | Stop reason: SDUPTHER

## 2018-06-12 NOTE — TELEPHONE ENCOUNTER
----- Message from Yong Kimbrough sent at 6/12/2018  1:40 PM CDT -----  Contact: Patient @ [phone]  Patient is requesting a return call about being admitted into the hospital and refills on the   ( PAXIL 20mg ) & ( BACLOFEN 20mg) Pls call to advise

## 2018-06-12 NOTE — TELEPHONE ENCOUNTER
Pt would like to admitted on the same day to get her steroids through the IV, pt stated that she would have to get it for three days. Refill for baclofen and paxil in needed! Sent dariel NEGRETE for call in.

## 2018-07-09 ENCOUNTER — TELEPHONE (OUTPATIENT)
Dept: NEUROLOGY | Facility: CLINIC | Age: 33
End: 2018-07-09

## 2018-07-09 NOTE — TELEPHONE ENCOUNTER
----- Message from Raquel Recio sent at 7/9/2018  8:21 AM CDT -----  Contact: pt @ [phone]  Calling to speak with someone in Dr. Du's office regarding her upcoming appt. Please call.

## 2018-07-09 NOTE — TELEPHONE ENCOUNTER
Pt wanted to know if the provider was going to admit her when she arrives for her upcoming appointment on July 11 th. in regards of getting her annual MRI and steroids.

## 2018-07-10 NOTE — TELEPHONE ENCOUNTER
----- Message from Nikko Aguirre sent at 7/10/2018  9:29 AM CDT -----  [facility]  Appointment Request From: Richard Perales    With Provider: Braden Du MD [[facility] - Neurology]    Would Accept With:Only the person I've selected    Preferred Date Range: From 7/30/2018 To 8/10/2018    Preferred Times: Any    Reason for visit: Request an Appt    Comments:  Office visit- 3 month check up

## 2018-08-14 ENCOUNTER — PATIENT MESSAGE (OUTPATIENT)
Dept: NEUROLOGY | Facility: CLINIC | Age: 33
End: 2018-08-14

## 2018-08-20 ENCOUNTER — PATIENT MESSAGE (OUTPATIENT)
Dept: NEUROLOGY | Facility: CLINIC | Age: 33
End: 2018-08-20

## 2018-08-21 ENCOUNTER — PATIENT MESSAGE (OUTPATIENT)
Dept: NEUROLOGY | Facility: CLINIC | Age: 33
End: 2018-08-21

## 2018-09-03 ENCOUNTER — PATIENT MESSAGE (OUTPATIENT)
Dept: NEUROLOGY | Facility: CLINIC | Age: 33
End: 2018-09-03

## 2018-10-04 ENCOUNTER — TELEPHONE (OUTPATIENT)
Dept: NEUROLOGY | Facility: CLINIC | Age: 33
End: 2018-10-04

## 2018-10-04 ENCOUNTER — HOSPITAL ENCOUNTER (OUTPATIENT)
Facility: HOSPITAL | Age: 33
Discharge: HOME | End: 2018-10-05
Attending: EMERGENCY MEDICINE | Admitting: HOSPITALIST
Payer: MEDICARE

## 2018-10-04 ENCOUNTER — OFFICE VISIT (OUTPATIENT)
Dept: NEUROLOGY | Facility: CLINIC | Age: 33
End: 2018-10-04
Payer: MEDICARE

## 2018-10-04 VITALS — DIASTOLIC BLOOD PRESSURE: 79 MMHG | HEART RATE: 74 BPM | SYSTOLIC BLOOD PRESSURE: 113 MMHG

## 2018-10-04 DIAGNOSIS — R53.83 FATIGUE, UNSPECIFIED TYPE: ICD-10-CM

## 2018-10-04 DIAGNOSIS — G31.80 LEUKODYSTROPHY: Primary | ICD-10-CM

## 2018-10-04 DIAGNOSIS — R53.1 GENERALIZED WEAKNESS: ICD-10-CM

## 2018-10-04 DIAGNOSIS — E55.9 VITAMIN D DEFICIENCY: ICD-10-CM

## 2018-10-04 DIAGNOSIS — R53.1 WEAKNESS: ICD-10-CM

## 2018-10-04 DIAGNOSIS — R39.15 URINARY URGENCY: ICD-10-CM

## 2018-10-04 DIAGNOSIS — D50.9 IRON DEFICIENCY ANEMIA, UNSPECIFIED IRON DEFICIENCY ANEMIA TYPE: ICD-10-CM

## 2018-10-04 LAB
ALBUMIN SERPL BCP-MCNC: 3.5 G/DL
ALP SERPL-CCNC: 131 U/L
ALT SERPL W/O P-5'-P-CCNC: 12 U/L
ANION GAP SERPL CALC-SCNC: 10 MMOL/L
AST SERPL-CCNC: 15 U/L
B-HCG UR QL: NEGATIVE
BACTERIA #/AREA URNS AUTO: ABNORMAL /HPF
BASOPHILS # BLD AUTO: 0.06 K/UL
BASOPHILS NFR BLD: 0.7 %
BILIRUB SERPL-MCNC: 0.4 MG/DL
BILIRUB UR QL STRIP: NEGATIVE
BUN SERPL-MCNC: 9 MG/DL
CALCIUM SERPL-MCNC: 9.4 MG/DL
CHLORIDE SERPL-SCNC: 108 MMOL/L
CLARITY UR REFRACT.AUTO: ABNORMAL
CO2 SERPL-SCNC: 22 MMOL/L
COLOR UR AUTO: YELLOW
CREAT SERPL-MCNC: 0.7 MG/DL
CTP QC/QA: YES
DIFFERENTIAL METHOD: ABNORMAL
EOSINOPHIL # BLD AUTO: 0.1 K/UL
EOSINOPHIL NFR BLD: 1 %
ERYTHROCYTE [DISTWIDTH] IN BLOOD BY AUTOMATED COUNT: 20.2 %
EST. GFR  (AFRICAN AMERICAN): >60 ML/MIN/1.73 M^2
EST. GFR  (NON AFRICAN AMERICAN): >60 ML/MIN/1.73 M^2
GLUCOSE SERPL-MCNC: 96 MG/DL
GLUCOSE UR QL STRIP: NEGATIVE
HCT VFR BLD AUTO: 30.3 %
HGB BLD-MCNC: 8.9 G/DL
HGB UR QL STRIP: NEGATIVE
HYALINE CASTS UR QL AUTO: 5 /LPF
IMM GRANULOCYTES # BLD AUTO: 0.03 K/UL
IMM GRANULOCYTES NFR BLD AUTO: 0.3 %
KETONES UR QL STRIP: NEGATIVE
LEUKOCYTE ESTERASE UR QL STRIP: NEGATIVE
LYMPHOCYTES # BLD AUTO: 2.5 K/UL
LYMPHOCYTES NFR BLD: 26.8 %
MCH RBC QN AUTO: 20.6 PG
MCHC RBC AUTO-ENTMCNC: 29.4 G/DL
MCV RBC AUTO: 70 FL
MICROSCOPIC COMMENT: ABNORMAL
MONOCYTES # BLD AUTO: 0.7 K/UL
MONOCYTES NFR BLD: 7.4 %
NEUTROPHILS # BLD AUTO: 5.9 K/UL
NEUTROPHILS NFR BLD: 63.8 %
NITRITE UR QL STRIP: NEGATIVE
NRBC BLD-RTO: 0 /100 WBC
PH UR STRIP: 5 [PH] (ref 5–8)
PLATELET # BLD AUTO: 561 K/UL
PMV BLD AUTO: 9.4 FL
POTASSIUM SERPL-SCNC: 3.4 MMOL/L
PROT SERPL-MCNC: 7.7 G/DL
PROT UR QL STRIP: ABNORMAL
RBC # BLD AUTO: 4.31 M/UL
RBC #/AREA URNS AUTO: 8 /HPF (ref 0–4)
SODIUM SERPL-SCNC: 140 MMOL/L
SP GR UR STRIP: >=1.03 (ref 1–1.03)
SQUAMOUS #/AREA URNS AUTO: 2 /HPF
TSH SERPL DL<=0.005 MIU/L-ACNC: 1.29 UIU/ML
URN SPEC COLLECT METH UR: ABNORMAL
UROBILINOGEN UR STRIP-ACNC: NEGATIVE EU/DL
WBC # BLD AUTO: 9.23 K/UL
WBC #/AREA URNS AUTO: 1 /HPF (ref 0–5)

## 2018-10-04 PROCEDURE — 94761 N-INVAS EAR/PLS OXIMETRY MLT: CPT

## 2018-10-04 PROCEDURE — 99212 OFFICE O/P EST SF 10 MIN: CPT | Mod: PBBFAC,25 | Performed by: PSYCHIATRY & NEUROLOGY

## 2018-10-04 PROCEDURE — 93005 ELECTROCARDIOGRAM TRACING: CPT

## 2018-10-04 PROCEDURE — 99284 EMERGENCY DEPT VISIT MOD MDM: CPT | Mod: 25,27

## 2018-10-04 PROCEDURE — A9585 GADOBUTROL INJECTION: HCPCS | Performed by: EMERGENCY MEDICINE

## 2018-10-04 PROCEDURE — 99999 PR PBB SHADOW E&M-EST. PATIENT-LVL II: CPT | Mod: PBBFAC,,, | Performed by: PSYCHIATRY & NEUROLOGY

## 2018-10-04 PROCEDURE — 25000003 PHARM REV CODE 250: Performed by: HOSPITALIST

## 2018-10-04 PROCEDURE — 81025 URINE PREGNANCY TEST: CPT | Performed by: EMERGENCY MEDICINE

## 2018-10-04 PROCEDURE — G0378 HOSPITAL OBSERVATION PER HR: HCPCS

## 2018-10-04 PROCEDURE — 99285 EMERGENCY DEPT VISIT HI MDM: CPT | Mod: ,,, | Performed by: EMERGENCY MEDICINE

## 2018-10-04 PROCEDURE — 25500020 PHARM REV CODE 255: Performed by: EMERGENCY MEDICINE

## 2018-10-04 PROCEDURE — 93010 ELECTROCARDIOGRAM REPORT: CPT | Mod: ,,, | Performed by: INTERNAL MEDICINE

## 2018-10-04 PROCEDURE — 99215 OFFICE O/P EST HI 40 MIN: CPT | Mod: S$PBB,,, | Performed by: PSYCHIATRY & NEUROLOGY

## 2018-10-04 PROCEDURE — 81001 URINALYSIS AUTO W/SCOPE: CPT

## 2018-10-04 PROCEDURE — 80053 COMPREHEN METABOLIC PANEL: CPT

## 2018-10-04 PROCEDURE — 85025 COMPLETE CBC W/AUTO DIFF WBC: CPT

## 2018-10-04 PROCEDURE — 84443 ASSAY THYROID STIM HORMONE: CPT

## 2018-10-04 RX ORDER — BACLOFEN 10 MG/1
20 TABLET ORAL 3 TIMES DAILY
Status: DISCONTINUED | OUTPATIENT
Start: 2018-10-05 | End: 2018-10-05 | Stop reason: HOSPADM

## 2018-10-04 RX ORDER — FERROUS SULFATE 325(65) MG
325 TABLET, DELAYED RELEASE (ENTERIC COATED) ORAL DAILY
Status: DISCONTINUED | OUTPATIENT
Start: 2018-10-05 | End: 2018-10-05 | Stop reason: HOSPADM

## 2018-10-04 RX ORDER — PAROXETINE HYDROCHLORIDE 20 MG/1
20 TABLET, FILM COATED ORAL EVERY MORNING
Status: DISCONTINUED | OUTPATIENT
Start: 2018-10-05 | End: 2018-10-05 | Stop reason: HOSPADM

## 2018-10-04 RX ORDER — SODIUM CHLORIDE 0.9 % (FLUSH) 0.9 %
5 SYRINGE (ML) INJECTION
Status: DISCONTINUED | OUTPATIENT
Start: 2018-10-05 | End: 2018-10-05 | Stop reason: HOSPADM

## 2018-10-04 RX ORDER — RAMELTEON 8 MG/1
8 TABLET ORAL NIGHTLY PRN
Status: DISCONTINUED | OUTPATIENT
Start: 2018-10-05 | End: 2018-10-05 | Stop reason: HOSPADM

## 2018-10-04 RX ORDER — ACETAMINOPHEN 325 MG/1
650 TABLET ORAL EVERY 6 HOURS PRN
Status: DISCONTINUED | OUTPATIENT
Start: 2018-10-05 | End: 2018-10-05 | Stop reason: HOSPADM

## 2018-10-04 RX ORDER — GADOBUTROL 604.72 MG/ML
10 INJECTION INTRAVENOUS
Status: COMPLETED | OUTPATIENT
Start: 2018-10-04 | End: 2018-10-04

## 2018-10-04 RX ADMIN — GADOBUTROL 10 ML: 604.72 INJECTION INTRAVENOUS at 10:10

## 2018-10-04 RX ADMIN — POTASSIUM BICARBONATE 50 MEQ: 25 TABLET, EFFERVESCENT ORAL at 11:10

## 2018-10-04 NOTE — TELEPHONE ENCOUNTER
----- Message from Patrick SEVERO Carpenter sent at 10/4/2018 11:51 AM CDT -----  Needs Advice     Reason for call: Pt states she caught a flat coming from [place] and may not be able to make it here by 1:20 w/ Dr. Du or meet the 15 min [place] period. She's asking if she can reschedule to a later time.          Communication Preference: [phone]     Additional Information:

## 2018-10-04 NOTE — TELEPHONE ENCOUNTER
----- Message from Patrickrichelle Carpenter sent at 10/4/2018 10:35 AM CDT -----  Needs Advice    Reason for call: Pt states she caught a flat coming from [place] and may not be able to make it here by 1:20 w/ Dr. Du or meet the 15 min [place] period. She's asking if she can reschedule to a later time.        Communication Preference: [phone]    Additional Information:

## 2018-10-04 NOTE — TELEPHONE ENCOUNTER
Spoke to Dr. Du and Patrick-Phone staff and per Dr. Du patient can come, but will be seen at the end of clinic. Everyone else who is on time for their appointment will be seen first. Patrick states he will give this information to the patient.

## 2018-10-04 NOTE — PROGRESS NOTES
"Name: Richard Perales  MRN: [id number]   CSN: [id number]      Date: 10/04/2018    Chief Complaint / Interval History:   - having more "panic attacks", waking up with chest tightness  - more confused now, but has been waxing and waning since 6 months  - never got her MRI done then, she is not sure why  - more urinary frequency, malodorous for 2 weeks  - legs are more wobbly  - says her ophthalmologist described a "blood clot" behind her right eye  - more shaky hands B    From March 2018:  - fell in December, was coming up steps, legs gave out and "werent' there anymore", cut her face  - more slurred speech in general since, using a walker  - no HCT or imaging since fall --> and last MRI was one year ago  - thinking is a little spacey  - WORRIED ABOUT PROGRESSION AGAIN, MORE DISABLED    From Dec 2017:  - still living with mom  - stayed in [place] on her trip -   - still with mildly slurred speech, loses training of thought  - walking and balance are pretty good  - no falls  - still doing her PT exercises  - has back pain wrapping around her, not traveling into legs  - has been a bit tighter since the LP?    From August 2017:  - has continued to improve in general  - high dose steroids in April and has continued with outpatient PT - amazing recovery  - flying to [place] today, going to visit, flying alone  - still living with mom, she is pleased with her recovery as well    From May with Dr. Hayden-Fee:   - Readmitted this past April for difficulty walking, received high dose steroids which helped her to feel much better   - Patient states overall she is feeling good, doing therapy  - Nothing has happened with the referrals to outside neurology, , rheumatology   - No PCP at this time but patient with number to call to get PCP   - Shaking in legs better   - Now having pain in knees     History of Present Illness (HPI):  31 yo W with 6 months progressive neuro decline, starting with vision changes in " "October 2015 and culminating to non-verbal, paraplegic. History per outside chart and family. No prior medical issues except for shingles last year. Complained of vision changes in October and colleagues noticed slurring of speech in December. Started having problems with gait in January and taken to hospital by family when she could not get out of bed. Seen in [place] and apparently had "extensive" neurologic and rheumatologic evaluations (records sent are slim), transferred here as neurologist had nothing more to offer."    Past Medical History: The patient  has a past medical history of Leukodystrophy.    Social History: The patient  reports that  has never smoked. She does not have any smokeless tobacco history on file. used to drink frequently, 3-4 drinks of wine coolers, no smoking or drugs.    Family History: Their family history includes Other in her other. Maternal great uncle was wheelchair bound for unknown reasons to patient. Patient is an only child. No other known neurologic disease in family.     Allergies: Patient has no known allergies.     Meds:   Current Outpatient Medications on File Prior to Visit   Medication Sig Dispense Refill    baclofen (LIORESAL) 20 MG tablet Take 1 tablet (20 mg total) by mouth 3 (three) times daily. 90 tablet 5    multivitamin (THERAGRAN) tablet Take 1 tablet by mouth once daily.      paroxetine (PAXIL) 20 MG tablet Take 1 tablet (20 mg total) by mouth every morning. 30 tablet 11    traZODone (DESYREL) 50 MG tablet Take 1 tablet (50 mg total) by mouth every evening. 30 tablet 6    acetaminophen (TYLENOL) 325 MG tablet Take 2 tablets (650 mg total) by mouth every 6 (six) hours.  0     No current facility-administered medications on file prior to visit.        Exam:  /79   Pulse 74       Constitutional  Well-developed, well-nourished, appears stated age - moderate obesity   Cardiovascular  Radial pulses 2+ and symmetric, no LE edema bilaterally   Neurological "    * Mental status       - Orientation  Drowsier than usual, but oriented to person, place, time, and situation     - Memory   Not formally tested     - Attention/concentration  Inattentive, tangential in conversation     - Language  Naming & repetition intact, mild paraphasis errors and less prosody than usual     - Fund of knowledge  Aware of current events, but limited discussion     - Executive  Moderatly-organized thoughts     - Other     * Cranial nerves       - CN II  PERRL, visual fields full to confrontation     - CN III, IV, VI  Extraocular movements full, still with B hypermetric saccades, + overshoots the midline     - CN V  Sensation V1 - V3 intact     - CN VII  Mild R dim NLF - stable     - CN VIII  Hearing intact bilaterally     - CN IX, X  Palate raises midline and symmetric     - CN XI  SCM and trapezius 5/5 bilaterally     - CN XII  Tongue midline   * Motor  5/5 strength throughout    * Sensory   Intact to temperature and vibration throughout   * Coordination  No dysmetria on FNF and HTS   + slight lack of check of limbs B   * Gait  Slow to arise, narrow-based (knock-knees) but can tandem wlak   * Deep tendon reflexes  3+ and symmetric throughout     Laboratory/Radiological:  - Results:  No visits with results within 3 Month(s) from this visit.   Latest known visit with results is:   Hospital Outpatient Visit on 11/10/2017   Component Date Value Ref Range Status    CSF Bands 11/10/2017 2  bands Final    Olig Bands Interpretation, CSF 11/10/2017 2  <4 bands Final    Serum Bands 11/10/2017 0  bands Final    IgG Index, CSF 11/10/2017 0.68  <=0.85 Final    IgG, CSF 11/10/2017 3.1  <=8.1 mg/dL Final    Albumin, CSF 11/10/2017 18.7  <=27.0 mg/dL Final    IgG/Albumin Ratio, CSF 11/10/2017 0.17  <=0.21 Final    IgG Synthetic Rate 11/10/2017 3.38  <=12 mg/24 h Final    IgG,S 11/10/2017 1080  767 - 1590 mg/dL Final    Albumin, Serum 11/10/2017 4330  3200 - 4800 mg/dL Final    IgG/Albumin, S  11/10/2017 0.25  <=0.40 Final    Angio Convert Enzyme, CSF 11/10/2017 0.7  0.0 - 2.5 U/L Final    Heme Aliquot 11/10/2017 2.0  mL Final    Appearance, CSF 11/10/2017 Clear  Clear Final    Color, CSF 11/10/2017 Colorless  Colorless Final    WBC, CSF 11/10/2017 3  0 - 5 /cu mm Final    RBC, CSF 11/10/2017 7* 0 /cu mm Final    Segmented Neutrophils, CSF 11/10/2017 5  0 - 6 % Final    Lymphs, CSF 11/10/2017 80  40 - 80 % Final    Mono/Macrophage, CSF 11/10/2017 15  15 - 45 % Final    Heme Aliquot 11/10/2017 4.3  mL Final    Appearance, CSF 11/10/2017 Clear  Clear Final    Color, CSF 11/10/2017 Colorless  Colorless Final    WBC, CSF 11/10/2017 1  0 - 5 /cu mm Final    RBC, CSF 11/10/2017 1* 0 /cu mm Final    Lymphs, CSF 11/10/2017 90* 40 - 80 % Final    Mono/Macrophage, CSF 11/10/2017 10* 15 - 45 % Final    Glucose, CSF 11/10/2017 59  40 - 70 mg/dL Final    NMO/AQP4 FACS,CSF 11/10/2017 Negative  Negative Final    Protein, CSF 11/10/2017 32  15 - 40 mg/dL Final     Long Chain Fatty Acids:    Ref. Range 3/3/2016 13:01 3/3/2016 13:02   C22:0 Latest Ref Range: <=96.3 nmol/mL 59.0     C24:0 Latest Ref Range: <=91.4 nmol/mL 37.9     C26:0 Latest Ref Range: <=1.30 nmol/mL 0.25     C24:0/C22:0 Latest Ref Range: <=1.39 ratio 0.64     C26:0/C22:0 Latest Ref Range: <=0.023 ratio 0.004     Pristanic Acid Latest Ref Range: <=2.98 nmol/mL 0.04     Phytanic Acid Latest Ref Range: <=9.88 nmol/mL 0.19     Pristanic/Phytanic Latest Ref Range: <=0.39 ratio 0.21     Long Chain Fatty Acids Unknown SEE BELOW     Acetylcarnitine Latest Ref Range: 5-29 umol/L   16      Arylsulfatase A,             108           nmol/h/mg  >=62   Leukocytes   Interpretation                                                 In this sample, arylsulfatase A activity is not reduced. This result indicates that this individual is not affected with either metachromatic leukodystrophy (MLD) or multiple sulfatase deficiency. If the clinical presentation  is suggestive of MLD, consider urine sulfatide analysis (MML test CTSA) which is expected to be abnormal in patients with saposin B deficiency due to mutations in the prosaposin gene.      Alpha-Galactosidase, Leukocytes   Reason for Referral        Not provided   -------------------ADDITIONAL INFORMATION-------------------   Fluorometric Enzyme Assay   Alpha-Galactosidase,       66.9          nmol/h/mg  >=23.1   Leukocytes   In this specimen, the activity of alpha-galactosidase is normal, indicating this patient is most likely NOT a carrier for Fabry disease (OMIM 882599). However, please note that enzyme analysis is, in general, not sufficiently sensitive to detect all carrier females (see Johnnyu WL et al. Hum Mutat 2009; 30: 1-9; and Patricia MENDEZ et al. Mol Janae Metab. 2008; 93: 112-128). Molecular genetic analysis of the GLA gene (MML test FABRZ/Fabry Disease, Full Gene Analysis) is the most reliable method of detecting carriers. If not already ordered, this could be done on the existing sample by calling MML within one month.      Ceramide Trihex and Sulfatide, U   In this sample, the pattern of elevated excretions of ceramide trihexoside species is suggestive of a biochemical diagnosis of Fabry disease (OMIM 343555). Please contact the Biochemical Genetics consultant or genetic counselor on call ([phone] if you have any questions.      Other Labs: abnormal bolded  Carnitine Free 35  Carnitine Plasma 51  Pyruvic acid 0.047  HIV pcr negative  Ceruloplasmin 31  Copper, serum  Aldolase 12.8 (H)  Ammonia 34  ACE 29  Anti-thyroglobulin ab <4.0  Thiamine 28 (L)  Vitamin B6 20  Vitamin B2 3  Vitamin B3 2.31  Folate 12.8  NMO negative        OUTSIDE CSF (2/12/16):  WCB 8  CBC 32  Gluc 71  Prot 19  CMV neg  HSV1 igg 0.06  HSV2 igG 0.63  Serum hiv neg (unknown what specific test)    - Independent review of images:            MRI Brain 3/29/17:                  Diagnoses:          Leukodystrophy - has been steroid  responsive and has been holding on no immunosuppressants.  Could this have been a CLIPPERS (chronic lymphocytic inflammation with pontine perivascular enhancement responsive to steroids) - though never very enhancing...    She has worsened gradually over 6 months, and now more acutely over the last several weeks.  Not entirely clear on time frame.  Did not follow-up with testing after last visit.  Has symptoms that may be referable to a UTI now.  Also panic attacks that might be part of her neuropsychiatric symptoms versus some cervicothoracic lesion?    Medical Decision Making:  - to ER   - expect admit to obs  - labs for pseudo exacerbation, CBC, CMP, U/A and culture, CXR  - MRI brain, cervical & thoracic spine with and without ronn  - no steroids yet, but would treat with IV solumedrol 1000 mg x 3-5 days if lab workup is negative  - consult neurology please  - discussed with ER

## 2018-10-04 NOTE — TELEPHONE ENCOUNTER
----- Message from Raquel Recio sent at 10/4/2018 12:17 PM CDT -----  Contact: pt @ [phone]  Aasking if she can be seen later today, she has a 1:20pm appt, but had car trouble  says she know she will be late to the appt, so she is asking if she can be seen later this evening. Please call.

## 2018-10-05 VITALS
WEIGHT: 218.06 LBS | TEMPERATURE: 99 F | SYSTOLIC BLOOD PRESSURE: 132 MMHG | BODY MASS INDEX: 37.23 KG/M2 | HEIGHT: 64 IN | HEART RATE: 78 BPM | RESPIRATION RATE: 20 BRPM | OXYGEN SATURATION: 98 % | DIASTOLIC BLOOD PRESSURE: 84 MMHG

## 2018-10-05 PROBLEM — E55.9 VITAMIN D DEFICIENCY: Status: ACTIVE | Noted: 2018-10-05

## 2018-10-05 PROBLEM — D50.9 IRON DEFICIENCY ANEMIA: Status: ACTIVE | Noted: 2018-10-05

## 2018-10-05 PROBLEM — M79.89 LEFT LEG SWELLING: Status: ACTIVE | Noted: 2018-10-05

## 2018-10-05 LAB
25(OH)D3+25(OH)D2 SERPL-MCNC: 7 NG/ML
ANION GAP SERPL CALC-SCNC: 7 MMOL/L
BASOPHILS # BLD AUTO: 0.06 K/UL
BASOPHILS NFR BLD: 0.7 %
BUN SERPL-MCNC: 9 MG/DL
CALCIUM SERPL-MCNC: 8.7 MG/DL
CHLORIDE SERPL-SCNC: 109 MMOL/L
CO2 SERPL-SCNC: 25 MMOL/L
CREAT SERPL-MCNC: 0.7 MG/DL
DIFFERENTIAL METHOD: ABNORMAL
EOSINOPHIL # BLD AUTO: 0.1 K/UL
EOSINOPHIL NFR BLD: 1.6 %
ERYTHROCYTE [DISTWIDTH] IN BLOOD BY AUTOMATED COUNT: 20 %
EST. GFR  (AFRICAN AMERICAN): >60 ML/MIN/1.73 M^2
EST. GFR  (NON AFRICAN AMERICAN): >60 ML/MIN/1.73 M^2
FERRITIN SERPL-MCNC: 11 NG/ML
FOLATE SERPL-MCNC: 2.6 NG/ML
GLUCOSE SERPL-MCNC: 102 MG/DL
HCT VFR BLD AUTO: 27.4 %
HGB BLD-MCNC: 8 G/DL
IMM GRANULOCYTES # BLD AUTO: 0.04 K/UL
IMM GRANULOCYTES NFR BLD AUTO: 0.5 %
IRON SERPL-MCNC: 11 UG/DL
LYMPHOCYTES # BLD AUTO: 2.7 K/UL
LYMPHOCYTES NFR BLD: 32.8 %
MCH RBC QN AUTO: 20.7 PG
MCHC RBC AUTO-ENTMCNC: 29.2 G/DL
MCV RBC AUTO: 71 FL
MONOCYTES # BLD AUTO: 0.6 K/UL
MONOCYTES NFR BLD: 7.9 %
NEUTROPHILS # BLD AUTO: 4.6 K/UL
NEUTROPHILS NFR BLD: 56.5 %
NRBC BLD-RTO: 0 /100 WBC
PLATELET # BLD AUTO: 488 K/UL
PMV BLD AUTO: 9.4 FL
POTASSIUM SERPL-SCNC: 3.6 MMOL/L
PROCALCITONIN SERPL IA-MCNC: <0.02 NG/ML
RBC # BLD AUTO: 3.87 M/UL
SATURATED IRON: 3 %
SODIUM SERPL-SCNC: 141 MMOL/L
TOTAL IRON BINDING CAPACITY: 422 UG/DL
TRANSFERRIN SERPL-MCNC: 285 MG/DL
VIT B12 SERPL-MCNC: 770 PG/ML
WBC # BLD AUTO: 8.15 K/UL

## 2018-10-05 PROCEDURE — G8978 MOBILITY CURRENT STATUS: HCPCS | Mod: CH

## 2018-10-05 PROCEDURE — G8979 MOBILITY GOAL STATUS: HCPCS | Mod: CH

## 2018-10-05 PROCEDURE — G8989 SELF CARE D/C STATUS: HCPCS | Mod: CH

## 2018-10-05 PROCEDURE — 25000003 PHARM REV CODE 250: Performed by: HOSPITALIST

## 2018-10-05 PROCEDURE — G0378 HOSPITAL OBSERVATION PER HR: HCPCS

## 2018-10-05 PROCEDURE — 97165 OT EVAL LOW COMPLEX 30 MIN: CPT

## 2018-10-05 PROCEDURE — 97161 PT EVAL LOW COMPLEX 20 MIN: CPT

## 2018-10-05 PROCEDURE — 82306 VITAMIN D 25 HYDROXY: CPT

## 2018-10-05 PROCEDURE — 80048 BASIC METABOLIC PNL TOTAL CA: CPT

## 2018-10-05 PROCEDURE — G8980 MOBILITY D/C STATUS: HCPCS | Mod: CH

## 2018-10-05 PROCEDURE — G8987 SELF CARE CURRENT STATUS: HCPCS | Mod: CH

## 2018-10-05 PROCEDURE — 83540 ASSAY OF IRON: CPT

## 2018-10-05 PROCEDURE — 82746 ASSAY OF FOLIC ACID SERUM: CPT

## 2018-10-05 PROCEDURE — 82728 ASSAY OF FERRITIN: CPT

## 2018-10-05 PROCEDURE — 84145 PROCALCITONIN (PCT): CPT

## 2018-10-05 PROCEDURE — 36415 COLL VENOUS BLD VENIPUNCTURE: CPT

## 2018-10-05 PROCEDURE — 99214 OFFICE O/P EST MOD 30 MIN: CPT | Mod: GC,,, | Performed by: PSYCHIATRY & NEUROLOGY

## 2018-10-05 PROCEDURE — G8988 SELF CARE GOAL STATUS: HCPCS | Mod: CH

## 2018-10-05 PROCEDURE — 82607 VITAMIN B-12: CPT

## 2018-10-05 PROCEDURE — 99219 PR INITIAL OBSERVATION CARE,LEVL II: CPT | Mod: ,,, | Performed by: HOSPITALIST

## 2018-10-05 PROCEDURE — 63600175 PHARM REV CODE 636 W HCPCS: Mod: JG | Performed by: HOSPITALIST

## 2018-10-05 PROCEDURE — 85025 COMPLETE CBC W/AUTO DIFF WBC: CPT

## 2018-10-05 RX ORDER — ENOXAPARIN SODIUM 100 MG/ML
40 INJECTION SUBCUTANEOUS EVERY 24 HOURS
Status: DISCONTINUED | OUTPATIENT
Start: 2018-10-05 | End: 2018-10-05

## 2018-10-05 RX ORDER — ERGOCALCIFEROL 1.25 MG/1
50000 CAPSULE ORAL
Status: DISCONTINUED | OUTPATIENT
Start: 2018-10-05 | End: 2018-10-05 | Stop reason: HOSPADM

## 2018-10-05 RX ORDER — ERGOCALCIFEROL 1.25 MG/1
50000 CAPSULE ORAL
Qty: 7 CAPSULE | Refills: 0 | Status: SHIPPED | OUTPATIENT
Start: 2018-10-12 | End: 2018-11-24

## 2018-10-05 RX ORDER — FERROUS SULFATE 325(65) MG
325 TABLET, DELAYED RELEASE (ENTERIC COATED) ORAL DAILY
Refills: 0 | COMMUNITY
Start: 2018-10-06 | End: 2019-04-16

## 2018-10-05 RX ORDER — FOLIC ACID 0.4 MG
400 TABLET ORAL DAILY
Refills: 0 | COMMUNITY
Start: 2018-10-05 | End: 2019-04-16

## 2018-10-05 RX ADMIN — ERGOCALCIFEROL 50000 UNITS: 1.25 CAPSULE ORAL at 09:10

## 2018-10-05 RX ADMIN — IRON DEXTRAN 25 MG: 50 INJECTION INTRAMUSCULAR; INTRAVENOUS at 04:10

## 2018-10-05 RX ADMIN — THERA TABS 1 TABLET: TAB at 09:10

## 2018-10-05 RX ADMIN — BACLOFEN 20 MG: 10 TABLET ORAL at 09:10

## 2018-10-05 RX ADMIN — FERROUS SULFATE TAB EC 325 MG (65 MG FE EQUIVALENT) 325 MG: 325 (65 FE) TABLET DELAYED RESPONSE at 09:10

## 2018-10-05 RX ADMIN — DEXTROSE: 50 INJECTION, SOLUTION INTRAVENOUS at 01:10

## 2018-10-05 RX ADMIN — BACLOFEN 20 MG: 10 TABLET ORAL at 04:10

## 2018-10-05 RX ADMIN — PAROXETINE HYDROCHLORIDE HEMIHYDRATE 20 MG: 20 TABLET, FILM COATED ORAL at 09:10

## 2018-10-05 RX ADMIN — IRON DEXTRAN 975 MG: 50 INJECTION INTRAMUSCULAR; INTRAVENOUS at 05:10

## 2018-10-05 NOTE — PT/OT/SLP EVAL
"Physical Therapy Evaluation and Discharge Note    Patient Name:  Richard Perales   MRN:  [id number]    Recommendations:     Discharge Recommendations:  home, outpatient PT   Discharge Equipment Recommendations: none   Barriers to discharge: None    Assessment:     Richard Perales is a 33 y.o. female admitted with a medical diagnosis of Generalized weakness. .  At this time, patient is functioning at their prior level of function and does not require further acute PT services. Patient has some gait instability, but she is overall functional and is safe to DC to self care. May benefit from Outpatient PT followup. Patient reports that she does well following her steroid shots (which are planned). Patient will be DC from acute services.     Recent Surgery: * No surgery found *      Plan:     During this hospitalization, patient does not require further acute PT services.  Please re-consult if situation changes.      Subjective     Patient/Family Comments/goals: "I don't need a PT" (jokingly)  Pain/Comfort:  · Pain Rating 1: 0/10  · Pain Rating Post-Intervention 1: 0/10    Patients cultural, spiritual, Alevism conflicts given the current situation: n/a    Living Environment:  Patient lives w/ mother in a H, 4STE w/ BHR. Tub Shower. Patient drives but does not work.    Prior to admission, patients level of function was Independent but receives assistance from her mother PRN.  Equipment used at home: wheelchair, walker, rolling, rollator, shower chair, bedside commode(pt owns but does not currently use).  Upon discharge, patient will have assistance from mother.    Objective:     Communicated with nurse prior to session.  Patient found supine w/ hob elevated upon PT entry to room found with: telemetry, pulse ox (continuous), peripheral IV     General Precautions: Standard, fall   Orthopedic Precautions:N/A   Braces: N/A     Exams:  · Cognitive Exam:  Patient is oriented to Person, Place, Time and Situation  · Fine " Motor Coordination:    · -       Intact  · Gross Motor Coordination:  WFL  · Sensation:    · -       Intact  · RLE ROM: WFL  · RLE Strength: WFL  · LLE ROM: WFL  · LLE Strength: WFL    Functional Mobility:  · Bed Mobility:  Supine to Sit: supervision  · Sit to Supine: supervision  · Transfers:  Sit to Stand:  supervision with no AD  · Gait: 58' SBA w/ no AD    AM-PAC 6 CLICK MOBILITY  Total Score:24       Therapeutic Activities and Exercises:   PT Eval completed    AM-PAC 6 CLICK MOBILITY  Total Score:24     Patient left sitting EOB with all lines intact, call button in reach and nurse notified.    GOALS:   Multidisciplinary Problems     Physical Therapy Goals     Not on file          Multidisciplinary Problems (Resolved)        Problem: Physical Therapy Goal    Goal Priority Disciplines Outcome Goal Variances Interventions   Physical Therapy Goal   (Resolved)     PT, PT/OT Outcome(s) achieved                     History:     Past Medical History:   Diagnosis Date    Depression     Leukodystrophy     Neurologic disorder        No past surgical history on file.    Clinical Decision Making:     History  Co-morbidities and personal factors that may impact the plan of care Examination  Body Structures and Functions, activity limitations and participation restrictions that may impact the plan of care Clinical Presentation   Decision Making/ Complexity Score   Co-morbidities:   [] Time since onset of injury / illness / exacerbation  [] Status of current condition  []Patient's cognitive status and safety concerns    [] Multiple Medical Problems (see med hx)  Personal Factors:   [] Patient's age  [] Prior Level of function   [] Patient's home situation (environment and family support)  [] Patient's level of motivation  [] Expected progression of patient      HISTORY:(criteria)    [] 85611 - no personal factors/history    [] 07151 - has 1-2 personal factor/comorbidity     [] 96512 - has >3 personal factor/comorbidity      Body Regions:  [] Objective examination findings  [] Head     []  Neck  [] Trunk   [] Upper Extremity  [] Lower Extremity    Body Systems:  [] For communication ability, affect, cognition, language, and learning style: the assessment of the ability to make needs known, consciousness, orientation (person, place, and time), expected emotional /behavioral responses, and learning preferences (eg, learning barriers, education  needs)  [] For the neuromuscular system: a general assessment of gross coordinated movement (eg, balance, gait, locomotion, transfers, and transitions) and motor function  (motor control and motor learning)  [] For the musculoskeletal system: the assessment of gross symmetry, gross range of motion, gross strength, height, and weight  [] For the integumentary system: the assessment of pliability(texture), presence of scar formation, skin color, and skin integrity  [] For cardiovascular/pulmonary system: the assessment of heart rate, respiratory rate, blood pressure, and edema     Activity limitations:    [] Patient's cognitive status and saf ety concerns          [] Status of current condition      [] Weight bearing restriction  [] Cardiopulmunary Restriction    Participation Restrictions:   [] Goals and goal agreement with the patient     [] Rehab potential (prognosis) and probable outcome      Examination of Body System: (criteria)    [] 47022 - addressing 1-2 elements    [] 53653 - addressing a total of 3 or more elements     [] 08250 -  Addressing a total of 4 or more elements         Clinical Presentation: (criteria)  Choose one     On examination of body system using standardized tests and measures patient presents with (CHOOSE ONE) elements from any of the following: body structures and functions, activity limitations, and/or participation restrictions.  Leading to a clinical presentation that is considered (CHOOSE ONE)                              Clinical Decision Making  (Eval Complexity):   Low- 70176     Time Tracking:     PT Received On: 10/05/18  PT Start Time: 0854     PT Stop Time: 0915  PT Total Time (min): 21 min     Billable Minutes: Evaluation 21 Min'      Markell Dodson, PT  10/05/2018

## 2018-10-05 NOTE — PT/OT/SLP EVAL
Occupational Therapy   Evaluation/Discharge    Name: Richard Perales  MRN: [id number]  Admitting Diagnosis:  Generalized weakness      Recommendations:     Discharge Recommendations: home  Discharge Equipment Recommendations:  none  Barriers to discharge:  None    History:     Occupational Profile:  Living Environment: Pt lives with mother, 1SH with ~5 KRISHNA and B railing, bathroom contains tub-shower combo ( pt owns shower chair but does not use)  Previous level of function: PTA, pt reports occasionally using RW during functional mobility ( when feeling weak) and independent with ADL.   Roles and Routines: Daughter, drives, unemployed  Equipment Used at Home:  wheelchair, walker, rolling, rollator, shower chair, bedside commode(Pt owns but does not currently use)  Assistance upon Discharge: Pt will have assistance from family upon discharge    Past Medical History:   Diagnosis Date    Depression     Leukodystrophy     Neurologic disorder        No past surgical history on file.    Subjective     Chief Complaint: No complaints   Patient/Family Comments/goals: Return home    Pain/Comfort:  · Pain Rating 1: 0/10  · Pain Rating Post-Intervention 1: 0/10    Patients cultural, spiritual, Mosque conflicts given the current situation:  None stated    Objective:     Communicated with: RN prior to session.  Patient found all lines intact, call button in reach and RN notified and telemetry, pulse ox (continuous), peripheral IV upon OT entry to room.    General Precautions: Standard,     Orthopedic Precautions:N/A   Braces: N/A     Occupational Performance:    Bed Mobility:    · Patient completed Rolling/Turning to Left with  independence  · Patient completed Supine to Sit with independence  · Patient completed Sit to Supine with independence    Functional Mobility/Transfers:  · Patient completed Sit <> Stand Transfer with supervision  with  no assistive device   · Functional Mobility: Pt completed functional mobility in  room with supervision and no AD. She tolerated well with no LOB or SOB.     Activities of Daily Living:  · Upper Body Dressing: independence to bowen gown like jacket while standing  · Lower Body Dressing: independence to bowen B socks while seated EOB    Cognitive/Visual Perceptual:  Cognitive/Psychosocial Skills:     -       Oriented to: Person, Place, Time and Situation   -       Follows Commands/attention:Follows multistep  commands  -       Communication: clear however slow ( requires time to process and verbalize words)  -       Memory: No Deficits noted ( pt able to immediately recall 5/5 words and will 5 minute delay)  -       Safety awareness/insight to disability: intact   -       Mood/Affect/Coping skills/emotional control: Appropriate to situation  Visual/Perceptual:      -Intact     Physical Exam:  Postural examination/scapula alignment:    -       No postural abnormalities identified  Skin integrity: Visible skin intact  Edema:  None noted  Sensation:    -       Intact   Motor Planning:    -       Intact  Dominant hand:    -       RUE  Upper Extremity Range of Motion:    BUE WFL  Upper Extremity Strength:   BUE 5/5    Strength:  WFL 5/5  Fine Motor Coordination:    -       Intact finger to nose,intact opposition, intact manipulation of objects    AMPAC 6 Click ADL:  AMPAC Total Score: 24    Treatment & Education:  -Pt edu on OT role/POC  -Importance of OOB activity ( UIC during each meal, ambulate in hallways with assistance)  -Safety during functional t/f and mobility  - White board updated  - Multiple self care tasks completed-- assistance level noted above  - All questions/concerns answered within OT scope of practice    Education:    Patient left supine with all lines intact, call button in reach and RN notified    Assessment:     Richard Perales is a 33 y.o. female with a medical diagnosis of Generalized weakness. is currently performing ADLs, functional mobility & t/fs at baseline and displays  "age-appropriate strength, endurance & balance. OT services are not recommended at this time and patient is safe to D/C home.    Rehab Prognosis: Good; patient would benefit from acute skilled OT services to address these deficits and reach maximum level of function.         Clinical Decision Makin.  OT Low:  "Pt evaluation falls under low complexity for evaluation coding due to performance deficits noted in 1-3 areas as stated above and 0 co-morbities affecting current functional status. Data obtained from problem focused assessments. No modifications or assistance was required for completion of evaluation. Only brief occupational profile and history review completed."     Plan:     Patient to be seen   to address the above listed problems via    · Plan of Care Expires: 18  · Plan of Care Reviewed with: patient    This Plan of care has been discussed with the patient who was involved in its development and understands and is in agreement with the identified goals and treatment plan    GOALS:   Multidisciplinary Problems     Occupational Therapy Goals        Problem: Occupational Therapy Goal    Goal Priority Disciplines Outcome Interventions   Occupational Therapy Goal     OT, PT/OT Ongoing (interventions implemented as appropriate)    Description:  Pt is currently performing ADLs, functional mobility & t/fs at baseline and displays age-appropriate strength, endurance & balance. OT services are not recommended at this time and patient is safe to D/C home.                    Time Tracking:     OT Date of Treatment: 10/05/18  OT Start Time: 1024  OT Stop Time: 1048  OT Total Time (min): 24 min    Billable Minutes:Evaluation 24    Emily hood OT  10/5/2018    "

## 2018-10-05 NOTE — PLAN OF CARE
"CM to bedside - pt provided assessment info. Pt w/ DME in place, lives w/ mother. Pt will likely d/c home w/ no needs.     CM provided patient anticipated INDIRA which will be update by nursing staff. Patient provided a Blue "My Health Packet" for d/c planning and health tool. Patient verbalized understanding.     10/05/18 0678   Discharge Assessment   Assessment Type Discharge Planning Assessment   Confirmed/corrected address and phone number on facesheet? Yes   Assessment information obtained from? Patient   Expected Length of Stay (days) 2   Communicated expected length of stay with patient/caregiver yes   Prior to hospitilization cognitive status: Alert/Oriented   Prior to hospitalization functional status: Assistive Equipment;Needs Assistance   Current cognitive status: Alert/Oriented   Current Functional Status: Assistive Equipment;Needs Assistance   Facility Arrived From: N/A   Lives With parent(s)   Able to Return to Prior Arrangements yes   Is patient able to care for self after discharge? Yes   Who are your caregiver(s) and their phone number(s)? mother Jackelin Madsen [phone]   Patient's perception of discharge disposition home or selfcare   Readmission Within The Last 30 Days no previous admission in last 30 days   Patient currently being followed by outpatient case management? No   Patient currently receives any other outside agency services? No   Equipment Currently Used at Home walker, rolling;rollator;wheelchair;shower chair   Do you have any problems affording any of your prescribed medications? No   Is the patient taking medications as prescribed? yes   Does the patient have transportation home? Yes   Transportation Available family or friend will provide   Dialysis Name and Scheduled days N/A   Does the patient receive services at the [facility]? No   Discharge Plan A Home with family   Discharge Plan B Home with family;Home Health   Patient/Family In Agreement With Plan yes     "

## 2018-10-05 NOTE — PLAN OF CARE
Problem: Occupational Therapy Goal  Goal: Occupational Therapy Goal  Pt is currently performing ADLs, functional mobility & t/fs at baseline and displays age-appropriate strength, endurance & balance. OT services are not recommended at this time and patient is safe to D/C home.  Outcome: Ongoing (interventions implemented as appropriate)  Evaluation completed. Pt with no OT needs at this time. D/c home.

## 2018-10-05 NOTE — PLAN OF CARE
Problem: Fall Risk (Adult)  Goal: Identify Related Risk Factors and Signs and Symptoms  Related risk factors and signs and symptoms are identified upon initiation of Human Response Clinical Practice Guideline (CPG)  Outcome: Ongoing (interventions implemented as appropriate)   10/05/18 1121   Fall Risk   Related Risk Factors (Fall Risk) environment unfamiliar;polypharmacy;inadequate lighting     Fall precautions in place. No falls occurred during this shift.

## 2018-10-05 NOTE — H&P
History and Physical   Hospital Medicine     Patient Name: Richard Perales  MRN:  [id number]  [facility] Medicine Team: [facility]OSP MED O Emma Bey MD  Date of Admission:  10/4/2018     Principal Problem:  Generalized weakness   Primary Care Physician: Primary Doctor No      History of Present Illness:     Ms. Richard Perales is a 33 y.o. female with prior hx of leukodystrophy (see Dr Weinstein) and depression/ anxiety , presented to the ED on 10/4 from Neurology clinic for admission for further evaluation. Patient presented with concerns of increasing intermittent confusion,associated with balance problems, worsening panic attacks, slowed speech. Pt noteed that she typically has difficulty with focusing and memory as well as balance with her leukodystrophy. Pt was seen in neurology clinic 10/4 by Dr Weinstein who recommended admission to OBS. Pt reported malodorous urine, however, evidence of UTI on admit. No fevers, dysuria, CP, SOB, abd pain, vision changes, HA, paresthesias.     Pt Dr weinstein's clinic note, pt's leukodystrophy has been steroid responsive in the past. recommended MRI brain C and T spine on admit. On presentation, pt was stable , HDS, with labs wnl      Review of Systems   Constitutional: as per HPI  HENT: Negative for sore throat, trouble swallowing.    Eyes: Negative for photophobia, visual disturbance.   Respiratory: Negative for cough, shortness of breath.    Cardiovascular: Negative for chest pain, palpitations, leg swelling.   Gastrointestinal: Negative for abdominal pain, constipation, diarrhea, nausea, vomiting.   Endocrine: Negative for cold intolerance, heat intolerance.   Genitourinary: Negative for dysuria, frequency.   Musculoskeletal: as per HPI  Skin: Negative for rash, wound, erythema   Neurological: as per HPI  Psychiatric/Behavioral: Negative for confusion, hallucinations, anxiety  All other systems reviewed and are negative.      Past Medical History:   Past Medical  History:   Diagnosis Date    Depression     Leukodystrophy     Neurologic disorder        Past Surgical History:   No past surgical history on file.    Social History:   Social History     Tobacco Use    Smoking status: Never Smoker   Substance Use Topics    Alcohol use: Not on file    Drug use: Not on file       Family History:   Family History   Problem Relation Age of Onset    Other Other         in a wheelchair for unkown reasons to patient          Medications: Scheduled Meds:   baclofen  20 mg Oral TID    ferrous sulfate  325 mg Oral Daily    multivitamin  1 tablet Oral Daily    paroxetine  20 mg Oral QAM     Continuous Infusions:  PRN Meds:.acetaminophen, ramelteon, sodium chloride 0.9%    Allergies: Patient has No Known Allergies.    Physical Exam:     Vital Signs (Most Recent):  Temp: 98.7 °F (37.1 °C) (10/04/18 2258)  Pulse: 69 (10/04/18 2258)  Resp: 18 (10/04/18 2258)  BP: 124/77 (10/04/18 2258)  SpO2: 100 % (10/04/18 2258) Vital Signs Range (Last 24H):  Temp:  [98.3 °F (36.8 °C)-98.7 °F (37.1 °C)]   Pulse:  [69-78]   Resp:  [18]   BP: (113-139)/(77-79)   SpO2:  [100 %]    Body mass index is 37.42 kg/m².     Physical Exam:  Constitutional: Well-developed, well-nourished, non-distressed, not diaphoretic.   HENT: NC/AT, external ears normal, oropharynx clear, MMM w/o exudates.   Eyes: PERRL, EOMI, conjunctiva normal, no discharge b/l, no scleral icterus   Neck: normal ROM, supple  CV: RRR, no m/r/g, no carotid bruits, +2 peripheral pulses.  Pulmonary/Chest wall: Breathing comfortably w/o distress, CTAB, no w/r/r, no crackles.  GI: Soft, non-tender, non-distended, (+) BS, (+) BM   Musculoskeletal: Normal ROM, no edema, no atrophy, no tenderness throughout   Neurological: AAO x 4, CN II-XI in tact, nl sensation, nl strength/tone . Motor 5/5  Skin: warm, dry, (-) erythema, (-) rash, (-)pallor  Psych: normal mood and affect, normal behavior, thought content and judgement.  Vitals  reviewed.    Labs:      Chemistries:   Recent Labs   Lab  10/04/18   1852   NA  140   K  3.4*   CL  108   CO2  22*   BUN  9   CREATININE  0.7   CALCIUM  9.4   PROT  7.7   BILITOT  0.4   ALKPHOS  131   ALT  12   AST  15        CBC/Anemia Labs: Coags:    Recent Labs   Lab  10/04/18   1852   WBC  9.23   HGB  8.9*   HCT  30.3*   PLT  561*   MCV  70*   RDW  20.2*    No results for input(s): PT, INR, APTT in the last 168 hours.     Diagnostic Results:    MRI brain, C and T spine pending    Assessment and Plan:     Ms. Richard Perales is a 33 y.o. female with prior hx of leukodystrophy (see Dr Du) and depression/ anxiety , presented to the ED on 10/4 from Neurology clinic for admission for further evaluation of generalized weakness, concerning for leukodystrophy exacerbation     Active Hospital Problems    Diagnosis  POA    *Generalized weakness [R53.1]  Yes     Priority: 1 - High    Leukodystrophy [E75.29]  Yes     Priority: 1 - High     Chronic     Generalized subcortical with Positive MCP sign, Fabry's inconclusive. LP done at outside hospital with no oligoclonal band results       Left leg swelling [M79.89]  Yes    Microcytic anemia [D50.9]  Yes      Resolved Hospital Problems   No resolved problems to display.       Generalized weakness  Leukodystrophy  - concerning for leukodystrophy exacerbation. Vs psychogenic.   - MRI brain , C and T spine pending  - check Vit D, iron TIBC  - UA with no UTI. Check CXR for pna. Low infectious etiology suspicion   - PT OT eval  - Neurology consult in the AM. Deferring steroids per Dr Ana Laura FIGUEROA leg swelling  - check for DVT    Microcytic anemia  - check iron studies. Hb gof 9.0 baseline   - may require ferrous sulfate        Diet:  regular  GI PPx:  n/a  DVT PPx:  lovenox  Goals of Care:  Admitted to OBS    Signing Physician:     Emma Bey MD  Department of Hospital Medicine   [facility]  Pager [phone]  Spectra [phone]  10/5/2018

## 2018-10-05 NOTE — PLAN OF CARE
Problem: Physical Therapy Goal  Goal: Physical Therapy Goal  Outcome: Outcome(s) achieved Date Met: 10/05/18  Eval and DC complete  Patient has no inpatient PT needs  Discontinue Orders    Markell Dodson PT, DPT  10/5/2018  Pager [phone]

## 2018-10-05 NOTE — PLAN OF CARE
Problem: Patient Care Overview  Goal: Plan of Care Review  POC reviewed with patient.  No acute events overnight.  VSS. Safety maintained.

## 2018-10-05 NOTE — ASSESSMENT & PLAN NOTE
30 yr old female with leukodystrophy of unknown cause presenting with some confusion, urinary incontinence, gait disturbance and vision changes. Says she feels worse from baseline. Direct admit from clinic.  - MRI brain shows bilateral T2/FLAIR hyperintensities without enhancement  - Has been worked up for Fabry's, MLD, FXTAS  - Had an episode of acute worsening 2 yrs ago where she became quadriplegic. Showed dramatic response to steroids. 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  - Has been on Baclofen, trazodone and paroxetine    On Exam:  - Hypermetric saccades.  - Brisk reflexes  - LLE proximal weakness  - Coordination and gait normal    Labs:  - Vit D deficiency  - Microcytic anemia  - B12 normal    Imaging:  MRI brain W WO contrast - Persistent but decreased in intensity of T2/FLAIR signal hyperintensity in the white matter within the supratentorial infratentorial brain.  MRI cervical and thoracic spine - No significant abnormality.    Assessment and Plan:  - Given that her brain imaging looks better compared to her priors, she probably does not need steroids.  - It is likely that her symptomatic worsening is 2/2 anemia, vit d deficiency. Her B12 is normal.  - Although she does not need steroids, patient feels she is not at her baseline.  - Recommend Solumedrol 1g x1 and reassessing.   - Check B1, folate  - Treat anemia and vit D deficiency; FeSO4 325 BID and Vit D 50kU/week

## 2018-10-05 NOTE — DISCHARGE SUMMARY
Discharge Summary  Hospital Medicine       Name: Richard Perales  YOB: 1985 (Age: 33 y.o.)  Date of Admission: 10/4/2018  Date of Discharge: 10/5/2018  Attending Provider on Discharge: Zoila Ty MD  Hospital Medicine Team: [facility]  Code status: Full Code    Primary Care Provider: GERONIMO Guallpa MD    Discharge Diagnosis:  Active Hospital Problems    Diagnosis  POA    *Leukodystrophy [E75.29]  Yes     Chronic     Generalized subcortical with Positive MCP sign, Fabry's inconclusive. LP done at outside hospital with no oligoclonal band results       Left leg swelling [M79.89]  Yes    Iron deficiency anemia [D50.9]  Yes    Vitamin D deficiency [E55.9]  Yes    Microcytic anemia [D50.9]  Yes      Resolved Hospital Problems    Diagnosis Date Resolved POA    Generalized weakness [R53.1] 10/05/2018 Yes       HPI Per Dr. Bey's H&P:  Ms. Richard Perales is a 33 y.o. female with prior hx of leukodystrophy (see Dr Weinstein) and depression/ anxiety , presented to the ED on 10/4 from Neurology clinic for admission for further evaluation. Patient presented with concerns of increasing intermittent confusion,associated with balance problems, worsening panic attacks, slowed speech. Pt noteed that she typically has difficulty with focusing and memory as well as balance with her leukodystrophy. Pt was seen in neurology clinic 10/4 by Dr Weinstein who recommended admission to [facility]. Pt reported malodorous urine, however, evidence of UTI on admit. No fevers, dysuria, CP, SOB, abd pain, vision changes, HA, paresthesias. Pt Dr weinstein's clinic note, pt's leukodystrophy has been steroid responsive in the past. recommended MRI brain C and T spine on admit. On presentation, pt was stable , HDS, with labs wnl      Hospital Course:  Richard Perales is a 33 y.o female with PMHx of leukodystrophy, anxiety/depression who was sent to [facility] on 10/4/2018 from her Neurologist's office for further medical management  "of her generalized weakness and neurological symptoms. While in the ED, pt was noted to have microcytic anemia with H/H of 8.8/27 and MCV of 27.4. Iron panel was checked and pt was diagnosed with iron deficinecy anemia. Pt was given IV iron dextra 1000mg total and started on ferrous sulfate prior to discharge. Her Vit D levels were noted to be low at 7. She was started on ergocalciferol [id number] weekly to take for up to 8 weeks. Pt's folate was also noted to be low and pt was started on folic acid. In order to evaluate if pt has any malabsorption syndrome, pt was asked about GI symptoms.  Pt has been tolerating PO intake well without any nausea, vomiting, diarrhea, constipation, blood in stools or trouble urinating. Pt denies any fevers, chills, malaise, headaches, chest pain, SOB, cough. Pt has been able to ambulate without any distress.     Pt was evaluated by neurology who reported:   "30 yr old female with leukodystrophy of unknown cause presenting with some confusion, urinary incontinence, gait disturbance and vision changes. Says she feels worse from baseline. Direct admit from clinic.  - MRI brain shows bilateral T2/FLAIR hyperintensities without enhancement  - Has been worked up for Fabry's, MLD, FXTAS  - Had an episode of acute worsening 2 yrs ago where she became quadriplegic. Showed dramatic response to steroids. 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  - Has been on Baclofen, trazodone and paroxetine   On Exam:  - Hypermetric saccades.  - Brisk reflexes  - LLE proximal weakness  - Coordination and gait normal  - Given that her brain imaging looks better compared to her priors, she probably does not need steroids.  - It is likely that her symptomatic worsening is 2/2 anemia, vit d deficiency. Her B12 is normal.  - Although she does not need steroids, patient feels she is not at her baseline.  - Recommend Solumedrol 1g x1 and reassessing.   - Check " "B1, folate  - Treat anemia and vit D deficiency; FeSO4 325 BID and Vit D 50kU/week"    Pt was given solumedrol 1gx1 and reassessed after a few hours. Pt reports her numbness and tingling her her legs resolved and she was feeling much better. She was able to work with OT without any distress and and no OT needs. Pt was evaluated by neurology again and it was determined that pt did not need any more steroids at this time. Pt was discharged in stable condition and asked to f/u with neurology and PCP.      Labs:  Recent Labs   Lab  10/04/18   1852  10/05/18   0637   WBC  9.23  8.15   HGB  8.9*  8.0*   HCT  30.3*  27.4*   PLT  561*  488*     Recent Labs   Lab  10/04/18   1852  10/05/18   0637   NA  140  141   K  3.4*  3.6   CL  108  109   CO2  22*  25   BUN  9  9   CREATININE  0.7  0.7   GLU  96  102   CALCIUM  9.4  8.7     Recent Labs   Lab  10/04/18   1852   ALKPHOS  131   ALT  12   AST  15   ALBUMIN  3.5   PROT  7.7   BILITOT  0.4      No results for input(s): POCTGLUCOSE in the last 168 hours.  No results for input(s): CPK, CPKMB, MB, TROPONINI in the last 72 hours.    ROS (Positive in Bold, otherwise negative)  Constitutional: fever, chills, night sweats  CV: chest pain, edema, palpitations  Resp: SOB, cough, sputum production  GI: changes in appetite, NVDC, pain, melena, hematochezia, GERD, hematemesis  : Dysuria, hematuria, urinary urgency, frequency  MSK: arthralgia/myalgia, joint swelling  Neuro/Psych: anxiety, depression    PEx   Temp:  [98 °F (36.7 °C)-98.7 °F (37.1 °C)]   Pulse:  [69-90]   Resp:  [18]   BP: ()/(61-80)   SpO2:  [99 %-100 %]      General: no distress   Lungs: clear to ausculation anteriorly and posteriorly   Heart: regular rate and rhythm   Abdomen: normal bowel sounds, soft, no tenderness   Extremities: no edema. No clubbing or cyanosis     Procedures:   Imaging:  MRI brain W WO contrast - Persistent but decreased in intensity of T2/FLAIR signal hyperintensity in the white matter " within the supratentorial infratentorial brain.  MRI cervical and thoracic spine - No significant abnormality.    Consultants: Neurology    Current Discharge Medication List      START taking these medications    Details   ergocalciferol (ERGOCALCIFEROL) 50,000 unit Cap Take 1 capsule (50,000 Units total) by mouth every 7 days. for 7 doses  Qty: 7 capsule, Refills: 0      ferrous sulfate 325 (65 FE) MG EC tablet Take 1 tablet (325 mg total) by mouth once daily.  Refills: 0      folic acid (FOLVITE) 400 MCG tablet Take 1 tablet (400 mcg total) by mouth once daily.  Refills: 0         CONTINUE these medications which have NOT CHANGED    Details   acetaminophen (TYLENOL) 325 MG tablet Take 2 tablets (650 mg total) by mouth every 6 (six) hours.  Refills: 0      baclofen (LIORESAL) 20 MG tablet Take 1 tablet (20 mg total) by mouth 3 (three) times daily.  Qty: 90 tablet, Refills: 5    Associated Diagnoses: Leukoencephalopathy      multivitamin (THERAGRAN) tablet Take 1 tablet by mouth once daily.      paroxetine (PAXIL) 20 MG tablet Take 1 tablet (20 mg total) by mouth every morning.  Qty: 30 tablet, Refills: 11    Associated Diagnoses: Leukoencephalopathy      traZODone (DESYREL) 50 MG tablet Take 1 tablet (50 mg total) by mouth every evening.  Qty: 30 tablet, Refills: 6             The relevant and important risks, side effects, and benefits of their medications were reviewed with patient during hospitalization and at discharge. The patient was given the opportunity to discuss and ask questions about their medications, including target symptoms, potential risks, side effects and benefits of their medications, as well as their expected prognosis if non-medication treatment options were chosen.  The patient expresses understanding of all these options and information and voluntarily consents to treatment.    Discharge Diet:regular diet with Normal Fluid intake of 1500 - 2000 mL per day    Activity: activity as  tolerated    Discharge Condition: Stable    Disposition: Home or Self Care    Tests pending at the time of discharge: none      Time spent  on the discharge of the patient including review of hospital course with the patient. reviewing discharge medications and arranging follow-up care: <28 mins    Discharge examination Patient was seen and examined on the date of discharge and determined to be suitable for discharge.    Discharge plan and follow up:  It is critical that you make your follow-up appointment(s). If you are discharged on the weekend or after business hours, or if we are unable to schedule these appointments for you for any reason, you or a family member need to call during the next business day to schedule your appointment(s).    -Follow up with you PCP, GERONIMO Guallpa MD within 1-2 weeks as arranged with SW and treatment team prior to discharge  -Take all medications as prescribed and listed above.  -f/u with neurology within 1-2 weeks  -Recommended Follow-up Tests: repeat CBC, vit D, folic acid per PCP  -recommend further work up for malabsorption syndromes       - Please return to ED or call your physician if you have:         1. Fevers > 101.5 unresponsive to tylenol.       2. Abdominal pain and/or distention       3. Intractable nausea, vomiting or diarrhea       4. Inability to tolerate adequate oral intake of food       5. Neurologic changes, chest pain or shortness of breath       Zoila Ty MD  Hospital Medicine Staff  [phone] pager

## 2018-10-05 NOTE — SUBJECTIVE & OBJECTIVE
Past Medical History:   Diagnosis Date    Depression     Leukodystrophy     Neurologic disorder        No past surgical history on file.    Review of patient's allergies indicates:  No Known Allergies    Current Neurological Medications: Trazodone, Baclofen, Paroxetine    No current facility-administered medications on file prior to encounter.      Current Outpatient Medications on File Prior to Encounter   Medication Sig    acetaminophen (TYLENOL) 325 MG tablet Take 2 tablets (650 mg total) by mouth every 6 (six) hours.    baclofen (LIORESAL) 20 MG tablet Take 1 tablet (20 mg total) by mouth 3 (three) times daily.    multivitamin (THERAGRAN) tablet Take 1 tablet by mouth once daily.    paroxetine (PAXIL) 20 MG tablet Take 1 tablet (20 mg total) by mouth every morning.    traZODone (DESYREL) 50 MG tablet Take 1 tablet (50 mg total) by mouth every evening.     Family History     Problem Relation (Age of Onset)    Other Other        Tobacco Use    Smoking status: Never Smoker   Substance and Sexual Activity    Alcohol use: Not on file    Drug use: Not on file    Sexual activity: Not on file     Review of Systems   Constitutional: Negative for chills and fever.   HENT: Negative for trouble swallowing.    Eyes: Negative for pain and visual disturbance.   Respiratory: Negative for cough and shortness of breath.    Cardiovascular: Negative for chest pain and leg swelling.   Gastrointestinal: Negative for abdominal pain, nausea and vomiting.   Genitourinary: Positive for frequency. Negative for difficulty urinating.   Musculoskeletal: Negative for arthralgias.   Skin: Negative for color change.   Neurological: Positive for speech difficulty. Negative for tremors, weakness, light-headedness, numbness and headaches.   Psychiatric/Behavioral: Negative for agitation and confusion.     Objective:     Vital Signs (Most Recent):  Temp: 98 °F (36.7 °C) (10/05/18 0756)  Pulse: 77 (10/05/18 0756)  Resp: 18 (10/05/18  0756)  BP: 121/80 (10/05/18 0756)  SpO2: 99 % (10/05/18 0756) Vital Signs (24h Range):  Temp:  [98 °F (36.7 °C)-98.7 °F (37.1 °C)] 98 °F (36.7 °C)  Pulse:  [69-78] 77  Resp:  [18] 18  SpO2:  [99 %-100 %] 99 %  BP: ()/(61-80) 121/80     Weight: 98.9 kg (218 lb 0.6 oz)  Body mass index is 37.43 kg/m².    Physical Exam   Constitutional: She is oriented to person, place, and time. No distress.   Eyes: Pupils are equal, round, and reactive to light.   Cardiovascular: Normal rate and regular rhythm.   Pulmonary/Chest: Effort normal and breath sounds normal.   Abdominal: Soft. Bowel sounds are normal.   Musculoskeletal: She exhibits no edema.   Neurological: She is alert and oriented to person, place, and time. She has a normal Finger-Nose-Finger Test. Gait normal.   Reflex Scores:       Tricep reflexes are 3+ on the right side and 3+ on the left side.       Bicep reflexes are 3+ on the right side and 3+ on the left side.       Patellar reflexes are 3+ on the right side and 3+ on the left side.       Achilles reflexes are 3+ on the right side and 3+ on the left side.  Nursing note and vitals reviewed.      NEUROLOGICAL EXAMINATION:     MENTAL STATUS   Oriented to person, place, and time.   Oriented to person.   Oriented to place.   Oriented to time.   Registration: recalls 3 of 3 objects. Recall at 5 minutes: recalls 3 of 3 objects. Follows 3 step commands.   Level of consciousness: alert       Seems a little aphasic.     CRANIAL NERVES     CN II   Visual acuity: normal  Right visual field deficit: none  Left visual field deficit: none     CN III, IV, VI   Pupils are equal, round, and reactive to light.    CN V   Facial sensation intact.     CN VII   Facial expression full, symmetric.     CN IX, X   CN IX normal.     CN XI   CN XI normal.     CN XII   CN XII normal.        Hypermetric saccades.     MOTOR EXAM   Overall muscle tone: normal  Right arm pronator drift: absent  Left arm pronator drift: absent    Strength    Right deltoid: 5/5  Left deltoid: 5/5  Right biceps: 5/5  Left biceps: 5/5  Right triceps: 5/5  Left triceps: 5/5  Right iliopsoas: 5/5  Left iliopsoas: 5/5  Right quadriceps: 5/5  Left quadriceps: 4/5  Right hamstrin/5  Left hamstrin/5  Right glutei: 5/5  Left glutei: 4/5    REFLEXES     Reflexes   Right biceps: 3+  Left biceps: 3+  Right triceps: 3+  Left triceps: 3+  Right patellar: 3+  Left patellar: 3+  Right achilles: 3+  Left achilles: 3+  Right Canela: present  Left Canela: present  Right ankle clonus: present  Left ankle clonus: present    SENSORY EXAM   Light touch normal.     GAIT AND COORDINATION     Gait  Gait: normal     Coordination   Finger to nose coordination: normal    Tremor   Resting tremor: absent       Romberg's negative.       Significant Labs:   Hemoglobin A1c: No results for input(s): HGBA1C in the last 720 hours.  Blood Culture: No results for input(s): LABBLOO in the last 48 hours.  BMP:   Recent Labs   Lab  10/04/18   1852  10/05/18   0637   GLU  96  102   NA  140  141   K  3.4*  3.6   CL  108  109   CO2  22*  25   BUN  9  9   CREATININE  0.7  0.7   CALCIUM  9.4  8.7     CBC:   Recent Labs   Lab  10/04/18   1852  10/05/18   0637   WBC  9.23  8.15   HGB  8.9*  8.0*   HCT  30.3*  27.4*   PLT  561*  488*     CMP:   Recent Labs   Lab  10/04/18   1852  10/05/18   0637   GLU  96  102   NA  140  141   K  3.4*  3.6   CL  108  109   CO2  22*  25   BUN  9  9   CREATININE  0.7  0.7   CALCIUM  9.4  8.7   PROT  7.7   --    ALBUMIN  3.5   --    BILITOT  0.4   --    ALKPHOS  131   --    AST  15   --    ALT  12   --    ANIONGAP  10  7*   EGFRNONAA  >60.0  >60.0     CSF Culture: No results for input(s): CSFCULTURE in the last 48 hours.  CSF Studies: No results for input(s): ALIQUT, APPEARCSF, COLORCSF, CSFWBC, CSFRBC, GLUCCSF, LDHCSF, PROTEINCSF, VDRLCSF in the last 48 hours.  Inflammatory Markers: No results for input(s): SEDRATE, CRP, PROCAL in the last 48 hours.  POCT Glucose: No results  for input(s): POCTGLUCOSE in the last 24 hours.  Prealbumin: No results for input(s): PREALBUMIN in the last 48 hours.  Respiratory Culture: No results for input(s): GSRESP, RESPIRATORYC in the last 48 hours.  Urine Culture: No results for input(s): LABURIN in the last 48 hours.  Urine Studies:   Recent Labs   Lab  10/04/18   1924   COLORU  Yellow   APPEARANCEUA  Hazy*   PHUR  5.0   SPECGRAV  >=1.030*   PROTEINUA  1+*   GLUCUA  Negative   KETONESU  Negative   BILIRUBINUA  Negative   OCCULTUA  Negative   NITRITE  Negative   UROBILINOGEN  Negative   LEUKOCYTESUR  Negative   RBCUA  8*   WBCUA  1   BACTERIA  None   SQUAMEPITHEL  2   HYALINECASTS  5*     All pertinent lab results from the past 24 hours have been reviewed.    Significant Imaging: I have reviewed all pertinent imaging results/findings within the past 24 hours.

## 2018-10-05 NOTE — CONSULTS
[facility]  Neurology  Consult Note    Patient Name: Richard Perales  MRN: [id number]  Admission Date: 10/4/2018  Hospital Length of Stay: 0 days  Code Status: Full Code   Attending Provider: Zoila Ty MD   Consulting Provider: Caden Chaudhary MD  Primary Care Physician: Primary Doctor No  Principal Problem:Generalized weakness    Inpatient consult to neurology  Consult performed by: Caden Chaudhary MD  Consult ordered by: Emma Bey MD         Subjective:     Chief Complaint:  Weakness    HPI:   30 yr old female admitted from neurology clinic by Dr. Du.   Has a history of progressive neurological decline first starting in 2015. She started to experience visual changes in Oct, followed by slurring of speech a few months later and then gait instability. Was initially seen at [place] and underwent extensive neurologic and rheumatologic workup (no records available). She first established care with Dr Du in 2016. Had an EEG done at that time which was normal. Her MRI brain showed periventricular white matter changes on T2/FLAIR with no enhancement. She was worked up for MLD, FXTAS and Fabry's which were negative. She received high dose steroids to which she responded well. CLIPPERS was suspected although she had no pontine enhancement on her imaging. Since then has been periodically following up in clinic. Repeat imaging showed no worsening of the white matter changes. She underwent an LP in Nov 2017 which was unremarkable. She has been on baclofen, trazodone since then.   Today, she says that since March, her symptomps have started to worsen again similar to 2 yrs ago. Her speech has become slower and she has been having memory issues. She needs to frequently pause to remember incidents. She also says her gait and vision has worsened slightly. Also reports increased urinary frequency and some chest tightness. Says that her ophthalmologist told her that  her that she has a blood clot behind her eye but she denies any eye pain.    Denies any tobacco use. Drinks alcohol occasionally.     Past Medical History:   Diagnosis Date    Depression     Leukodystrophy     Neurologic disorder        No past surgical history on file.    Review of patient's allergies indicates:  No Known Allergies    Current Neurological Medications: Trazodone, Baclofen, Paroxetine    No current facility-administered medications on file prior to encounter.      Current Outpatient Medications on File Prior to Encounter   Medication Sig    acetaminophen (TYLENOL) 325 MG tablet Take 2 tablets (650 mg total) by mouth every 6 (six) hours.    baclofen (LIORESAL) 20 MG tablet Take 1 tablet (20 mg total) by mouth 3 (three) times daily.    multivitamin (THERAGRAN) tablet Take 1 tablet by mouth once daily.    paroxetine (PAXIL) 20 MG tablet Take 1 tablet (20 mg total) by mouth every morning.    traZODone (DESYREL) 50 MG tablet Take 1 tablet (50 mg total) by mouth every evening.     Family History     Problem Relation (Age of Onset)    Other Other        Tobacco Use    Smoking status: Never Smoker   Substance and Sexual Activity    Alcohol use: Not on file    Drug use: Not on file    Sexual activity: Not on file     Review of Systems   Constitutional: Negative for chills and fever.   HENT: Negative for trouble swallowing.    Eyes: Negative for pain and visual disturbance.   Respiratory: Negative for cough and shortness of breath.    Cardiovascular: Negative for chest pain and leg swelling.   Gastrointestinal: Negative for abdominal pain, nausea and vomiting.   Genitourinary: Positive for frequency. Negative for difficulty urinating.   Musculoskeletal: Negative for arthralgias.   Skin: Negative for color change.   Neurological: Positive for speech difficulty. Negative for tremors, weakness, light-headedness, numbness and headaches.   Psychiatric/Behavioral: Negative for agitation and confusion.      Objective:     Vital Signs (Most Recent):  Temp: 98 °F (36.7 °C) (10/05/18 0756)  Pulse: 77 (10/05/18 0756)  Resp: 18 (10/05/18 0756)  BP: 121/80 (10/05/18 0756)  SpO2: 99 % (10/05/18 0756) Vital Signs (24h Range):  Temp:  [98 °F (36.7 °C)-98.7 °F (37.1 °C)] 98 °F (36.7 °C)  Pulse:  [69-78] 77  Resp:  [18] 18  SpO2:  [99 %-100 %] 99 %  BP: ()/(61-80) 121/80     Weight: 98.9 kg (218 lb 0.6 oz)  Body mass index is 37.43 kg/m².    Physical Exam   Constitutional: She is oriented to person, place, and time. No distress.   Eyes: Pupils are equal, round, and reactive to light.   Cardiovascular: Normal rate and regular rhythm.   Pulmonary/Chest: Effort normal and breath sounds normal.   Abdominal: Soft. Bowel sounds are normal.   Musculoskeletal: She exhibits no edema.   Neurological: She is alert and oriented to person, place, and time. She has a normal Finger-Nose-Finger Test. Gait normal.   Reflex Scores:       Tricep reflexes are 3+ on the right side and 3+ on the left side.       Bicep reflexes are 3+ on the right side and 3+ on the left side.       Patellar reflexes are 3+ on the right side and 3+ on the left side.       Achilles reflexes are 3+ on the right side and 3+ on the left side.  Nursing note and vitals reviewed.      NEUROLOGICAL EXAMINATION:     MENTAL STATUS   Oriented to person, place, and time.   Oriented to person.   Oriented to place.   Oriented to time.   Registration: recalls 3 of 3 objects. Recall at 5 minutes: recalls 3 of 3 objects. Follows 3 step commands.   Level of consciousness: alert       Seems a little aphasic.     CRANIAL NERVES     CN II   Visual acuity: normal  Right visual field deficit: none  Left visual field deficit: none     CN III, IV, VI   Pupils are equal, round, and reactive to light.    CN V   Facial sensation intact.     CN VII   Facial expression full, symmetric.     CN IX, X   CN IX normal.     CN XI   CN XI normal.     CN XII   CN XII normal.        Hypermetric  saccades.     MOTOR EXAM   Overall muscle tone: normal  Right arm pronator drift: absent  Left arm pronator drift: absent    Strength   Right deltoid: 5/5  Left deltoid: 5/5  Right biceps: 5/5  Left biceps: 5/5  Right triceps: 5/5  Left triceps: 5/5  Right iliopsoas: 5/5  Left iliopsoas: 5/5  Right quadriceps: 5/5  Left quadriceps: 4/5  Right hamstrin/5  Left hamstrin/5  Right glutei: 5/5  Left glutei: 4/5    REFLEXES     Reflexes   Right biceps: 3+  Left biceps: 3+  Right triceps: 3+  Left triceps: 3+  Right patellar: 3+  Left patellar: 3+  Right achilles: 3+  Left achilles: 3+  Right Canela: present  Left Canela: present  Right ankle clonus: present  Left ankle clonus: present    SENSORY EXAM   Light touch normal.     GAIT AND COORDINATION     Gait  Gait: normal     Coordination   Finger to nose coordination: normal    Tremor   Resting tremor: absent       Romberg's negative.       Significant Labs:   Hemoglobin A1c: No results for input(s): HGBA1C in the last 720 hours.  Blood Culture: No results for input(s): LABBLOO in the last 48 hours.  BMP:   Recent Labs   Lab  10/04/18   1852  10/05/18   0637   GLU  96  102   NA  140  141   K  3.4*  3.6   CL  108  109   CO2  22*  25   BUN  9  9   CREATININE  0.7  0.7   CALCIUM  9.4  8.7     CBC:   Recent Labs   Lab  10/04/18   1852  10/05/18   0637   WBC  9.23  8.15   HGB  8.9*  8.0*   HCT  30.3*  27.4*   PLT  561*  488*     CMP:   Recent Labs   Lab  10/04/18   1852  10/05/18   0637   GLU  96  102   NA  140  141   K  3.4*  3.6   CL  108  109   CO2  22*  25   BUN  9  9   CREATININE  0.7  0.7   CALCIUM  9.4  8.7   PROT  7.7   --    ALBUMIN  3.5   --    BILITOT  0.4   --    ALKPHOS  131   --    AST  15   --    ALT  12   --    ANIONGAP  10  7*   EGFRNONAA  >60.0  >60.0     CSF Culture: No results for input(s): CSFCULTURE in the last 48 hours.  CSF Studies: No results for input(s): ALIQUT, APPEARCSF, COLORCSF, CSFWBC, CSFRBC, GLUCCSF, LDHCSF, PROTEINCSF, VDRLCSF in the  last 48 hours.  Inflammatory Markers: No results for input(s): SEDRATE, CRP, PROCAL in the last 48 hours.  POCT Glucose: No results for input(s): POCTGLUCOSE in the last 24 hours.  Prealbumin: No results for input(s): PREALBUMIN in the last 48 hours.  Respiratory Culture: No results for input(s): GSRESP, RESPIRATORYC in the last 48 hours.  Urine Culture: No results for input(s): LABURIN in the last 48 hours.  Urine Studies:   Recent Labs   Lab  10/04/18   1924   COLORU  Yellow   APPEARANCEUA  Hazy*   PHUR  5.0   SPECGRAV  >=1.030*   PROTEINUA  1+*   GLUCUA  Negative   KETONESU  Negative   BILIRUBINUA  Negative   OCCULTUA  Negative   NITRITE  Negative   UROBILINOGEN  Negative   LEUKOCYTESUR  Negative   RBCUA  8*   WBCUA  1   BACTERIA  None   SQUAMEPITHEL  2   HYALINECASTS  5*     All pertinent lab results from the past 24 hours have been reviewed.    Significant Imaging: I have reviewed all pertinent imaging results/findings within the past 24 hours.    Assessment and Plan:     Microcytic anemia    - FeSO4 325mg BID        Leukodystrophy    30 yr old female with leukodystrophy of unknown cause presenting with some confusion, urinary incontinence, gait disturbance and vision changes. Says she feels worse from baseline. Direct admit from clinic.  - MRI brain shows bilateral T2/FLAIR hyperintensities without enhancement  - Has been worked up for Fabry's, MLD, FXTAS  - Had an episode of acute worsening 2 yrs ago where she became quadriplegic. Showed dramatic response to steroids. 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  - Has been on Baclofen, trazodone and paroxetine    On Exam:  - Hypermetric saccades.  - Brisk reflexes  - LLE proximal weakness  - Coordination and gait normal    Labs:  - Vit D deficiency  - Microcytic anemia  - B12 normal    Imaging:  MRI brain W WO contrast - Persistent but decreased in intensity of T2/FLAIR signal hyperintensity in the white  matter within the supratentorial infratentorial brain.  MRI cervical and thoracic spine - No significant abnormality.    Assessment and Plan:  - Given that her brain imaging looks better compared to her priors, she probably does not need steroids.  - It is likely that her symptomatic worsening is 2/2 anemia, vit d deficiency. Her B12 is normal.  - Although she does not need steroids, patient feels she is not at her baseline.  - Recommend Solumedrol 1g x1 and reassessing.   - Check B1, folate  - Treat anemia and vit D deficiency; FeSO4 325 BID and Vit D 50kU/week            VTE Risk Mitigation (From admission, onward)        Ordered     IP VTE LOW RISK PATIENT  Once      10/04/18 2303     Place sequential compression device  Until discontinued      10/04/18 2303     Place LEXII hose  Until discontinued      10/04/18 2027          Thank you for your consult. I will follow-up with patient. Please contact us if you have any additional questions.    Caden Chaudhary MD  Neurology  [facility]

## 2018-10-05 NOTE — ED PROVIDER NOTES
Encounter Date: 10/4/2018    SCRIBE #1 NOTE: IClemente, am scribing for, and in the presence of,  Dr. Ugarte. I have scribed the entire note.   SCRIBE #2 NOTE: IMathew, am scribing for, and in the presence of,  Dr. Ugarte. I have scribed the remaining portions of the note not scribed by Scribe #1.     History     Chief Complaint   Patient presents with    Leukodystrophy     sent to er to be admitted to obs for decline.     Time patient was seen by the provider: 7:32 PM      The patient is a 33 y.o. female with co-morbidities including: depression, anxiety and leukodystrophy who presents to the ED with a complaint of increased confusion. Pt was seen in neurology clinic today and told to come to ED for initiation of work up and admission. Pt currently complains of leg weakness, slowed speech and several panic attacks with associated chest pain and SOB. Pt notes that she typically has difficulty with focusing and memory as well as balance with her leukodystrophy. No fevers. She does endorse malodorous and frequent urination. No dysuria. She denies headache, vision changes, fever, abdominal pain, and numbness or tingling. Pt reports having 6 panic attacks in the last 3 months and describes 3 of them as more severe. She had her last MRI in march 2017.        The history is provided by the patient.     Review of patient's allergies indicates:  No Known Allergies  Past Medical History:   Diagnosis Date    Leukodystrophy      No past surgical history on file.  Family History   Problem Relation Age of Onset    Other Other         in a wheelchair for unkown reasons to patient      Social History     Tobacco Use    Smoking status: Never Smoker   Substance Use Topics    Alcohol use: Not on file    Drug use: Not on file     Review of Systems   Constitutional: Negative for fever.   HENT: Negative for sore throat.    Eyes: Negative for visual disturbance.   Respiratory: Negative for shortness of breath  (associated with panic attacks).    Cardiovascular: Negative for chest pain (associated with panic attacks).   Gastrointestinal: Negative for nausea.   Genitourinary: Positive for frequency (more frequent urination). Negative for dysuria.   Musculoskeletal: Negative for back pain.   Skin: Negative for rash.   Neurological: Positive for speech difficulty (slowed speech). Negative for weakness, numbness and headaches.   Psychiatric/Behavioral: Positive for confusion and decreased concentration. The patient is nervous/anxious.        Physical Exam     Initial Vitals [10/04/18 1805]   BP Pulse Resp Temp SpO2   139/78 78 18 98.3 °F (36.8 °C) 100 %      MAP       --         Physical Exam    Nursing note and vitals reviewed.  Constitutional: She appears well-developed and well-nourished. No distress.   HENT:   Head: Normocephalic and atraumatic.   Eyes: EOM are normal. Pupils are equal, round, and reactive to light.   Neck: Normal range of motion. Neck supple.   Cardiovascular: Normal rate and regular rhythm.   Pulmonary/Chest: Breath sounds normal. No respiratory distress. She has no wheezes.   Abdominal: Soft. Bowel sounds are normal. She exhibits no distension. There is no tenderness.   Musculoskeletal: Normal range of motion. She exhibits no edema or tenderness.   Neurological: She is alert and oriented to person, place, and time. No cranial nerve deficit. GCS score is 15. GCS eye subscore is 4. GCS verbal subscore is 5. GCS motor subscore is 6.   Slightly decreased strength lt LE 4/5 otherwise 5/5 throughout   Skin: Skin is warm and dry. Capillary refill takes less than 2 seconds.         ED Course   Procedures  Labs Reviewed   CBC W/ AUTO DIFFERENTIAL - Abnormal; Notable for the following components:       Result Value    Hemoglobin 8.9 (*)     Hematocrit 30.3 (*)     MCV 70 (*)     MCH 20.6 (*)     MCHC 29.4 (*)     RDW 20.2 (*)     Platelets 561 (*)     All other components within normal limits   COMPREHENSIVE  METABOLIC PANEL - Abnormal; Notable for the following components:    Potassium 3.4 (*)     CO2 22 (*)     All other components within normal limits   URINALYSIS, REFLEX TO URINE CULTURE - Abnormal; Notable for the following components:    Appearance, UA Hazy (*)     Specific Gravity, UA >=1.030 (*)     Protein, UA 1+ (*)     All other components within normal limits    Narrative:     Preferred Collection Type->Urine, Clean Catch   URINALYSIS MICROSCOPIC - Abnormal; Notable for the following components:    RBC, UA 8 (*)     Hyaline Casts, UA 5 (*)     All other components within normal limits    Narrative:     Preferred Collection Type->Urine, Clean Catch   TSH   POCT URINE PREGNANCY     EKG Readings: (Independently Interpreted)   Sinus rate 64, no acute ischemic changes       Imaging Results          MRI Thoracic Spine W WO Cont (In process)    Procedure changed from MRI Thoracic Spine With Contrast                  Medical Decision Making:   History:   Old Medical Records: I decided to obtain old medical records.  Initial Assessment:   32 y/o F known leukodystrophy presents with worsening of focus, memory, balance and left sided weakness.  Pt sent to ED for initiation of work up, for MR and admission for neurology consult.  Ddx: worsening leukodystrophy, UTI  Routine blood work ordered. UA and CXR  MRI brain, cervical and thoracic spine with contrast ordered.  Consult neurology  Admit for observation  Independently Interpreted Test(s):   I have ordered and independently interpreted EKG Reading(s) - see prior notes  Clinical Tests:   Lab Tests: Ordered and Reviewed  Radiological Study: Ordered  Medical Tests: Ordered and Reviewed            Scribe Attestation:   Scribe #1: I performed the above scribed service and the documentation accurately describes the services I performed. I attest to the accuracy of the note.  Scribe #2: I performed the above scribed service and the documentation accurately describes the  services I performed. I attest to the accuracy of the note.               Clinical Impression:   The primary encounter diagnosis was Leukodystrophy. A diagnosis of Weakness was also pertinent to this visit.      Disposition:   Disposition: Placed in Observation  Condition: Stable                        Agustina Ugarte MD  10/04/18 1069

## 2018-10-05 NOTE — HPI
30 yr old female admitted from neurology clinic by Dr. Du.   Has a history of progressive neurological decline first starting in 2015. She started to experience visual changes in Oct, followed by slurring of speech a few months later and then gait instability. Was initially seen at [place] and underwent extensive neurologic and rheumatologic workup (no records available). She first established care with Dr Du in 2016. Had an EEG done at that time which was normal. Her MRI brain showed periventricular white matter changes on T2/FLAIR with no enhancement. She was worked up for MLD, FXTAS and Fabry's which were negative. She received high dose steroids to which she responded well. CLIPPERS was suspected although she had no pontine enhancement on her imaging. Since then has been periodically following up in clinic. Repeat imaging showed no worsening of the white matter changes. She underwent an LP in Nov 2017 which was unremarkable. She has been on baclofen, trazodone since then.   Today, she says that since March, her symptomps have started to worsen again similar to 2 yrs ago. Her speech has become slower and she has been having memory issues. She needs to frequently pause to remember incidents. She also says her gait and vision has worsened slightly. Also reports increased urinary frequency and some chest tightness. Says that her ophthalmologist told her that her that she has a blood clot behind her eye but she denies any eye pain.    Denies any tobacco use. Drinks alcohol occasionally.

## 2018-10-06 NOTE — PLAN OF CARE
Problem: Patient Care Overview  Goal: Plan of Care Review   10/05/18 0275   Coping/Psychosocial   Plan Of Care Reviewed With patient;significant other     Patient is awake, alert and oriented. No apparent distress. VSS. No complaints of no confusion or weakness. POC discussed with patient and spouse. Patient denies any pain. Will d/c home after iron infusion. No complaints with infusion. Will continue to monitor.

## 2018-11-20 NOTE — TELEPHONE ENCOUNTER
----- Message from Gwendolyn Siu sent at 4/28/2017 11:42 AM CDT -----  Contact: Self  Pt is calling requesting to speak with Staff regarding her referral to another physician and a medication refill of Praxitene (Not sure of spelling).    She can be reached at [phone].    Thank you.  
Left message for returned call.   
20-Nov-2018

## 2018-12-19 ENCOUNTER — PATIENT MESSAGE (OUTPATIENT)
Dept: NEUROLOGY | Facility: CLINIC | Age: 33
End: 2018-12-19

## 2019-01-07 ENCOUNTER — PATIENT MESSAGE (OUTPATIENT)
Dept: NEUROLOGY | Facility: CLINIC | Age: 34
End: 2019-01-07

## 2019-02-17 ENCOUNTER — PATIENT MESSAGE (OUTPATIENT)
Dept: NEUROLOGY | Facility: CLINIC | Age: 34
End: 2019-02-17

## 2019-02-21 ENCOUNTER — PATIENT MESSAGE (OUTPATIENT)
Dept: NEUROLOGY | Facility: CLINIC | Age: 34
End: 2019-02-21

## 2019-02-21 DIAGNOSIS — D21.9 FIBROID: Primary | ICD-10-CM

## 2019-02-26 ENCOUNTER — PATIENT MESSAGE (OUTPATIENT)
Dept: NEUROLOGY | Facility: CLINIC | Age: 34
End: 2019-02-26

## 2019-04-16 ENCOUNTER — OFFICE VISIT (OUTPATIENT)
Dept: OBSTETRICS AND GYNECOLOGY | Facility: CLINIC | Age: 34
End: 2019-04-16
Payer: MEDICARE

## 2019-04-16 ENCOUNTER — HOSPITAL ENCOUNTER (OUTPATIENT)
Dept: RADIOLOGY | Facility: OTHER | Age: 34
Discharge: HOME | End: 2019-04-16
Attending: OBSTETRICS & GYNECOLOGY
Payer: MEDICARE

## 2019-04-16 VITALS
BODY MASS INDEX: 39.44 KG/M2 | SYSTOLIC BLOOD PRESSURE: 123 MMHG | HEIGHT: 64 IN | DIASTOLIC BLOOD PRESSURE: 88 MMHG | WEIGHT: 231 LBS

## 2019-04-16 DIAGNOSIS — N93.9 ABNORMAL UTERINE BLEEDING (AUB): ICD-10-CM

## 2019-04-16 DIAGNOSIS — D25.9 UTERINE LEIOMYOMA, UNSPECIFIED LOCATION: ICD-10-CM

## 2019-04-16 DIAGNOSIS — D25.9 UTERINE LEIOMYOMA, UNSPECIFIED LOCATION: Primary | ICD-10-CM

## 2019-04-16 PROCEDURE — 99203 OFFICE O/P NEW LOW 30 MIN: CPT | Mod: S$GLB,,, | Performed by: OBSTETRICS & GYNECOLOGY

## 2019-04-16 PROCEDURE — 76856 US EXAM PELVIC COMPLETE: CPT | Mod: 26,,, | Performed by: RADIOLOGY

## 2019-04-16 PROCEDURE — 76856 US PELVIS COMP WITH TRANSVAG NON-OB (XPD): ICD-10-PCS | Mod: 26,,, | Performed by: RADIOLOGY

## 2019-04-16 PROCEDURE — 99999 PR PBB SHADOW E&M-EST. PATIENT-LVL III: CPT | Mod: PBBFAC,,, | Performed by: OBSTETRICS & GYNECOLOGY

## 2019-04-16 PROCEDURE — 76830 TRANSVAGINAL US NON-OB: CPT | Mod: TC

## 2019-04-16 PROCEDURE — 3008F BODY MASS INDEX DOCD: CPT | Mod: CPTII,S$GLB,, | Performed by: OBSTETRICS & GYNECOLOGY

## 2019-04-16 PROCEDURE — 3008F PR BODY MASS INDEX (BMI) DOCUMENTED: ICD-10-PCS | Mod: CPTII,S$GLB,, | Performed by: OBSTETRICS & GYNECOLOGY

## 2019-04-16 PROCEDURE — 76830 US PELVIS COMP WITH TRANSVAG NON-OB (XPD): ICD-10-PCS | Mod: 26,,, | Performed by: RADIOLOGY

## 2019-04-16 PROCEDURE — 99203 PR OFFICE/OUTPT VISIT, NEW, LEVL III, 30-44 MIN: ICD-10-PCS | Mod: S$GLB,,, | Performed by: OBSTETRICS & GYNECOLOGY

## 2019-04-16 PROCEDURE — 76830 TRANSVAGINAL US NON-OB: CPT | Mod: 26,,, | Performed by: RADIOLOGY

## 2019-04-16 PROCEDURE — 99999 PR PBB SHADOW E&M-EST. PATIENT-LVL III: ICD-10-PCS | Mod: PBBFAC,,, | Performed by: OBSTETRICS & GYNECOLOGY

## 2019-04-16 NOTE — H&P (VIEW-ONLY)
HISTORY OF PRESENT ILLNESS:    Richard Perales is a 33 y.o. female, No obstetric history on file., Patient's last menstrual period was 03/16/2019., HERE TODAY FROM [facility], REF BY NP TO DISCUSS OPTIONS RE FIBROID TX.  HEAVY MENSES MONTHLY, LAST UP TO 2 WEEKS.  HAS SOME ANEMIA WITH H/H 9.5/30, AND IN PAST TOOK FE - ADVISED TO RESTART.  HAD NL PAP 2/6/2019; TRICH TREATED AND NEGATIVE SHAYY.  IN PAST TREATED WITH P-ONLY PILL WITHOUT SIGNIF IMPROVEMENT.  PELVIC USG WITH ENLARGED UTERUS, MULT FIBROIDS, LARGEST 6.9 CM; ON EXAM 18 WEEKS SIZED UTERUS AND PELVIC USG HERE TO HELP DTMN POSITION OF THE FIBROIDS WITHIN THE UTERINE WALL.  IS INTERESTED IN FUTURE FERTILITY AND UNDERSTANDS THAT THE SOONER THE BETTER, YUAN IF PRIOR TO AGE 34YO.  DISCUSSED OPTIONS FOR MGMT - HYSTEROSCOPIC MYOMECTOMY, OPEN MYOMECTOMY, [place] - WITH THEIR RELATIVE EFFECTS ON A FUTURE PREGNANCY.  PLANS MYOMECTOMY  WOULD LIKELY BENEFIT FROM PRE-PREGNANCY VISIT WITH DR BARRETO/ PERINATOLOGY RE HER LEUKODYSTROPHY  4/16/2019 PELVIC USG:  FINDINGS:  Uterus:  Size: Enlarged measuring approximately 15.7 x 6.8 x 7.3 cm uterus has a heterogeneous echotexture  Masses: Multiple masses likely fibroids with the 3 largest measuring 6.1 x 6.1 x 7.8 cm near the fundus, 4.7 x 4.1 x 4.1 cm on the right near the fundus and 4.5 x 4.6 x 4.1 cm in the posterior body.  Endometrium: Obscured  Right ovary:  Size: 3.9 x 3.9 x 2.1 cm  Appearance: Anechoic area measuring 2.9 x 2.4 x 2.4 cm with posterior through transmission suggestive of a simple cyst.  Vascular flow: Normal.  Left ovary:  Not visualized  Free Fluid:  None.  There are nabothian cysts.      Impression     Enlarged uterus on the basis of multiple uterine fibroids which are most likely intramural.  The fibroids obscure the endometrium.  Anechoic area in the right ovary likely a simple cyst..       Past Medical History:   Diagnosis Date    Depression     Leukodystrophy     Neurologic disorder        History  reviewed. No pertinent surgical history.    MEDICATIONS AND ALLERGIES:      Current Outpatient Medications:     acetaminophen (TYLENOL) 325 MG tablet, Take 2 tablets (650 mg total) by mouth every 6 (six) hours., Disp: , Rfl: 0    baclofen (LIORESAL) 20 MG tablet, Take 1 tablet (20 mg total) by mouth 3 (three) times daily., Disp: 90 tablet, Rfl: 5    multivitamin (THERAGRAN) tablet, Take 1 tablet by mouth once daily., Disp: , Rfl:     paroxetine (PAXIL) 20 MG tablet, Take 1 tablet (20 mg total) by mouth every morning., Disp: 30 tablet, Rfl: 11    traZODone (DESYREL) 50 MG tablet, Take 1 tablet (50 mg total) by mouth every evening., Disp: 30 tablet, Rfl: 6    Review of patient's allergies indicates:  No Known Allergies    Family History   Problem Relation Age of Onset    Other Other         in a wheelchair for unkown reasons to patient        Social History     Socioeconomic History    Marital status: Single     Spouse name: Not on file    Number of children: Not on file    Years of education: Not on file    Highest education level: Not on file   Occupational History    Not on file   Social Needs    Financial resource strain: Not on file    Food insecurity:     Worry: Not on file     Inability: Not on file    Transportation needs:     Medical: Not on file     Non-medical: Not on file   Tobacco Use    Smoking status: Never Smoker   Substance and Sexual Activity    Alcohol use: Yes     Comment: rarely     Drug use: Never    Sexual activity: Not Currently   Lifestyle    Physical activity:     Days per week: Not on file     Minutes per session: Not on file    Stress: Not on file   Relationships    Social connections:     Talks on phone: Not on file     Gets together: Not on file     Attends Sikhism service: Not on file     Active member of club or organization: Not on file     Attends meetings of clubs or organizations: Not on file     Relationship status: Not on file   Other Topics Concern    Not  "on file   Social History Narrative    Not on file       OB HISTORY:    COMPREHENSIVE GYN HISTORY:  PAP History: Denies abnormal Paps.  Infection History: Denies STDs. Denies PID.  Benign History: Denies uterine fibroids. Denies ovarian cysts. Denies endometriosis. Denies other conditions.  Cancer History: Denies cervical cancer. Denies uterine cancer or hyperplasia. Denies ovarian cancer. Denies vulvar cancer or pre-cancer. Denies vaginal cancer or pre-cancer. Denies breast cancer. Denies colon cancer.  Sexual Activity History: Reports currently being sexually active  Menstrual History: Monthly. AS ABOVE.   Dysmenorrhea History: Reports mild dysmenorrhea.       ROS:  GENERAL: No weight changes. No swelling. No fatigue. No fever.  CARDIOVASCULAR: No chest pain. No shortness of breath. No leg cramps.   NEUROLOGICAL: No headaches. No vision changes.  BREASTS: No pain. No lumps. No discharge.  ABDOMEN: No pain. No nausea. No vomiting. No diarrhea. No constipation.  REPRODUCTIVE: No abnormal bleeding.   VULVA: No pain. No lesions. No itching.  VAGINA: No relaxation. No itching. No odor. No discharge. No lesions.  URINARY: No incontinence. No nocturia. No frequency. No dysuria.    /88   Ht 5' 4" (1.626 m)   Wt 104.8 kg (231 lb)   LMP 03/16/2019   BMI 39.65 kg/m²     PE:  APPEARANCE: Well nourished, well developed, in no acute distress.  AFFECT: WNL, alert and oriented x 3.  SKIN: No acne or hirsutism.  NECK: Neck symmetric, without masses or thyromegaly.  NODES: No inguinal, cervical, axillary or femoral lymph node enlargement.  CHEST: Good respiratory effort.   ABDOMEN: Soft. No tenderness or masses.  BREASTS: Symmetrical, no skin changes, visible lesions, palpable masses or nipple discharge bilaterally.  PELVIC: External female genitalia without lesions.  Female hair distribution. Adequate perineal body, Normal urethral meatus. Vagina moist and well rugated without lesions or discharge.  No significant " cystocele or rectocele present. Cervix pink without lesions, discharge or tenderness. Uterus is 18 WEEKS week size, regular, mobile and nontender. Adnexa without masses or tenderness.  EXTREMITIES: No edema    DIAGNOSIS:  1. Uterine leiomyoma, unspecified location    2. Abnormal uterine bleeding (AUB)        PLAN:       COUNSELING:  The patient was counseled today on:  -A.C.S. Pap and pelvic exam guidelines (pap every 3 years);  -to follow up with her PCP for other health maintenance.    FOLLOW-UP PREOP VISIT

## 2019-04-16 NOTE — PROGRESS NOTES
HISTORY OF PRESENT ILLNESS:    Richard Perales is a 33 y.o. female, No obstetric history on file., Patient's last menstrual period was 03/16/2019., HERE TODAY FROM [facility], REF BY NP TO DISCUSS OPTIONS RE FIBROID TX.  HEAVY MENSES MONTHLY, LAST UP TO 2 WEEKS.  HAS SOME ANEMIA WITH H/H 9.5/30, AND IN PAST TOOK FE - ADVISED TO RESTART.  HAD NL PAP 2/6/2019; TRICH TREATED AND NEGATIVE SHAYY.  IN PAST TREATED WITH P-ONLY PILL WITHOUT SIGNIF IMPROVEMENT.  PELVIC USG WITH ENLARGED UTERUS, MULT FIBROIDS, LARGEST 6.9 CM; ON EXAM 18 WEEKS SIZED UTERUS AND PELVIC USG HERE TO HELP DTMN POSITION OF THE FIBROIDS WITHIN THE UTERINE WALL.  IS INTERESTED IN FUTURE FERTILITY AND UNDERSTANDS THAT THE SOONER THE BETTER, YUAN IF PRIOR TO AGE 34YO.  DISCUSSED OPTIONS FOR MGMT - HYSTEROSCOPIC MYOMECTOMY, OPEN MYOMECTOMY, [place] - WITH THEIR RELATIVE EFFECTS ON A FUTURE PREGNANCY.  PLANS MYOMECTOMY  WOULD LIKELY BENEFIT FROM PRE-PREGNANCY VISIT WITH DR BARRETO/ PERINATOLOGY RE HER LEUKODYSTROPHY  4/16/2019 PELVIC USG:  FINDINGS:  Uterus:  Size: Enlarged measuring approximately 15.7 x 6.8 x 7.3 cm uterus has a heterogeneous echotexture  Masses: Multiple masses likely fibroids with the 3 largest measuring 6.1 x 6.1 x 7.8 cm near the fundus, 4.7 x 4.1 x 4.1 cm on the right near the fundus and 4.5 x 4.6 x 4.1 cm in the posterior body.  Endometrium: Obscured  Right ovary:  Size: 3.9 x 3.9 x 2.1 cm  Appearance: Anechoic area measuring 2.9 x 2.4 x 2.4 cm with posterior through transmission suggestive of a simple cyst.  Vascular flow: Normal.  Left ovary:  Not visualized  Free Fluid:  None.  There are nabothian cysts.      Impression     Enlarged uterus on the basis of multiple uterine fibroids which are most likely intramural.  The fibroids obscure the endometrium.  Anechoic area in the right ovary likely a simple cyst..       Past Medical History:   Diagnosis Date    Depression     Leukodystrophy     Neurologic disorder        History  reviewed. No pertinent surgical history.    MEDICATIONS AND ALLERGIES:      Current Outpatient Medications:     acetaminophen (TYLENOL) 325 MG tablet, Take 2 tablets (650 mg total) by mouth every 6 (six) hours., Disp: , Rfl: 0    baclofen (LIORESAL) 20 MG tablet, Take 1 tablet (20 mg total) by mouth 3 (three) times daily., Disp: 90 tablet, Rfl: 5    multivitamin (THERAGRAN) tablet, Take 1 tablet by mouth once daily., Disp: , Rfl:     paroxetine (PAXIL) 20 MG tablet, Take 1 tablet (20 mg total) by mouth every morning., Disp: 30 tablet, Rfl: 11    traZODone (DESYREL) 50 MG tablet, Take 1 tablet (50 mg total) by mouth every evening., Disp: 30 tablet, Rfl: 6    Review of patient's allergies indicates:  No Known Allergies    Family History   Problem Relation Age of Onset    Other Other         in a wheelchair for unkown reasons to patient        Social History     Socioeconomic History    Marital status: Single     Spouse name: Not on file    Number of children: Not on file    Years of education: Not on file    Highest education level: Not on file   Occupational History    Not on file   Social Needs    Financial resource strain: Not on file    Food insecurity:     Worry: Not on file     Inability: Not on file    Transportation needs:     Medical: Not on file     Non-medical: Not on file   Tobacco Use    Smoking status: Never Smoker   Substance and Sexual Activity    Alcohol use: Yes     Comment: rarely     Drug use: Never    Sexual activity: Not Currently   Lifestyle    Physical activity:     Days per week: Not on file     Minutes per session: Not on file    Stress: Not on file   Relationships    Social connections:     Talks on phone: Not on file     Gets together: Not on file     Attends Yazidism service: Not on file     Active member of club or organization: Not on file     Attends meetings of clubs or organizations: Not on file     Relationship status: Not on file   Other Topics Concern    Not  "on file   Social History Narrative    Not on file       OB HISTORY:    COMPREHENSIVE GYN HISTORY:  PAP History: Denies abnormal Paps.  Infection History: Denies STDs. Denies PID.  Benign History: Denies uterine fibroids. Denies ovarian cysts. Denies endometriosis. Denies other conditions.  Cancer History: Denies cervical cancer. Denies uterine cancer or hyperplasia. Denies ovarian cancer. Denies vulvar cancer or pre-cancer. Denies vaginal cancer or pre-cancer. Denies breast cancer. Denies colon cancer.  Sexual Activity History: Reports currently being sexually active  Menstrual History: Monthly. AS ABOVE.   Dysmenorrhea History: Reports mild dysmenorrhea.       ROS:  GENERAL: No weight changes. No swelling. No fatigue. No fever.  CARDIOVASCULAR: No chest pain. No shortness of breath. No leg cramps.   NEUROLOGICAL: No headaches. No vision changes.  BREASTS: No pain. No lumps. No discharge.  ABDOMEN: No pain. No nausea. No vomiting. No diarrhea. No constipation.  REPRODUCTIVE: No abnormal bleeding.   VULVA: No pain. No lesions. No itching.  VAGINA: No relaxation. No itching. No odor. No discharge. No lesions.  URINARY: No incontinence. No nocturia. No frequency. No dysuria.    /88   Ht 5' 4" (1.626 m)   Wt 104.8 kg (231 lb)   LMP 03/16/2019   BMI 39.65 kg/m²     PE:  APPEARANCE: Well nourished, well developed, in no acute distress.  AFFECT: WNL, alert and oriented x 3.  SKIN: No acne or hirsutism.  NECK: Neck symmetric, without masses or thyromegaly.  NODES: No inguinal, cervical, axillary or femoral lymph node enlargement.  CHEST: Good respiratory effort.   ABDOMEN: Soft. No tenderness or masses.  BREASTS: Symmetrical, no skin changes, visible lesions, palpable masses or nipple discharge bilaterally.  PELVIC: External female genitalia without lesions.  Female hair distribution. Adequate perineal body, Normal urethral meatus. Vagina moist and well rugated without lesions or discharge.  No significant " cystocele or rectocele present. Cervix pink without lesions, discharge or tenderness. Uterus is 18 WEEKS week size, regular, mobile and nontender. Adnexa without masses or tenderness.  EXTREMITIES: No edema    DIAGNOSIS:  1. Uterine leiomyoma, unspecified location    2. Abnormal uterine bleeding (AUB)        PLAN:       COUNSELING:  The patient was counseled today on:  -A.C.S. Pap and pelvic exam guidelines (pap every 3 years);  -to follow up with her PCP for other health maintenance.    FOLLOW-UP PREOP VISIT

## 2019-04-20 ENCOUNTER — PATIENT MESSAGE (OUTPATIENT)
Dept: OBSTETRICS AND GYNECOLOGY | Facility: HOSPITAL | Age: 34
End: 2019-04-20

## 2019-04-21 ENCOUNTER — PATIENT MESSAGE (OUTPATIENT)
Dept: NEUROLOGY | Facility: CLINIC | Age: 34
End: 2019-04-21

## 2019-04-21 NOTE — TELEPHONE ENCOUNTER
MESSAGE TO PATIENT:  Your ultrasound shows that all three of your largest fibroids are in locations where they push into and distort the uterine lining.  I don't think that we can shave them down enough from the inside of the uterus to improve your heavy periods.  A uterine artery embolization might shrink the fibroids and improve your bleeding, but it's really not recommended for patients who want to become pregnant in the future.  Although it will require an incision similar to the one used for a , I think the best course of action is to take the fibroids out abdominally.      If you agree and feel comfortable planning a myomectomy to remove the large fibroids that you have, we can begin to make plans and pick a Thursday morning that works for you.  We'd meet up one more time to answer any questions that you might have an sign consents.  You'd also meet with our anesthesiologists in preparation for the procedure. We'll contact you to start the process but please contact us if you get this message and have questions in the meantime.

## 2019-04-23 ENCOUNTER — PATIENT MESSAGE (OUTPATIENT)
Dept: OBSTETRICS AND GYNECOLOGY | Facility: CLINIC | Age: 34
End: 2019-04-23

## 2019-04-23 DIAGNOSIS — D25.9 UTERINE LEIOMYOMA, UNSPECIFIED LOCATION: Primary | ICD-10-CM

## 2019-04-23 DIAGNOSIS — D25.9 UTERINE FIBROID: ICD-10-CM

## 2019-05-03 ENCOUNTER — OFFICE VISIT (OUTPATIENT)
Dept: NEUROLOGY | Facility: CLINIC | Age: 34
End: 2019-05-03
Payer: MEDICARE

## 2019-05-03 ENCOUNTER — ANESTHESIA EVENT (OUTPATIENT)
Dept: SURGERY | Facility: OTHER | Age: 34
DRG: 742 | End: 2019-05-03
Payer: MEDICARE

## 2019-05-03 ENCOUNTER — HOSPITAL ENCOUNTER (OUTPATIENT)
Dept: PREADMISSION TESTING | Facility: OTHER | Age: 34
Discharge: HOME | End: 2019-05-03
Attending: OBSTETRICS & GYNECOLOGY
Payer: MEDICARE

## 2019-05-03 VITALS
TEMPERATURE: 99 F | WEIGHT: 237 LBS | SYSTOLIC BLOOD PRESSURE: 131 MMHG | HEIGHT: 64 IN | OXYGEN SATURATION: 98 % | DIASTOLIC BLOOD PRESSURE: 88 MMHG | HEART RATE: 84 BPM | BODY MASS INDEX: 40.46 KG/M2

## 2019-05-03 VITALS
DIASTOLIC BLOOD PRESSURE: 75 MMHG | SYSTOLIC BLOOD PRESSURE: 125 MMHG | HEIGHT: 64 IN | HEART RATE: 96 BPM | BODY MASS INDEX: 39.26 KG/M2 | WEIGHT: 229.94 LBS

## 2019-05-03 DIAGNOSIS — D25.9 UTERINE LEIOMYOMA, UNSPECIFIED LOCATION: ICD-10-CM

## 2019-05-03 DIAGNOSIS — G31.80 LEUKODYSTROPHY: Primary | Chronic | ICD-10-CM

## 2019-05-03 LAB
ABO + RH BLD: NORMAL
ANION GAP SERPL CALC-SCNC: 9 MMOL/L (ref 8–16)
ANISOCYTOSIS BLD QL SMEAR: ABNORMAL
BASOPHILS # BLD AUTO: 0.04 K/UL (ref 0–0.2)
BASOPHILS NFR BLD: 0.4 % (ref 0–1.9)
BLD GP AB SCN CELLS X3 SERPL QL: NORMAL
BUN SERPL-MCNC: 5 MG/DL (ref 6–20)
CALCIUM SERPL-MCNC: 9 MG/DL (ref 8.7–10.5)
CHLORIDE SERPL-SCNC: 103 MMOL/L (ref 95–110)
CO2 SERPL-SCNC: 28 MMOL/L (ref 23–29)
CREAT SERPL-MCNC: 0.8 MG/DL (ref 0.5–1.4)
DIFFERENTIAL METHOD: ABNORMAL
EOSINOPHIL # BLD AUTO: 0.1 K/UL (ref 0–0.5)
EOSINOPHIL NFR BLD: 1.1 % (ref 0–8)
ERYTHROCYTE [DISTWIDTH] IN BLOOD BY AUTOMATED COUNT: 18.9 % (ref 11.5–14.5)
EST. GFR  (AFRICAN AMERICAN): >60 ML/MIN/1.73 M^2
EST. GFR  (NON AFRICAN AMERICAN): >60 ML/MIN/1.73 M^2
GIANT PLATELETS BLD QL SMEAR: PRESENT
GLUCOSE SERPL-MCNC: 87 MG/DL (ref 70–110)
HCT VFR BLD AUTO: 30.6 % (ref 37–48.5)
HGB BLD-MCNC: 9.2 G/DL (ref 12–16)
HYPOCHROMIA BLD QL SMEAR: ABNORMAL
LYMPHOCYTES # BLD AUTO: 2.3 K/UL (ref 1–4.8)
LYMPHOCYTES NFR BLD: 24.4 % (ref 18–48)
MCH RBC QN AUTO: 21.1 PG (ref 27–31)
MCHC RBC AUTO-ENTMCNC: 30.1 G/DL (ref 32–36)
MCV RBC AUTO: 70 FL (ref 82–98)
MONOCYTES # BLD AUTO: 0.7 K/UL (ref 0.3–1)
MONOCYTES NFR BLD: 7.6 % (ref 4–15)
NEUTROPHILS # BLD AUTO: 6.2 K/UL (ref 1.8–7.7)
NEUTROPHILS NFR BLD: 66.5 % (ref 38–73)
OVALOCYTES BLD QL SMEAR: ABNORMAL
PLATELET # BLD AUTO: 511 K/UL (ref 150–350)
PLATELET BLD QL SMEAR: ABNORMAL
PMV BLD AUTO: 8.9 FL (ref 9.2–12.9)
POIKILOCYTOSIS BLD QL SMEAR: SLIGHT
POLYCHROMASIA BLD QL SMEAR: ABNORMAL
POTASSIUM SERPL-SCNC: 2.9 MMOL/L (ref 3.5–5.1)
RBC # BLD AUTO: 4.36 M/UL (ref 4–5.4)
SODIUM SERPL-SCNC: 140 MMOL/L (ref 136–145)
TARGETS BLD QL SMEAR: ABNORMAL
WBC # BLD AUTO: 9.37 K/UL (ref 3.9–12.7)

## 2019-05-03 PROCEDURE — 99213 PR OFFICE/OUTPT VISIT, EST, LEVL III, 20-29 MIN: ICD-10-PCS | Mod: S$GLB,,, | Performed by: PSYCHIATRY & NEUROLOGY

## 2019-05-03 PROCEDURE — 99999 PR PBB SHADOW E&M-EST. PATIENT-LVL III: CPT | Mod: PBBFAC,,, | Performed by: PSYCHIATRY & NEUROLOGY

## 2019-05-03 PROCEDURE — 99999 PR PBB SHADOW E&M-EST. PATIENT-LVL III: ICD-10-PCS | Mod: PBBFAC,,, | Performed by: PSYCHIATRY & NEUROLOGY

## 2019-05-03 PROCEDURE — 85025 COMPLETE CBC W/AUTO DIFF WBC: CPT

## 2019-05-03 PROCEDURE — 3008F PR BODY MASS INDEX (BMI) DOCUMENTED: ICD-10-PCS | Mod: CPTII,S$GLB,, | Performed by: PSYCHIATRY & NEUROLOGY

## 2019-05-03 PROCEDURE — 99213 OFFICE O/P EST LOW 20 MIN: CPT | Mod: S$GLB,,, | Performed by: PSYCHIATRY & NEUROLOGY

## 2019-05-03 PROCEDURE — 86901 BLOOD TYPING SEROLOGIC RH(D): CPT

## 2019-05-03 PROCEDURE — 80048 BASIC METABOLIC PNL TOTAL CA: CPT

## 2019-05-03 PROCEDURE — 3008F BODY MASS INDEX DOCD: CPT | Mod: CPTII,S$GLB,, | Performed by: PSYCHIATRY & NEUROLOGY

## 2019-05-03 PROCEDURE — 36415 COLL VENOUS BLD VENIPUNCTURE: CPT

## 2019-05-03 RX ORDER — LIDOCAINE HYDROCHLORIDE 10 MG/ML
0.5 INJECTION, SOLUTION EPIDURAL; INFILTRATION; INTRACAUDAL; PERINEURAL ONCE
Status: CANCELLED | OUTPATIENT
Start: 2019-05-03 | End: 2019-05-03

## 2019-05-03 RX ORDER — CELECOXIB 200 MG/1
400 CAPSULE ORAL
Status: CANCELLED | OUTPATIENT
Start: 2019-05-03 | End: 2019-05-03

## 2019-05-03 RX ORDER — ACETAMINOPHEN 500 MG
1000 TABLET ORAL
Status: CANCELLED | OUTPATIENT
Start: 2019-05-03 | End: 2019-05-03

## 2019-05-03 RX ORDER — NORETHINDRONE 0.35 MG/1
TABLET ORAL
Refills: 11 | COMMUNITY
Start: 2019-04-09

## 2019-05-03 RX ORDER — FAMOTIDINE 20 MG/1
20 TABLET, FILM COATED ORAL
Status: CANCELLED | OUTPATIENT
Start: 2019-05-03 | End: 2019-05-03

## 2019-05-03 RX ORDER — SODIUM CHLORIDE, SODIUM LACTATE, POTASSIUM CHLORIDE, CALCIUM CHLORIDE 600; 310; 30; 20 MG/100ML; MG/100ML; MG/100ML; MG/100ML
INJECTION, SOLUTION INTRAVENOUS CONTINUOUS
Status: CANCELLED | OUTPATIENT
Start: 2019-05-03

## 2019-05-03 RX ORDER — PREGABALIN 75 MG/1
75 CAPSULE ORAL
Status: CANCELLED | OUTPATIENT
Start: 2019-05-03 | End: 2019-05-03

## 2019-05-03 RX ORDER — MIDAZOLAM HYDROCHLORIDE 5 MG/ML
5 INJECTION INTRAMUSCULAR; INTRAVENOUS
Status: CANCELLED | OUTPATIENT
Start: 2019-05-03 | End: 2019-05-03

## 2019-05-03 NOTE — PRE ADMISSION SCREENING
Patient did not return with her urine sample as instructed.  Currently in appt at [facility] and still unable to void.  Message sent to Dr. Ibarra's office.

## 2019-05-03 NOTE — PROGRESS NOTES
"Name: Richard Perales  MRN: [id number]   CSN: [id number]      Date: 05/08/2019    Chief Complaint / Interval History:   - getting fibroids surgery next week  - doing well otherwise, no new complaints  - no falls  - strength is good  - doing sudoku for memory games  - bladder ok otherwise  - bowels ok  - no sex drive  - generalized anxiety      From OCt 2018:  - having more "panic attacks", waking up with chest tightness  - more confused now, but has been waxing and waning since 6 months  - never got her MRI done then, she is not sure why  - more urinary frequency, malodorous for 2 weeks  - legs are more wobbly  - says her ophthalmologist described a "blood clot" behind her right eye  - more shaky hands B    From March 2018:  - fell in December, was coming up steps, legs gave out and "werent' there anymore", cut her face  - more slurred speech in general since, using a walker  - no HCT or imaging since fall --> and last MRI was one year ago  - thinking is a little spacey  - WORRIED ABOUT PROGRESSION AGAIN, MORE DISABLED    From Dec 2017:  - still living with mom  - stayed in [place] on her trip -   - still with mildly slurred speech, loses training of thought  - walking and balance are pretty good  - no falls  - still doing her PT exercises  - has back pain wrapping around her, not traveling into legs  - has been a bit tighter since the LP?    From August 2017:  - has continued to improve in general  - high dose steroids in April and has continued with outpatient PT - amazing recovery  - flying to [place] today, going to visit, flying alone  - still living with mom, she is pleased with her recovery as well    From May with Dr. Hayden-Fee:   - Readmitted this past April for difficulty walking, received high dose steroids which helped her to feel much better   - Patient states overall she is feeling good, doing therapy  - Nothing has happened with the referrals to outside neurology, , " "rheumatology   - No PCP at this time but patient with number to call to get PCP   - Shaking in legs better   - Now having pain in knees     History of Present Illness (HPI):  29 yo W with 6 months progressive neuro decline, starting with vision changes in October 2015 and culminating to non-verbal, paraplegic. History per outside chart and family. No prior medical issues except for shingles last year. Complained of vision changes in October and colleagues noticed slurring of speech in December. Started having problems with gait in January and taken to hospital by family when she could not get out of bed. Seen in [place] and apparently had "extensive" neurologic and rheumatologic evaluations (records sent are slim), transferred here as neurologist had nothing more to offer."    Past Medical History: The patient  has a past medical history of Depression, Leukodystrophy, and Neurologic disorder.    Social History: The patient  reports that she has been smoking.  She has never used smokeless tobacco. She reports that she drinks alcohol. She reports that she does not use drugs. used to drink frequently, 3-4 drinks of wine coolers, no smoking or drugs.    Family History: Their family history includes Other in her other. Maternal great uncle was wheelchair bound for unknown reasons to patient. Patient is an only child. No other known neurologic disease in family.     Allergies: Patient has no known allergies.     Meds:   Current Outpatient Medications on File Prior to Visit   Medication Sig Dispense Refill    acetaminophen (TYLENOL) 325 MG tablet Take 2 tablets (650 mg total) by mouth every 6 (six) hours.  0    baclofen (LIORESAL) 20 MG tablet Take 1 tablet (20 mg total) by mouth 3 (three) times daily. 90 tablet 5    LISET 0.35 mg tablet TAKE 1 TABLET DAILY  SOME ANTIBIOTICS MAY REDUCE THE EFFECTIVENESS OF THIS MEDICATION  11    multivitamin (THERAGRAN) tablet Take 1 tablet by mouth once daily.      paroxetine " "(PAXIL) 20 MG tablet Take 1 tablet (20 mg total) by mouth every morning. 30 tablet 11    traZODone (DESYREL) 50 MG tablet Take 1 tablet (50 mg total) by mouth every evening. 30 tablet 6    UNABLE TO FIND 1 tablet once daily. OTC - [place] potassium supplement  99 mg       No current facility-administered medications on file prior to visit.        Exam:  /75   Pulse 96   Ht 5' 4" (1.626 m)   Wt 104.3 kg (229 lb 15 oz)   BMI 39.47 kg/m²       Constitutional  Well-developed, well-nourished, appears stated age - moderate obesity   Cardiovascular  Radial pulses 2+ and symmetric, no LE edema bilaterally   Neurological    * Mental status       - Orientation  Drowsier than usual, but oriented to person, place, time, and situation     - Memory   Not formally tested     - Attention/concentration  Inattentive, tangential in conversation     - Language  Naming & repetition intact, mild paraphasis errors and less prosody than usual     - Fund of knowledge  Aware of current events, but limited discussion     - Executive  Moderatly-organized thoughts     - Other     * Cranial nerves       - CN II  PERRL, visual fields full to confrontation     - CN III, IV, VI  Extraocular movements full, still with B hypermetric saccades, + overshoots the midline     - CN V  Sensation V1 - V3 intact     - CN VII  Mild R dim NLF - stable     - CN VIII  Hearing intact bilaterally     - CN IX, X  Palate raises midline and symmetric     - CN XI  SCM and trapezius 5/5 bilaterally     - CN XII  Tongue midline   * Motor  5/5 strength throughout    * Sensory   Intact to temperature and vibration throughout   * Coordination  No dysmetria on FNF and HTS   + slight lack of check of limbs B   * Gait  Slow to arise, narrow-based (knock-knees) but can tandem wlak   * Deep tendon reflexes  3+ and symmetric throughout     Laboratory/Radiological:  - Results:  Hospital Outpatient Visit on 05/03/2019   Component Date Value Ref Range Status    " WBC 05/03/2019 9.37  3.90 - 12.70 K/uL Final    RBC 05/03/2019 4.36  4.00 - 5.40 M/uL Final    Hemoglobin 05/03/2019 9.2* 12.0 - 16.0 g/dL Final    Hematocrit 05/03/2019 30.6* 37.0 - 48.5 % Final    Mean Corpuscular Volume 05/03/2019 70* 82 - 98 fL Final    Mean Corpuscular Hemoglobin 05/03/2019 21.1* 27.0 - 31.0 pg Final    Mean Corpuscular Hemoglobin Conc 05/03/2019 30.1* 32.0 - 36.0 g/dL Final    RDW 05/03/2019 18.9* 11.5 - 14.5 % Final    Platelets 05/03/2019 511* 150 - 350 K/uL Final    MPV 05/03/2019 8.9* 9.2 - 12.9 fL Final    Gran # (ANC) 05/03/2019 6.2  1.8 - 7.7 K/uL Final    Lymph # 05/03/2019 2.3  1.0 - 4.8 K/uL Final    Mono # 05/03/2019 0.7  0.3 - 1.0 K/uL Final    Eos # 05/03/2019 0.1  0.0 - 0.5 K/uL Final    Baso # 05/03/2019 0.04  0.00 - 0.20 K/uL Final    Gran% 05/03/2019 66.5  38.0 - 73.0 % Final    Lymph% 05/03/2019 24.4  18.0 - 48.0 % Final    Mono% 05/03/2019 7.6  4.0 - 15.0 % Final    Eosinophil% 05/03/2019 1.1  0.0 - 8.0 % Final    Basophil% 05/03/2019 0.4  0.0 - 1.9 % Final    Platelet Estimate 05/03/2019 Increased*  Final    Aniso 05/03/2019 Moderate   Final    Poik 05/03/2019 Slight   Final    Poly 05/03/2019 Occasional   Final    Hypo 05/03/2019 Moderate   Final    Ovalocytes 05/03/2019 Occasional   Final    Target Cells 05/03/2019 Occasional   Final    Large/Giant Platelets 05/03/2019 Present   Final    Differential Method 05/03/2019 Automated   Final    Group & Rh 05/03/2019 A POS   Final    Indirect Jens 05/03/2019 NEG   Final    Sodium 05/03/2019 140  136 - 145 mmol/L Final    Potassium 05/03/2019 2.9* 3.5 - 5.1 mmol/L Final    Chloride 05/03/2019 103  95 - 110 mmol/L Final    CO2 05/03/2019 28  23 - 29 mmol/L Final    Glucose 05/03/2019 87  70 - 110 mg/dL Final    BUN, Bld 05/03/2019 5* 6 - 20 mg/dL Final    Creatinine 05/03/2019 0.8  0.5 - 1.4 mg/dL Final    Calcium 05/03/2019 9.0  8.7 - 10.5 mg/dL Final    Anion Gap 05/03/2019 9  8 - 16  mmol/L Final    eGFR if African American 05/03/2019 >60  >60 mL/min/1.73 m^2 Final    eGFR if non African American 05/03/2019 >60  >60 mL/min/1.73 m^2 Final     Long Chain Fatty Acids:    Ref. Range 3/3/2016 13:01 3/3/2016 13:02   C22:0 Latest Ref Range: <=96.3 nmol/mL 59.0     C24:0 Latest Ref Range: <=91.4 nmol/mL 37.9     C26:0 Latest Ref Range: <=1.30 nmol/mL 0.25     C24:0/C22:0 Latest Ref Range: <=1.39 ratio 0.64     C26:0/C22:0 Latest Ref Range: <=0.023 ratio 0.004     Pristanic Acid Latest Ref Range: <=2.98 nmol/mL 0.04     Phytanic Acid Latest Ref Range: <=9.88 nmol/mL 0.19     Pristanic/Phytanic Latest Ref Range: <=0.39 ratio 0.21     Long Chain Fatty Acids Unknown SEE BELOW     Acetylcarnitine Latest Ref Range: 5-29 umol/L   16      Arylsulfatase A,             108           nmol/h/mg  >=62   Leukocytes   Interpretation                                                 In this sample, arylsulfatase A activity is not reduced. This result indicates that this individual is not affected with either metachromatic leukodystrophy (MLD) or multiple sulfatase deficiency. If the clinical presentation is suggestive of MLD, consider urine sulfatide analysis (MML test CTSA) which is expected to be abnormal in patients with saposin B deficiency due to mutations in the prosaposin gene.      Alpha-Galactosidase, Leukocytes   Reason for Referral        Not provided   -------------------ADDITIONAL INFORMATION-------------------   Fluorometric Enzyme Assay   Alpha-Galactosidase,       66.9          nmol/h/mg  >=23.1   Leukocytes   In this specimen, the activity of alpha-galactosidase is normal, indicating this patient is most likely NOT a carrier for Fabry disease (OMIM 351106). However, please note that enzyme analysis is, in general, not sufficiently sensitive to detect all carrier females (see Rashaad WL et al. Hum Mutat 2009; 30: 1-9; and Patricia MENDEZ et al. Mol Janae Metab. 2008; 93: 112-128). Molecular genetic analysis of  the GLA gene (MML test FABRZ/Fabry Disease, Full Gene Analysis) is the most reliable method of detecting carriers. If not already ordered, this could be done on the existing sample by calling MML within one month.      Ceramide Trihex and Sulfatide, U   In this sample, the pattern of elevated excretions of ceramide trihexoside species is suggestive of a biochemical diagnosis of Fabry disease (OMIM 533004). Please contact the Biochemical Genetics consultant or genetic counselor on call ([phone] if you have any questions.      Other Labs: abnormal bolded  Carnitine Free 35  Carnitine Plasma 51  Pyruvic acid 0.047  HIV pcr negative  Ceruloplasmin 31  Copper, serum  Aldolase 12.8 (H)  Ammonia 34  ACE 29  Anti-thyroglobulin ab <4.0  Thiamine 28 (L)  Vitamin B6 20  Vitamin B2 3  Vitamin B3 2.31  Folate 12.8  NMO negative        OUTSIDE CSF (2/12/16):  WCB 8  CBC 32  Gluc 71  Prot 19  CMV neg  HSV1 igg 0.06  HSV2 igG 0.63  Serum hiv neg (unknown what specific test)    - Independent review of images:            MRI Brain 3/29/17:                  Diagnoses:          Leukodystrophy - has been steroid responsive and has been holding on no immunosuppressants.  Could this have been a CLIPPERS (chronic lymphocytic inflammation with pontine perivascular enhancement responsive to steroids) - though never very enhancing...    She has worsened gradually over 6 months, and now more acutely over the last several weeks.  Not entirely clear on time frame.  Did not follow-up with testing after last visit.  Has symptoms that may be referable to a UTI now.  Also panic attacks that might be part of her neuropsychiatric symptoms versus some cervicothoracic lesion?    Medical Decision Making:  - stable, no new med changes for neuro status

## 2019-05-03 NOTE — DISCHARGE INSTRUCTIONS
PRE-ADMIT TESTING -  [phone]    [address]  [place] [facility]          Your surgery has been scheduled at [facility]. We are pleased to have the opportunity to serve you. For Further Information please call [phone].    On the day of surgery please report to the Information Desk on the 1st floor.    · CONTACT YOUR PHYSICIAN'S OFFICE THE DAY PRIOR TO YOUR SURGERY TO OBTAIN YOUR ARRIVAL TIME.     · The evening before surgery do not eat anything after 9 p.m. ( this includes hard candy, chewing gum and mints).  You may only have GATORADE, POWERADE AND WATER  from 9 p.m. until you leave your home.   DO NOT DRINK ANY LIQUIDS ON THE WAY TO THE HOSPITAL.      SPECIAL MEDICATION INSTRUCTIONS: TAKE medications checked off by the Anesthesiologist on your Medication List.    Angiogram Patients: Take medications as instructed by your physician, including aspirin.     Surgery Patients:    If you take ASPIRIN - Your PHYSICIAN/SURGEON will need to inform you IF/OR when you need to stop taking aspirin prior to your surgery.     Do Not take any medications containing IBUPROFEN.  Do Not Wear any make-up or dark nail polish   (especially eye make-up) to surgery. If you come to surgery with makeup on you will be required to remove the makeup or nail polish.    Do not shave your surgical area at least 5 days prior to your surgery. The surgical prep will be performed at the hospital according to Infection Control regulations.    Leave all valuables at home.   Do Not wear any jewelry or watches, including any metal in body piercings. Jewelry must be removed prior to coming to the hospital.  There is a possibility that rings that are unable to be removed may be cut off if they are on the surgical extremity.    Contact Lens must be removed before surgery. Either do not wear the contact lens or bring a case and solution for storage.  Please bring a container for eyeglasses or dentures as required.  Bring  any paperwork your physician has provided, such as consent forms,  history and physicals, doctor's orders, etc.   Bring comfortable clothes that are loose fitting to wear upon discharge. Take into consideration the type of surgery being performed.  Maintain your diet as advised per your physician the day prior to surgery.      Adequate rest the night before surgery is advised.   Park in the Parking lot behind the hospital or in the [facility] across the street from the parking lot. Parking is complimentary.  If you will be discharged the same day as your procedure, please arrange for a responsible adult to drive you home or to accompany you if traveling by taxi.   YOU WILL NOT BE PERMITTED TO DRIVE OR TO LEAVE THE HOSPITAL ALONE AFTER SURGERY.   It is strongly recommended that you arrange for someone to remain with you for the first 24 hrs following your surgery.       Thank you for your cooperation.  The Staff of [facility].                Bathing Instructions with Hibiclens     Shower the evening before and morning of your procedure with Hibiclens:   Wash your face with water and your regular face wash/soap   Apply Hibiclens directly on your skin or on a wet washcloth and wash gently. When showering: Move away from the shower stream when applying Hibiclens to avoid rinsing off too soon.   Rinse thoroughly with warm water   Do not dilute Hibiclens         Dry off as usual, do not use any deodorant, powder, body lotions, perfume, after shave or cologne.

## 2019-05-03 NOTE — ANESTHESIA PREPROCEDURE EVALUATION
05/03/2019  Richard Perales is a 33 y.o., female.    Anesthesia Evaluation    I have reviewed the Patient Summary Reports.    I have reviewed the Nursing Notes.   I have reviewed the Medications.     Review of Systems  Anesthesia Hx:  No previous Anesthesia    Hematology/Oncology:     Oncology Normal    -- Anemia:   EENT/Dental:EENT/Dental Normal   Cardiovascular:  Cardiovascular Normal Exercise tolerance: good     Pulmonary:  Pulmonary Normal    Renal/:   Chronic Renal Disease Renal tubular acidosis.   Hepatic/GI:  Hepatic/GI Normal    Musculoskeletal:   H/O left leg swelling. Has generalized muscle spasms.   Neurological:   White matter abnormality on MRI. Leukodystrophy.   Dermatological:  Skin Normal    Psych:   Psychiatric History depression          Physical Exam  General:  Morbid Obesity    Airway/Jaw/Neck:  Airway Findings: Mouth Opening: Normal Tongue: Normal  General Airway Assessment: Adult, Average  Mallampati: II  TM Distance: Normal, at least 6 cm  Jaw/Neck Findings:  Neck ROM: Normal ROM      Dental:  Dental Findings: In tact        Mental Status:  Mental Status Findings:  Cooperative, Alert and Oriented         Anesthesia Plan  Type of Anesthesia, risks & benefits discussed:  Anesthesia Type:  general  Patient's Preference:   Intra-op Monitoring Plan: standard ASA monitors  Intra-op Monitoring Plan Comments:   Post Op Pain Control Plan: per primary service following discharge from PACU  Post Op Pain Control Plan Comments:   Induction:    Beta Blocker:         Informed Consent: Patient understands risks and agrees with Anesthesia plan.  Questions answered. Anesthesia consent signed with patient.  ASA Score: 3     Day of Surgery Review of History & Physical:    H&P update referred to the surgeon.     Anesthesia Plan Notes: CBC, BMP, and T&S today.        Ready For Surgery From  Anesthesia Perspective.

## 2019-05-06 NOTE — PRE ADMISSION SCREENING
Spoke with Jeannie Gutiérrez MA, in Dr. Ibarra's office regarding urine cx not obtained secondary to patient unable to void.  Also, potassium 2.9 and H/H 9.2/30.6.  Informed that anesthesia, Dr. Echeverria, will order K level morning of surgery and we will instruct patient on food sources high in potassium and to start an OTC iron supplement.  Jeannie states Dr. Ibarra does not need urine culture and okay to instruct on potassium intake and iron supplement.

## 2019-05-06 NOTE — PRE ADMISSION SCREENING
Spoke with Ms Perales regarding foods high in potassium content that she should consume prior to surgery (green leafy vegetable, bananas, orange juice, avocado, yogurt)  Pt states she has an OTC potassium supplement she has not been taking but will start.  Also informed patient to take OTC iron supplement, ferrous gluconate.  Pt states she will.

## 2019-05-07 ENCOUNTER — TELEPHONE (OUTPATIENT)
Dept: OBSTETRICS AND GYNECOLOGY | Facility: CLINIC | Age: 34
End: 2019-05-07

## 2019-05-07 NOTE — TELEPHONE ENCOUNTER
Regarding: RE:Reminder for Upcoming Procedure  Contact: [phone]  ----- Message from [facility], System Message sent at 5/6/2019  2:58 PM CDT -----    I'm writing to inquire about the time of procedure only because I live in [place] and I need to make arrangements to get there.  ----- Message -----  From: Jennyfer Ibarra MD  Sent: 5/2/2019  8:30 AM CDT  To: Richard Perales  Subject: Reminder for Upcoming Procedure  [facility] [address] [place] [address]    05/02/2019      Dear Richard,      This is a reminder for your upcoming procedure with Jennyfer Ibarra MD on 5/9/2019. We will contact you again before the day of your procedure with your scheduled arrival time.       If you have questions or scheduling concerns, you can contact your physicians office:   Jennyfer Ibarra MD  Phone Number: [phone]      Sincerely,     [facility] [address] [facility] [address]

## 2019-05-07 NOTE — TELEPHONE ENCOUNTER
I spoke to the pt nd she states that she will not be able to make it for a 730 case she wanted to leave it for 10 and she comes in for 8 am . I spoke to the pt and she verbally understood that she comes in for 8 am for her case on Thursday that starts for 10 am.

## 2019-05-07 NOTE — TELEPHONE ENCOUNTER
Regarding: RE:Reminder for Upcoming Procedure  Contact: [phone]  ----- Message from [facility], System Message sent at 5/6/2019  2:58 PM CDT -----    I'm writing to inquire about the time of procedure only because I live in [place] and I need to make arrangements to get there.  ----- Message -----  From: Jennyfer Ibarra MD  Sent: 5/2/2019  8:30 AM CDT  To: Richard Perales  Subject: Reminder for Upcoming Procedure  [facility] [address] [place] [address]    05/02/2019      Dear Richard,      This is a reminder for your upcoming procedure with Jennyfer Ibarra MD on 5/9/2019. We will contact you again before the day of your procedure with your scheduled arrival time.       If you have questions or scheduling concerns, you can contact your physicians office:   Jennyfer Ibarra MD  Phone Number: [phone]      Sincerely,     [facility] [address] [facility] [address]

## 2019-05-08 ENCOUNTER — TELEPHONE (OUTPATIENT)
Dept: OBSTETRICS AND GYNECOLOGY | Facility: CLINIC | Age: 34
End: 2019-05-08

## 2019-05-08 NOTE — TELEPHONE ENCOUNTER
LVM:  Good Morning Ms Perales, this is Jeannie from Dr Ibarra;s office. Please call our office back at [phone].  Regarding you upcomming surgery and consent forms with Dr Ibarra.  Thank You

## 2019-05-09 ENCOUNTER — ANESTHESIA (OUTPATIENT)
Dept: SURGERY | Facility: OTHER | Age: 34
DRG: 742 | End: 2019-05-09
Payer: MEDICARE

## 2019-05-09 ENCOUNTER — HOSPITAL ENCOUNTER (INPATIENT)
Facility: OTHER | Age: 34
LOS: 2 days | Discharge: HOME | DRG: 742 | End: 2019-05-11
Attending: OBSTETRICS & GYNECOLOGY | Admitting: OBSTETRICS & GYNECOLOGY
Payer: MEDICARE

## 2019-05-09 DIAGNOSIS — D25.9 UTERINE LEIOMYOMA, UNSPECIFIED LOCATION: Primary | ICD-10-CM

## 2019-05-09 DIAGNOSIS — Z98.890 S/P MYOMECTOMY: ICD-10-CM

## 2019-05-09 LAB
B-HCG UR QL: NEGATIVE
CTP QC/QA: YES
POCT GLUCOSE: 106 MG/DL (ref 70–110)
POTASSIUM SERPL-SCNC: 3.4 MMOL/L (ref 3.5–5.1)

## 2019-05-09 PROCEDURE — 37000008 HC ANESTHESIA 1ST 15 MINUTES: Performed by: OBSTETRICS & GYNECOLOGY

## 2019-05-09 PROCEDURE — 84132 ASSAY OF SERUM POTASSIUM: CPT

## 2019-05-09 PROCEDURE — 58146 PR MYOMECTOMY 5/>,TOT>250 GMS,ABD APPRCH: ICD-10-PCS | Mod: ,,, | Performed by: OBSTETRICS & GYNECOLOGY

## 2019-05-09 PROCEDURE — 81025 URINE PREGNANCY TEST: CPT | Performed by: SPECIALIST

## 2019-05-09 PROCEDURE — P9045 ALBUMIN (HUMAN), 5%, 250 ML: HCPCS | Mod: JG | Performed by: NURSE ANESTHETIST, CERTIFIED REGISTERED

## 2019-05-09 PROCEDURE — 25000003 PHARM REV CODE 250: Performed by: ANESTHESIOLOGY

## 2019-05-09 PROCEDURE — 25000003 PHARM REV CODE 250: Performed by: NURSE ANESTHETIST, CERTIFIED REGISTERED

## 2019-05-09 PROCEDURE — 63600175 PHARM REV CODE 636 W HCPCS: Performed by: STUDENT IN AN ORGANIZED HEALTH CARE EDUCATION/TRAINING PROGRAM

## 2019-05-09 PROCEDURE — 25000003 PHARM REV CODE 250: Performed by: OBSTETRICS & GYNECOLOGY

## 2019-05-09 PROCEDURE — 88305 TISSUE EXAM BY PATHOLOGIST: CPT | Mod: 26,,, | Performed by: PATHOLOGY

## 2019-05-09 PROCEDURE — 63600175 PHARM REV CODE 636 W HCPCS: Performed by: NURSE ANESTHETIST, CERTIFIED REGISTERED

## 2019-05-09 PROCEDURE — 25000003 PHARM REV CODE 250: Performed by: STUDENT IN AN ORGANIZED HEALTH CARE EDUCATION/TRAINING PROGRAM

## 2019-05-09 PROCEDURE — C9290 INJ, BUPIVACAINE LIPOSOME: HCPCS | Performed by: OBSTETRICS & GYNECOLOGY

## 2019-05-09 PROCEDURE — 82962 GLUCOSE BLOOD TEST: CPT | Performed by: OBSTETRICS & GYNECOLOGY

## 2019-05-09 PROCEDURE — 36000706: Performed by: OBSTETRICS & GYNECOLOGY

## 2019-05-09 PROCEDURE — 25000003 PHARM REV CODE 250: Performed by: SPECIALIST

## 2019-05-09 PROCEDURE — 11000001 HC ACUTE MED/SURG PRIVATE ROOM

## 2019-05-09 PROCEDURE — 37000009 HC ANESTHESIA EA ADD 15 MINS: Performed by: OBSTETRICS & GYNECOLOGY

## 2019-05-09 PROCEDURE — 58146 MYOMECTOMY ABDOM COMPLEX: CPT | Mod: ,,, | Performed by: OBSTETRICS & GYNECOLOGY

## 2019-05-09 PROCEDURE — 71000039 HC RECOVERY, EACH ADD'L HOUR: Performed by: OBSTETRICS & GYNECOLOGY

## 2019-05-09 PROCEDURE — 63600175 PHARM REV CODE 636 W HCPCS: Performed by: OBSTETRICS & GYNECOLOGY

## 2019-05-09 PROCEDURE — 63600175 PHARM REV CODE 636 W HCPCS: Performed by: ANESTHESIOLOGY

## 2019-05-09 PROCEDURE — 88305 TISSUE SPECIMEN TO PATHOLOGY - SURGERY: ICD-10-PCS | Mod: 26,,, | Performed by: PATHOLOGY

## 2019-05-09 PROCEDURE — 36415 COLL VENOUS BLD VENIPUNCTURE: CPT

## 2019-05-09 PROCEDURE — 71000033 HC RECOVERY, INTIAL HOUR: Performed by: OBSTETRICS & GYNECOLOGY

## 2019-05-09 PROCEDURE — 88305 TISSUE EXAM BY PATHOLOGIST: CPT | Performed by: PATHOLOGY

## 2019-05-09 PROCEDURE — 36000707: Performed by: OBSTETRICS & GYNECOLOGY

## 2019-05-09 PROCEDURE — C1765 ADHESION BARRIER: HCPCS | Performed by: OBSTETRICS & GYNECOLOGY

## 2019-05-09 DEVICE — BARRIER INTERCEED 3X4 ST DIS: Type: IMPLANTABLE DEVICE | Site: ABDOMEN | Status: FUNCTIONAL

## 2019-05-09 RX ORDER — IBUPROFEN 600 MG/1
600 TABLET ORAL EVERY 6 HOURS
Status: DISCONTINUED | OUTPATIENT
Start: 2019-05-10 | End: 2019-05-11 | Stop reason: HOSPADM

## 2019-05-09 RX ORDER — MIDAZOLAM HYDROCHLORIDE 5 MG/ML
5 INJECTION INTRAMUSCULAR; INTRAVENOUS
Status: DISCONTINUED | OUTPATIENT
Start: 2019-05-09 | End: 2019-05-09 | Stop reason: HOSPADM

## 2019-05-09 RX ORDER — OXYCODONE HYDROCHLORIDE 5 MG/1
5 TABLET ORAL
Status: DISCONTINUED | OUTPATIENT
Start: 2019-05-09 | End: 2019-05-09 | Stop reason: HOSPADM

## 2019-05-09 RX ORDER — PREGABALIN 75 MG/1
75 CAPSULE ORAL
Status: COMPLETED | OUTPATIENT
Start: 2019-05-09 | End: 2019-05-09

## 2019-05-09 RX ORDER — NEOSTIGMINE METHYLSULFATE 1 MG/ML
INJECTION, SOLUTION INTRAVENOUS
Status: DISCONTINUED | OUTPATIENT
Start: 2019-05-09 | End: 2019-05-09

## 2019-05-09 RX ORDER — ONDANSETRON 2 MG/ML
INJECTION INTRAMUSCULAR; INTRAVENOUS
Status: DISCONTINUED | OUTPATIENT
Start: 2019-05-09 | End: 2019-05-09

## 2019-05-09 RX ORDER — ALBUMIN HUMAN 50 G/1000ML
SOLUTION INTRAVENOUS CONTINUOUS PRN
Status: DISCONTINUED | OUTPATIENT
Start: 2019-05-09 | End: 2019-05-09

## 2019-05-09 RX ORDER — SODIUM CHLORIDE, SODIUM LACTATE, POTASSIUM CHLORIDE, CALCIUM CHLORIDE 600; 310; 30; 20 MG/100ML; MG/100ML; MG/100ML; MG/100ML
INJECTION, SOLUTION INTRAVENOUS CONTINUOUS
Status: DISCONTINUED | OUTPATIENT
Start: 2019-05-09 | End: 2019-05-09

## 2019-05-09 RX ORDER — SODIUM CHLORIDE 0.9 % (FLUSH) 0.9 %
3 SYRINGE (ML) INJECTION
Status: DISCONTINUED | OUTPATIENT
Start: 2019-05-09 | End: 2019-05-09

## 2019-05-09 RX ORDER — LIDOCAINE HCL/PF 100 MG/5ML
SYRINGE (ML) INTRAVENOUS
Status: DISCONTINUED | OUTPATIENT
Start: 2019-05-09 | End: 2019-05-09

## 2019-05-09 RX ORDER — MUPIROCIN 20 MG/G
1 OINTMENT TOPICAL 2 TIMES DAILY
Status: DISCONTINUED | OUTPATIENT
Start: 2019-05-09 | End: 2019-05-11 | Stop reason: HOSPADM

## 2019-05-09 RX ORDER — KETOROLAC TROMETHAMINE 30 MG/ML
30 INJECTION, SOLUTION INTRAMUSCULAR; INTRAVENOUS EVERY 6 HOURS
Status: COMPLETED | OUTPATIENT
Start: 2019-05-09 | End: 2019-05-10

## 2019-05-09 RX ORDER — OXYCODONE AND ACETAMINOPHEN 5; 325 MG/1; MG/1
1 TABLET ORAL EVERY 4 HOURS PRN
Status: DISCONTINUED | OUTPATIENT
Start: 2019-05-09 | End: 2019-05-11 | Stop reason: HOSPADM

## 2019-05-09 RX ORDER — KETOROLAC TROMETHAMINE 30 MG/ML
INJECTION, SOLUTION INTRAMUSCULAR; INTRAVENOUS
Status: DISCONTINUED | OUTPATIENT
Start: 2019-05-09 | End: 2019-05-09

## 2019-05-09 RX ORDER — CELECOXIB 200 MG/1
400 CAPSULE ORAL
Status: COMPLETED | OUTPATIENT
Start: 2019-05-09 | End: 2019-05-09

## 2019-05-09 RX ORDER — FAMOTIDINE 20 MG/1
20 TABLET, FILM COATED ORAL
Status: COMPLETED | OUTPATIENT
Start: 2019-05-09 | End: 2019-05-09

## 2019-05-09 RX ORDER — MIDAZOLAM HYDROCHLORIDE 1 MG/ML
INJECTION INTRAMUSCULAR; INTRAVENOUS
Status: DISCONTINUED | OUTPATIENT
Start: 2019-05-09 | End: 2019-05-09

## 2019-05-09 RX ORDER — ROCURONIUM BROMIDE 10 MG/ML
INJECTION, SOLUTION INTRAVENOUS
Status: DISCONTINUED | OUTPATIENT
Start: 2019-05-09 | End: 2019-05-09

## 2019-05-09 RX ORDER — BISACODYL 10 MG
10 SUPPOSITORY, RECTAL RECTAL DAILY PRN
Status: DISCONTINUED | OUTPATIENT
Start: 2019-05-09 | End: 2019-05-11 | Stop reason: HOSPADM

## 2019-05-09 RX ORDER — GLYCOPYRROLATE 0.2 MG/ML
INJECTION INTRAMUSCULAR; INTRAVENOUS
Status: DISCONTINUED | OUTPATIENT
Start: 2019-05-09 | End: 2019-05-09

## 2019-05-09 RX ORDER — PAROXETINE HYDROCHLORIDE 20 MG/1
20 TABLET, FILM COATED ORAL EVERY MORNING
Status: DISCONTINUED | OUTPATIENT
Start: 2019-05-10 | End: 2019-05-11 | Stop reason: HOSPADM

## 2019-05-09 RX ORDER — LIDOCAINE HYDROCHLORIDE 10 MG/ML
0.5 INJECTION, SOLUTION EPIDURAL; INFILTRATION; INTRACAUDAL; PERINEURAL ONCE
Status: DISCONTINUED | OUTPATIENT
Start: 2019-05-09 | End: 2019-05-09 | Stop reason: HOSPADM

## 2019-05-09 RX ORDER — VASOPRESSIN 20 [USP'U]/ML
INJECTION, SOLUTION INTRAMUSCULAR; SUBCUTANEOUS
Status: DISCONTINUED | OUTPATIENT
Start: 2019-05-09 | End: 2019-05-09 | Stop reason: HOSPADM

## 2019-05-09 RX ORDER — ONDANSETRON 2 MG/ML
4 INJECTION INTRAMUSCULAR; INTRAVENOUS DAILY PRN
Status: DISCONTINUED | OUTPATIENT
Start: 2019-05-09 | End: 2019-05-09 | Stop reason: HOSPADM

## 2019-05-09 RX ORDER — ACETAMINOPHEN 500 MG
1000 TABLET ORAL
Status: COMPLETED | OUTPATIENT
Start: 2019-05-09 | End: 2019-05-09

## 2019-05-09 RX ORDER — DIPHENHYDRAMINE HCL 25 MG
25 CAPSULE ORAL EVERY 4 HOURS PRN
Status: DISCONTINUED | OUTPATIENT
Start: 2019-05-09 | End: 2019-05-11 | Stop reason: HOSPADM

## 2019-05-09 RX ORDER — OXYCODONE AND ACETAMINOPHEN 10; 325 MG/1; MG/1
1 TABLET ORAL EVERY 4 HOURS PRN
Status: DISCONTINUED | OUTPATIENT
Start: 2019-05-09 | End: 2019-05-11 | Stop reason: HOSPADM

## 2019-05-09 RX ORDER — MEPERIDINE HYDROCHLORIDE 25 MG/ML
12.5 INJECTION INTRAMUSCULAR; INTRAVENOUS; SUBCUTANEOUS ONCE AS NEEDED
Status: DISCONTINUED | OUTPATIENT
Start: 2019-05-09 | End: 2019-05-09 | Stop reason: HOSPADM

## 2019-05-09 RX ORDER — SODIUM CHLORIDE, SODIUM LACTATE, POTASSIUM CHLORIDE, CALCIUM CHLORIDE 600; 310; 30; 20 MG/100ML; MG/100ML; MG/100ML; MG/100ML
INJECTION, SOLUTION INTRAVENOUS CONTINUOUS
Status: DISCONTINUED | OUTPATIENT
Start: 2019-05-09 | End: 2019-05-10

## 2019-05-09 RX ORDER — DIPHENHYDRAMINE HYDROCHLORIDE 50 MG/ML
25 INJECTION INTRAMUSCULAR; INTRAVENOUS EVERY 4 HOURS PRN
Status: DISCONTINUED | OUTPATIENT
Start: 2019-05-09 | End: 2019-05-11 | Stop reason: HOSPADM

## 2019-05-09 RX ORDER — HYDROMORPHONE HYDROCHLORIDE 2 MG/ML
0.4 INJECTION, SOLUTION INTRAMUSCULAR; INTRAVENOUS; SUBCUTANEOUS EVERY 5 MIN PRN
Status: DISCONTINUED | OUTPATIENT
Start: 2019-05-09 | End: 2019-05-09 | Stop reason: HOSPADM

## 2019-05-09 RX ORDER — DEXAMETHASONE SODIUM PHOSPHATE 4 MG/ML
INJECTION, SOLUTION INTRA-ARTICULAR; INTRALESIONAL; INTRAMUSCULAR; INTRAVENOUS; SOFT TISSUE
Status: DISCONTINUED | OUTPATIENT
Start: 2019-05-09 | End: 2019-05-09

## 2019-05-09 RX ORDER — HYDROMORPHONE HYDROCHLORIDE 1 MG/ML
1 INJECTION, SOLUTION INTRAMUSCULAR; INTRAVENOUS; SUBCUTANEOUS EVERY 4 HOURS PRN
Status: DISCONTINUED | OUTPATIENT
Start: 2019-05-09 | End: 2019-05-11 | Stop reason: HOSPADM

## 2019-05-09 RX ORDER — FENTANYL CITRATE 50 UG/ML
INJECTION, SOLUTION INTRAMUSCULAR; INTRAVENOUS
Status: DISCONTINUED | OUTPATIENT
Start: 2019-05-09 | End: 2019-05-09

## 2019-05-09 RX ORDER — ONDANSETRON 8 MG/1
8 TABLET, ORALLY DISINTEGRATING ORAL EVERY 8 HOURS PRN
Status: DISCONTINUED | OUTPATIENT
Start: 2019-05-09 | End: 2019-05-11 | Stop reason: HOSPADM

## 2019-05-09 RX ORDER — PROPOFOL 10 MG/ML
VIAL (ML) INTRAVENOUS
Status: DISCONTINUED | OUTPATIENT
Start: 2019-05-09 | End: 2019-05-09

## 2019-05-09 RX ADMIN — ROCURONIUM BROMIDE 10 MG: 10 INJECTION INTRAVENOUS at 10:05

## 2019-05-09 RX ADMIN — HYDROMORPHONE HYDROCHLORIDE 0.4 MG: 2 INJECTION, SOLUTION INTRAMUSCULAR; INTRAVENOUS; SUBCUTANEOUS at 03:05

## 2019-05-09 RX ADMIN — CELECOXIB 400 MG: 200 CAPSULE ORAL at 09:05

## 2019-05-09 RX ADMIN — PREGABALIN 75 MG: 75 CAPSULE ORAL at 09:05

## 2019-05-09 RX ADMIN — ONDANSETRON 4 MG: 2 INJECTION INTRAMUSCULAR; INTRAVENOUS at 10:05

## 2019-05-09 RX ADMIN — FENTANYL CITRATE 100 MCG: 50 INJECTION, SOLUTION INTRAMUSCULAR; INTRAVENOUS at 10:05

## 2019-05-09 RX ADMIN — MIDAZOLAM HYDROCHLORIDE 2 MG: 1 INJECTION, SOLUTION INTRAMUSCULAR; INTRAVENOUS at 10:05

## 2019-05-09 RX ADMIN — ACETAMINOPHEN 1000 MG: 500 TABLET, FILM COATED ORAL at 09:05

## 2019-05-09 RX ADMIN — KETOROLAC TROMETHAMINE 30 MG: 30 INJECTION, SOLUTION INTRAMUSCULAR; INTRAVENOUS at 01:05

## 2019-05-09 RX ADMIN — DEXAMETHASONE SODIUM PHOSPHATE 8 MG: 4 INJECTION, SOLUTION INTRAMUSCULAR; INTRAVENOUS at 10:05

## 2019-05-09 RX ADMIN — KETOROLAC TROMETHAMINE 30 MG: 30 INJECTION, SOLUTION INTRAMUSCULAR at 11:05

## 2019-05-09 RX ADMIN — OXYCODONE HYDROCHLORIDE AND ACETAMINOPHEN 1 TABLET: 10; 325 TABLET ORAL at 10:05

## 2019-05-09 RX ADMIN — SODIUM CHLORIDE, SODIUM LACTATE, POTASSIUM CHLORIDE, AND CALCIUM CHLORIDE: 600; 310; 30; 20 INJECTION, SOLUTION INTRAVENOUS at 09:05

## 2019-05-09 RX ADMIN — ALBUMIN (HUMAN): 2.5 SOLUTION INTRAVENOUS at 10:05

## 2019-05-09 RX ADMIN — FENTANYL CITRATE 50 MCG: 50 INJECTION, SOLUTION INTRAMUSCULAR; INTRAVENOUS at 12:05

## 2019-05-09 RX ADMIN — SODIUM CHLORIDE, SODIUM LACTATE, POTASSIUM CHLORIDE, AND CALCIUM CHLORIDE: .6; .31; .03; .02 INJECTION, SOLUTION INTRAVENOUS at 06:05

## 2019-05-09 RX ADMIN — KETOROLAC TROMETHAMINE 30 MG: 30 INJECTION, SOLUTION INTRAMUSCULAR at 06:05

## 2019-05-09 RX ADMIN — NEOSTIGMINE METHYLSULFATE 5 MG: 1 INJECTION INTRAVENOUS at 01:05

## 2019-05-09 RX ADMIN — OXYCODONE HYDROCHLORIDE 5 MG: 5 TABLET ORAL at 02:05

## 2019-05-09 RX ADMIN — FENTANYL CITRATE 50 MCG: 50 INJECTION, SOLUTION INTRAMUSCULAR; INTRAVENOUS at 01:05

## 2019-05-09 RX ADMIN — ROCURONIUM BROMIDE 50 MG: 10 INJECTION INTRAVENOUS at 10:05

## 2019-05-09 RX ADMIN — LIDOCAINE HYDROCHLORIDE 50 MG: 20 INJECTION, SOLUTION INTRAVENOUS at 10:05

## 2019-05-09 RX ADMIN — GLYCOPYRROLATE 0.6 MG: 0.2 INJECTION, SOLUTION INTRAMUSCULAR; INTRAVENOUS at 01:05

## 2019-05-09 RX ADMIN — FENTANYL CITRATE 50 MCG: 50 INJECTION, SOLUTION INTRAMUSCULAR; INTRAVENOUS at 10:05

## 2019-05-09 RX ADMIN — ALBUMIN (HUMAN): 2.5 SOLUTION INTRAVENOUS at 01:05

## 2019-05-09 RX ADMIN — FAMOTIDINE 20 MG: 20 TABLET, FILM COATED ORAL at 09:05

## 2019-05-09 RX ADMIN — PROPOFOL 150 MG: 10 INJECTION, EMULSION INTRAVENOUS at 10:05

## 2019-05-09 RX ADMIN — SODIUM CHLORIDE, SODIUM LACTATE, POTASSIUM CHLORIDE, AND CALCIUM CHLORIDE: 600; 310; 30; 20 INJECTION, SOLUTION INTRAVENOUS at 10:05

## 2019-05-09 NOTE — INTERVAL H&P NOTE
The patient has been examined and the H&P has been reviewed:    I concur with the findings and no changes have occurred since H&P was written.    Surgery risks, benefits and alternative options discussed and understood by patient/family.      Manju Alcala MD  Ob/Gyn PGY-3        There are no hospital problems to display for this patient.

## 2019-05-09 NOTE — BRIEF OP NOTE
[facility]  Brief Operative Note    SUMMARY     Surgery Date: 5/9/2019     Surgeon(s) and Role:     * Jennyfer Ibarra MD - Primary    Assisting Surgeon: Manju Alcala MD- Resident    Pre-op Diagnosis:  Uterine leiomyoma, unspecified location [D25.9]    Post-op Diagnosis:  Post-Op Diagnosis Codes:     * Uterine leiomyoma, unspecified location [D25.9]    Procedure(s) (LRB):  MYOMECTOMY OPEN (N/A)    Anesthesia: General    Description of Procedure:   - Uterus enlarged, about 15 wk size, with several fibroids throughout   - 13 fibroids removed in total  - Hemostasis obtained at close of case  - Exparel adminisered  - PATIENT IS NOT A CANDIDATE FOR VAGINAL DELIVERY       Estimated Blood Loss: 400 mL         Specimens:   Specimen (12h ago, onward)    Start     Ordered    05/09/19 1104  Specimen to Pathology - Surgery  Once     Comments:  1. fibroids     Start Status     05/09/19 1104 Collected (05/09/19 1318) Order ID: [id number]       05/09/19 1103

## 2019-05-09 NOTE — OP NOTE
DATE: 05/09/2019     OPERATIVE REPORT    PREOPERATIVE DIAGNOSIS  1. AUB-L    POSTOPERATIVE DIAGNOSIS  1. Same    PROCEDURE:  1. Abdominal Myomectomy    SURGEON: Jennyfer Ibarra M.D.    ASSISTANT: Manju Alcala M.D    ANESTHESIA: General    COMPLICATIONS: None    EBL: 400 cc    IV FLUIDS: 1750 cc    URINE OUTPUT: 425 cc    FINDINGS:   - Uterus enlarged, about 16-18 wk size, with multiple fibroids throughout, largest 6 cm posterior fundis, 3 posterior corpus each 4-5 cm, andterior carmita fibroid 4 cm, and 3 cm intramural fibroid in right uterine wall  - 13 fibroids removed in total  - Hemostasis obtained at close of case  - Exparel adminisered  - PATIENT IS NOT A CANDIDATE FOR VAGINAL DELIVERY          PROCEDURE: Patient was taking to the operating room where general anesthesia was administered and found to be adequate.  She was prepped and draped in the dorsal lithotomy position. A swanson catheter was inserted into the bladder.    A Pfannenstiel skin incision was made with the scalpel and carried down to the underlying layer of fascia with the scalpel.  The incision was extended laterally with Gates scissors.  The superior aspect of the incision was grasped with the [facility] clamps, tented up and the underlying muscles were dissected off bluntly.  Attention was turned to the inferior aspect of the incision.  The underlying muscles were dissected off bluntly.  The rectus muscles were  in the midline.  The peritoneum was entered in with Metzenbaum's.  The incision was extended with finger fracture.  All laparotomy sponges were removed from the surgical field.     All fibroids were removed in the following fashion: 20 Units of Pitressin diluted in 100cc of Normal saline was injected into the serosa overlying the fibroid.  An incision was made with a monopolar cautery.  This incision was carried down to the fibroid with the cautery.  The fibroid was grasped with a perforating towel clamp.  The fibroid was enucleated  from the underlying myometrium using blunt dissection.   Once the fibroid was completely enucleated, it was handed to the waiting surgical nurse.  The deepest layer of myometrium was reapproximated with 0 Vicryl with figure of eight stitches. The next layer of myometrium was reapproximated with 2-0 Vicryl in a running, locked fashion.  The serosa was reapproximated with 2-0 Monocryl in a baseball stitch fashion.  Excellent hemostasis was noted.      This process was performed, removing 13 fibroids.     The uterus was copiously irrigated.  Excellent hemostasis was noted. The surgical incisions of the uterus were covered with interceed.  The muscle was reapproximated with 2-0 Vicryl.  The fascia was reapproximated with 2-0 vicryl in a running fashion.  Exparel was injected both above and below the fascia. The subcutaneous tissue was reapproximated with 2-0 Vicryl in an interrupted fashion.  The skin was closed with 4-0 Monocryl in a subcuticular fashion.  Sponge, lap, and needle counts were correct x 2.  The patient was taken to the recovery room in stable condition with the swanson draining clear urine.        THIS PATIENT IS NOT A CANDIDATE FOR A VAGINAL DELIVERY.    Manju Alcala MD  Ob/Gyn PGY-3    I WAS SCRUBBED THROUGHOUT THE CASE AND PERFORMED THE ESSENTIAL PORTIONS OF THE PROCEDURE

## 2019-05-09 NOTE — ANESTHESIA POSTPROCEDURE EVALUATION
Anesthesia Post Evaluation    Patient: Richard Perales    Procedure(s) Performed: Procedure(s) (LRB):  MYOMECTOMY OPEN (N/A)    Final Anesthesia Type: general  Patient location during evaluation: PACU  Patient participation: Yes- Able to Participate  Level of consciousness: awake and alert  Post-procedure vital signs: reviewed and stable  Pain management: adequate  Airway patency: patent  PONV status at discharge: No PONV  Anesthetic complications: no      Cardiovascular status: hemodynamically stable  Respiratory status: unassisted  Hydration status: euvolemic  Follow-up not needed.          Vitals Value Taken Time   /69 5/9/2019  2:54 PM   Temp 37.2 °C (98.9 °F) 5/9/2019  2:07 PM   Pulse 85 5/9/2019  2:56 PM   Resp 18 5/9/2019  2:40 PM   SpO2 100 % 5/9/2019  2:56 PM   Vitals shown include unvalidated device data.      No case tracking events are documented in the log.      Pain/Ulysses Score: Pain Rating Prior to Med Admin: 4 (5/9/2019  2:50 PM)  Ulysses Score: 8 (5/9/2019  2:07 PM)

## 2019-05-09 NOTE — NURSING
Pt arrived to floor via stretcher with NGUYEN Saba and transferred to bed. IVF started, SCDs applied, oriented to room, call light placed within reach, bed low and locked. No complaints of pain. Morin noted draining clear yellow urine to gravity. Incisions noted to lower abdomen, CDI. VSS. Patient very sleepy but wakes to sound of voice. No acute distress noted at this time. Will continue to monitor.

## 2019-05-09 NOTE — TRANSFER OF CARE
"Anesthesia Transfer of Care Note    Patient: Richard Perales    Procedure(s) Performed: Procedure(s) (LRB):  MYOMECTOMY OPEN (N/A)    Patient location: PACU    Anesthesia Type: general    Transport from OR: Transported from OR on 2-3 L/min O2 by NC with adequate spontaneous ventilation    Post pain: adequate analgesia    Post assessment: no apparent anesthetic complications    Post vital signs: stable    Level of consciousness: alert and awake    Nausea/Vomiting: no nausea/vomiting    Complications: none    Transfer of care protocol was followed      Last vitals:   Visit Vitals  /74   Pulse 78   Resp 18   Ht 5' 4" (1.626 m)   Wt 107.5 kg (237 lb)   LMP 05/07/2019   SpO2 98%   Breastfeeding? No   BMI 40.68 kg/m²     "

## 2019-05-10 LAB
ANISOCYTOSIS BLD QL SMEAR: ABNORMAL
BASOPHILS # BLD AUTO: 0.02 K/UL (ref 0–0.2)
BASOPHILS NFR BLD: 0.1 % (ref 0–1.9)
DIFFERENTIAL METHOD: ABNORMAL
EOSINOPHIL # BLD AUTO: 0 K/UL (ref 0–0.5)
EOSINOPHIL NFR BLD: 0 % (ref 0–8)
ERYTHROCYTE [DISTWIDTH] IN BLOOD BY AUTOMATED COUNT: 19.4 % (ref 11.5–14.5)
GIANT PLATELETS BLD QL SMEAR: PRESENT
HCT VFR BLD AUTO: 25.8 % (ref 37–48.5)
HGB BLD-MCNC: 7.5 G/DL (ref 12–16)
HYPOCHROMIA BLD QL SMEAR: ABNORMAL
LYMPHOCYTES # BLD AUTO: 1.8 K/UL (ref 1–4.8)
LYMPHOCYTES NFR BLD: 8.4 % (ref 18–48)
MCH RBC QN AUTO: 20.7 PG (ref 27–31)
MCHC RBC AUTO-ENTMCNC: 29.1 G/DL (ref 32–36)
MCV RBC AUTO: 71 FL (ref 82–98)
MONOCYTES # BLD AUTO: 1.6 K/UL (ref 0.3–1)
MONOCYTES NFR BLD: 7.7 % (ref 4–15)
NEUTROPHILS # BLD AUTO: 17.4 K/UL (ref 1.8–7.7)
NEUTROPHILS NFR BLD: 83.8 % (ref 38–73)
PLATELET # BLD AUTO: 424 K/UL (ref 150–350)
PLATELET BLD QL SMEAR: ABNORMAL
PMV BLD AUTO: 8.8 FL (ref 9.2–12.9)
POIKILOCYTOSIS BLD QL SMEAR: SLIGHT
POLYCHROMASIA BLD QL SMEAR: ABNORMAL
RBC # BLD AUTO: 3.63 M/UL (ref 4–5.4)
TARGETS BLD QL SMEAR: ABNORMAL
WBC # BLD AUTO: 20.91 K/UL (ref 3.9–12.7)

## 2019-05-10 PROCEDURE — 94799 UNLISTED PULMONARY SVC/PX: CPT

## 2019-05-10 PROCEDURE — 36415 COLL VENOUS BLD VENIPUNCTURE: CPT

## 2019-05-10 PROCEDURE — 63600175 PHARM REV CODE 636 W HCPCS: Performed by: STUDENT IN AN ORGANIZED HEALTH CARE EDUCATION/TRAINING PROGRAM

## 2019-05-10 PROCEDURE — 85025 COMPLETE CBC W/AUTO DIFF WBC: CPT

## 2019-05-10 PROCEDURE — 94761 N-INVAS EAR/PLS OXIMETRY MLT: CPT

## 2019-05-10 PROCEDURE — 25000003 PHARM REV CODE 250: Performed by: STUDENT IN AN ORGANIZED HEALTH CARE EDUCATION/TRAINING PROGRAM

## 2019-05-10 PROCEDURE — 11000001 HC ACUTE MED/SURG PRIVATE ROOM

## 2019-05-10 RX ORDER — IBUPROFEN 600 MG/1
600 TABLET ORAL EVERY 6 HOURS PRN
Qty: 30 TABLET | Refills: 1 | Status: SHIPPED | OUTPATIENT
Start: 2019-05-10

## 2019-05-10 RX ORDER — OXYCODONE AND ACETAMINOPHEN 5; 325 MG/1; MG/1
1 TABLET ORAL EVERY 4 HOURS PRN
Qty: 30 TABLET | Refills: 0 | Status: SHIPPED | OUTPATIENT
Start: 2019-05-10

## 2019-05-10 RX ADMIN — OXYCODONE AND ACETAMINOPHEN 1 TABLET: 5; 325 TABLET ORAL at 04:05

## 2019-05-10 RX ADMIN — ONDANSETRON 8 MG: 8 TABLET, ORALLY DISINTEGRATING ORAL at 09:05

## 2019-05-10 RX ADMIN — OXYCODONE HYDROCHLORIDE AND ACETAMINOPHEN 1 TABLET: 10; 325 TABLET ORAL at 09:05

## 2019-05-10 RX ADMIN — KETOROLAC TROMETHAMINE 30 MG: 30 INJECTION, SOLUTION INTRAMUSCULAR at 05:05

## 2019-05-10 RX ADMIN — IBUPROFEN 600 MG: 600 TABLET ORAL at 03:05

## 2019-05-10 RX ADMIN — KETOROLAC TROMETHAMINE 30 MG: 30 INJECTION, SOLUTION INTRAMUSCULAR at 12:05

## 2019-05-10 RX ADMIN — DIPHENHYDRAMINE HYDROCHLORIDE 25 MG: 25 CAPSULE ORAL at 11:05

## 2019-05-10 RX ADMIN — MUPIROCIN 1 G: 20 OINTMENT TOPICAL at 09:05

## 2019-05-10 RX ADMIN — IBUPROFEN 600 MG: 600 TABLET ORAL at 11:05

## 2019-05-10 RX ADMIN — PAROXETINE HYDROCHLORIDE HEMIHYDRATE 20 MG: 20 TABLET, FILM COATED ORAL at 06:05

## 2019-05-10 NOTE — MEDICAL/APP STUDENT
[facility]  Obstetrics & Gynecology  Progress Note    Patient Name: Richard Perales  MRN: [id number]  Admission Date: 2019  Primary Care Provider: GERONIMO Guallpa MD  Principal Problem: S/P myomectomy    Subjective:     HPI:  34 yo  here for scheduled open myomectomy for treatment of AUB-L    Interval History: POD #1.  No acute events overnight.  Patient does c/o of pain, says her pain is now 5/10 (prior 2/10).  PRN two oxycodone tablets overnight (10 PM, 4:30 AM) for pain control.  Patient voiding, Morin just removed prior to check.  No ambulation yet, positions self in bed. Tolerated solid foods, had turkey sandwich and potato chips. Patient also complains of cough and numbness on lips and R digits (1-3).    Scheduled Meds:   ibuprofen  600 mg Oral Q6H    ketorolac  30 mg Intravenous Q6H    mupirocin  1 g Nasal BID    paroxetine  20 mg Oral QAM     Continuous Infusions:   lactated ringers 125 mL/hr at 19 1815     PRN Meds:bisacodyl, diphenhydrAMINE, diphenhydrAMINE, HYDROmorphone, ondansetron, oxyCODONE-acetaminophen, oxyCODONE-acetaminophen, promethazine (PHENERGAN) IVPB    Review of patient's allergies indicates:  No Known Allergies    Objective:     Vital Signs (Most Recent):  Temp: 98.3 °F (36.8 °C) (05/10/19 0412)  Pulse: 77 (05/10/19 0412)  Resp: 18 (05/10/19 0412)  BP: 119/68 (05/10/19 0412)  SpO2: 95 % (05/10/19 0412) Vital Signs (24h Range):  Temp:  [97.4 °F (36.3 °C)-98.9 °F (37.2 °C)] 98.3 °F (36.8 °C)  Pulse:  [] 77  Resp:  [14-18] 18  SpO2:  [95 %-100 %] 95 %  BP: (111-129)/(56-80) 119/68     Weight: 107.5 kg (237 lb)  Body mass index is 40.68 kg/m².  Patient's last menstrual period was 2019.    I&O (Last 24H):    Intake/Output Summary (Last 24 hours) at 5/10/2019 0617  Last data filed at 5/10/2019 0550  Gross per 24 hour   Intake 3368.75 ml   Output 1900 ml   Net 1468.75 ml       Physical Exam   Physical Exam:   Constitutional: She appears  well-developed and well-nourished. No distress.    HENT:   Head: Normocephalic and atraumatic.    Eyes: Conjunctivae are normal.    Neck: Normal range of motion.    Cardiovascular: Normal rate.     Pulmonary/Chest: Effort normal. No respiratory distress.         Abdominal: Soft. Bowel sounds are normal. She exhibits abdominal incision (pfannenstiel incision covered in bandage, c/d/i). She exhibits no distension. There is no tenderness. There is no rebound and no guarding.             Musculoskeletal: She exhibits no edema or tenderness.       Neurological: She is alert.    Skin: Skin is warm and dry. She is not diaphoretic.   Psychiatric: She has a normal mood and affect. Her behavior is normal. Judgment and thought content normal.           Laboratory:  CBC:   Recent Labs   Lab 05/10/19  0422   WBC 20.91*   RBC 3.63*   HGB 7.5*   HCT 25.8*   *   MCV 71*   MCH 20.7*   MCHC 29.1*         Assessment/Plan:     Active Diagnoses:    Diagnosis Date Noted POA    PRINCIPAL PROBLEM:  S/P myomectomy [Z98.890] 05/09/2019 Not Applicable    Uterine leiomyoma [D25.9] 05/09/2019 Yes      Problems Resolved During this Admission:       S/p abdominal myomectomy  Doing well. Afebrile, vital signs stable.  - Routine advances  - Pain control with toradol and PO pain medications  - Cont reg diet/DC IVFs in AM  - Discontinue swanson in AM, Monitor UOP  - Encourage ambulation  - Prophylaxis: IS/TEDs/SCDs    Depression  - Continue julius Barba  Obstetrics & Gynecology  [facility]

## 2019-05-10 NOTE — PROGRESS NOTES
[facility]  Obstetrics & Gynecology  Progress Note    Patient Name: Richard Perales  MRN: [id number]  Admission Date: 5/9/2019  Primary Care Provider: GERONIOM Guallpa MD  Principal Problem: <principal problem not specified>    Subjective:     Interval History:   Patient seen on afternoon rounds, noted to be very drowsy.  Reports pain is well controlled. She has tolerated water and is about to trial dinner.  She has not ambulated, swanson remains in place. She denies flatus.     Scheduled Meds:   [START ON 5/10/2019] ibuprofen  600 mg Oral Q6H    ketorolac  30 mg Intravenous Q6H    mupirocin  1 g Nasal BID    [START ON 5/10/2019] paroxetine  20 mg Oral QAM     Continuous Infusions:   lactated ringers 125 mL/hr at 05/09/19 1815     PRN Meds:bisacodyl, diphenhydrAMINE, diphenhydrAMINE, HYDROmorphone, ondansetron, oxyCODONE-acetaminophen, oxyCODONE-acetaminophen, promethazine (PHENERGAN) IVPB    Review of patient's allergies indicates:  No Known Allergies    Objective:     Vital Signs (Most Recent):  Temp: 98 °F (36.7 °C) (05/09/19 1942)  Pulse: 63 (05/09/19 1942)  Resp: 18 (05/09/19 1942)  BP: 123/80 (05/09/19 1942)  SpO2: 98 % (05/09/19 1942) Vital Signs (24h Range):  Temp:  [97.9 °F (36.6 °C)-98.9 °F (37.2 °C)] 98 °F (36.7 °C)  Pulse:  [] 63  Resp:  [14-18] 18  SpO2:  [95 %-100 %] 98 %  BP: (111-129)/(56-80) 123/80     Weight: 107.5 kg (237 lb)  Body mass index is 40.68 kg/m².  Patient's last menstrual period was 05/07/2019.    I&O (Last 24H):    Intake/Output Summary (Last 24 hours) at 5/9/2019 1949  Last data filed at 5/9/2019 1530  Gross per 24 hour   Intake 2650 ml   Output 1200 ml   Net 1450 ml       Physical Exam:   Constitutional: She is oriented to person, place, and time. She appears well-developed and well-nourished.     Eyes: EOM are normal.    Neck: Normal range of motion.    Cardiovascular: Normal rate.     Pulmonary/Chest: Effort normal. No respiratory distress. She  has no wheezes.        Abdominal: Soft. She exhibits abdominal incision (pfannenstiel incision covered in bandage, c/d/i). She exhibits no distension. There is no tenderness. There is no rebound and no guarding.             Musculoskeletal: Normal range of motion.       Neurological: She is alert and oriented to person, place, and time.    Skin: Skin is warm and dry.    Psychiatric: She has a normal mood and affect. Her behavior is normal. Judgment and thought content normal.       Laboratory:  AM CBC      Assessment/Plan:     Active Diagnoses:    Diagnosis Date Noted POA    Uterine leiomyoma [D25.9] 05/09/2019 Yes    S/P myomectomy [Z98.890] 05/09/2019 Not Applicable      Problems Resolved During this Admission:       PostOp Abdominal Myomectomy  Doing well. Afebrile, vital signs stable.  - Routine advances  - Pain control with toradol and PO pain medications  - Cont reg diet/DC IVFs in AM  - Discontinue swanson in AM, Monitor UOP  - AM labs  - Encourage ambulation  - Prophylaxis: IS/TEDs/SCDs    Depression  - Continue julius Alcala MD  Obstetrics & Gynecology  [facility]

## 2019-05-10 NOTE — PROGRESS NOTES
Patient seen on afternoon rounds.   She reports pain is well controlled. She has ambulated the halls.  She is eating a normal diet without N/V. She is voiding.  She denies flatus/BM.  She requests to stay additional night in hospital.     Temp:  [97.4 °F (36.3 °C)-98.9 °F (37.2 °C)] 98 °F (36.7 °C)  Pulse:  [63-87] 87  Resp:  [18] 18  SpO2:  [95 %-100 %] 99 %  BP: ()/(55-80) 127/68    GEN: No apparent distress. Alert and oriented.    CV: Regular rate  LUNGS: No increased WOB.  ABDOMEN: Soft, appropriately tender, non-distended. Normoactive bowel sounds. No guarding or rebound tenderness.  INCISION: bandage in place, clean/dry/intact  EXT: No erythema, no edema, TEDs/SCDs in place  PELVIC: deferred    A/P  - Continue pain regimen  - Cont regular diet  - Encourage ambulation   - TEDS/SCDs      Anticipate D/C tomorrow.     Manju Alcala MD  Ob/Gyn PGY-3

## 2019-05-10 NOTE — PLAN OF CARE
SW met with pt at bedside to complete discharge assessment, verified PCP and uses [facility].  Pt has RW, BSC, SC, straight cane.  Pt's uncle, Braden will provide transportation home.  No needs identifed.     05/10/19 0710   Discharge Assessment   Assessment Type Discharge Planning Assessment   Confirmed/corrected address and phone number on facesheet? Yes   Assessment information obtained from? Patient   Communicated expected length of stay with patient/caregiver no   Prior to hospitilization cognitive status: Alert/Oriented   Prior to hospitalization functional status: Independent   Current cognitive status: Alert/Oriented   Current Functional Status: Independent   Lives With parent(s)   Able to Return to Prior Arrangements yes   Is patient able to care for self after discharge? Unable to determine at this time (comments)   Patient currently being followed by outpatient case management? No   Patient currently receives any other outside agency services? No   Equipment Currently Used at Home walker, rolling;bedside commode;shower chair;cane, straight   Do you have any problems affording any of your prescribed medications? No   Is the patient taking medications as prescribed? yes   Does the patient have transportation home? Yes   Transportation Anticipated family or friend will provide   Does the patient receive services at the [facility]? No   Discharge Plan A Home   DME Needed Upon Discharge  none   Patient/Family in Agreement with Plan yes

## 2019-05-10 NOTE — PROGRESS NOTES
[facility]  Obstetrics & Gynecology  Progress Note    Patient Name: Richard Perales  MRN: [id number]  Admission Date: 5/9/2019  Primary Care Provider: GERONIMO Guallpa MD  Principal Problem: S/P myomectomy    Subjective:     Interval History:   Patient doing well this AM. Reports pain improves with medication. She is tolerating PO without N/V.  Morin was recently removed, she has yet to ambulate or void.  She denies flatus.     Scheduled Meds:   ibuprofen  600 mg Oral Q6H    ketorolac  30 mg Intravenous Q6H    mupirocin  1 g Nasal BID    paroxetine  20 mg Oral QAM     Continuous Infusions:   lactated ringers 125 mL/hr at 05/09/19 1815     PRN Meds:bisacodyl, diphenhydrAMINE, diphenhydrAMINE, HYDROmorphone, ondansetron, oxyCODONE-acetaminophen, oxyCODONE-acetaminophen, promethazine (PHENERGAN) IVPB    Review of patient's allergies indicates:  No Known Allergies    Objective:     Vital Signs (Most Recent):  Temp: 98.3 °F (36.8 °C) (05/10/19 0412)  Pulse: 77 (05/10/19 0412)  Resp: 18 (05/10/19 0412)  BP: 119/68 (05/10/19 0412)  SpO2: 95 % (05/10/19 0412) Vital Signs (24h Range):  Temp:  [97.4 °F (36.3 °C)-98.9 °F (37.2 °C)] 98.3 °F (36.8 °C)  Pulse:  [] 77  Resp:  [14-18] 18  SpO2:  [95 %-100 %] 95 %  BP: (111-129)/(56-80) 119/68     Weight: 107.5 kg (237 lb)  Body mass index is 40.68 kg/m².  Patient's last menstrual period was 05/07/2019.    I&O (Last 24H):    Intake/Output Summary (Last 24 hours) at 5/10/2019 0658  Last data filed at 5/10/2019 0550  Gross per 24 hour   Intake 3368.75 ml   Output 1900 ml   Net 1468.75 ml       Physical Exam:   Constitutional: She is oriented to person, place, and time. She appears well-developed and well-nourished.     Eyes: EOM are normal.    Neck: Normal range of motion.    Cardiovascular: Normal rate.     Pulmonary/Chest: Effort normal. No respiratory distress. She has no wheezes.        Abdominal: Soft. Bowel sounds are normal. She exhibits  abdominal incision (pfannenstiel incision covered in bandage, c/d/i). She exhibits no distension. There is no tenderness. There is no rebound and no guarding.             Musculoskeletal: Normal range of motion.       Neurological: She is alert and oriented to person, place, and time.    Skin: Skin is warm and dry.    Psychiatric: She has a normal mood and affect. Her behavior is normal. Judgment and thought content normal.       Laboratory:  Recent Labs   Lab 05/03/19  1236 05/10/19  0422   WBC 9.37 20.91*   HGB 9.2* 7.5*   HCT 30.6* 25.8*   MCV 70* 71*   * 424*            Assessment/Plan:     Active Diagnoses:    Diagnosis Date Noted POA    PRINCIPAL PROBLEM:  S/P myomectomy [Z98.890] 05/09/2019 Not Applicable    Uterine leiomyoma [D25.9] 05/09/2019 Yes      Problems Resolved During this Admission:       PostOp Abdominal Myomectomy  Doing well. Afebrile, vital signs stable.  - Routine advances  - Pain control with toradol and PO pain medications, transitioning to PO ibuprofen and norco with dilaudid for BTP.  - Cont reg diet/DC IVFs this AM  - Discontinue swanson in AM, UOP 0.55 cc/kg/hr  - 9/30 > 7.5/25.8. Will assess symptoms once patient ambulates  - Encourage ambulation  - Prophylaxis: IS/TEDs/SCDs    Depression  - Continue paxil         Anticipate discharge POD#1-2 once patient is ambulating, tolerating PO, pain well controlled, voiding spontaneously.      Manju Alcala MD  Obstetrics & Gynecology  [facility]    ASYMPTOMATIC NICK, HAS AMBULATED, TOLERATED POs WITHOUT DIFFICULTY.  HAS SOME BASELINE DIFFICULTIES WITH MOBILITY DUE TO LEUKODYSTROPHY, SO WILL MONITOR PROGRESS OF POSTOP COURSE AND PLAN DISCHARGE FOR POD #2 IF CONT TO PROGRESS AS ANTICIPATED

## 2019-05-10 NOTE — PLAN OF CARE
Problem: Adult Inpatient Plan of Care  Goal: Plan of Care Review  Outcome: Ongoing (interventions implemented as appropriate)  Pt VSS and afebrile, swanson D/C at 0600 this shift. Pain controlled with PO medication, positions self in bed, on regular diet. Pt is due to void. Pt in low locked bed, free of falls and injury, call light in reach, purposeful rounding performed. Will continue to monitor pt. Pt has transverse incision to abdomen C/D/I.

## 2019-05-11 VITALS
SYSTOLIC BLOOD PRESSURE: 125 MMHG | OXYGEN SATURATION: 93 % | HEIGHT: 64 IN | BODY MASS INDEX: 40.46 KG/M2 | HEART RATE: 88 BPM | WEIGHT: 237 LBS | TEMPERATURE: 98 F | RESPIRATION RATE: 16 BRPM | DIASTOLIC BLOOD PRESSURE: 74 MMHG

## 2019-05-11 PROBLEM — E66.01 MORBID OBESITY: Status: ACTIVE | Noted: 2019-05-11

## 2019-05-11 PROBLEM — D62 ACUTE POSTHEMORRHAGIC ANEMIA: Status: ACTIVE | Noted: 2019-05-11

## 2019-05-11 PROCEDURE — 99024 POSTOP FOLLOW-UP VISIT: CPT | Mod: ,,, | Performed by: OBSTETRICS & GYNECOLOGY

## 2019-05-11 PROCEDURE — 25000003 PHARM REV CODE 250: Performed by: STUDENT IN AN ORGANIZED HEALTH CARE EDUCATION/TRAINING PROGRAM

## 2019-05-11 PROCEDURE — 99024 PR POST-OP FOLLOW-UP VISIT: ICD-10-PCS | Mod: ,,, | Performed by: OBSTETRICS & GYNECOLOGY

## 2019-05-11 RX ADMIN — OXYCODONE HYDROCHLORIDE AND ACETAMINOPHEN 1 TABLET: 10; 325 TABLET ORAL at 05:05

## 2019-05-11 RX ADMIN — IBUPROFEN 600 MG: 600 TABLET ORAL at 05:05

## 2019-05-11 RX ADMIN — IBUPROFEN 600 MG: 600 TABLET ORAL at 01:05

## 2019-05-11 RX ADMIN — MUPIROCIN 1 G: 20 OINTMENT TOPICAL at 09:05

## 2019-05-11 RX ADMIN — PAROXETINE HYDROCHLORIDE HEMIHYDRATE 20 MG: 20 TABLET, FILM COATED ORAL at 05:05

## 2019-05-11 NOTE — PROGRESS NOTES
[facility]  Obstetrics & Gynecology  Progress Note    Patient Name: Richard Perales  MRN: [id number]  Admission Date: 5/9/2019  Primary Care Provider: GERONIMO Guallpa MD  Principal Problem: S/P myomectomy    Subjective:     Interval History:   Patient doing well this AM.  Reports pain is well controlled. She is tolerating a regular diet.  She is ambulating, voiding, and passing flatus.  She is ready for discharge.     Scheduled Meds:   ibuprofen  600 mg Oral Q6H    mupirocin  1 g Nasal BID    paroxetine  20 mg Oral QAM     Continuous Infusions:    PRN Meds:bisacodyl, diphenhydrAMINE, diphenhydrAMINE, HYDROmorphone, ondansetron, oxyCODONE-acetaminophen, oxyCODONE-acetaminophen, promethazine (PHENERGAN) IVPB    Review of patient's allergies indicates:  No Known Allergies    Objective:     Vital Signs (Most Recent):  Temp: 98.4 °F (36.9 °C) (05/11/19 0536)  Pulse: 87 (05/11/19 0536)  Resp: 18 (05/11/19 0536)  BP: 118/71 (05/11/19 0536)  SpO2: 95 % (05/11/19 0536) Vital Signs (24h Range):  Temp:  [97.6 °F (36.4 °C)-98.9 °F (37.2 °C)] 98.4 °F (36.9 °C)  Pulse:  [75-87] 87  Resp:  [16-18] 18  SpO2:  [95 %-99 %] 95 %  BP: ()/(55-75) 118/71     Weight: 107.5 kg (237 lb)  Body mass index is 40.68 kg/m².  Patient's last menstrual period was 05/07/2019.    I&O (Last 24H):    Intake/Output Summary (Last 24 hours) at 5/11/2019 0716  Last data filed at 5/11/2019 0500  Gross per 24 hour   Intake 420 ml   Output 1350 ml   Net -930 ml       Physical Exam:   Constitutional: She is oriented to person, place, and time. She appears well-developed and well-nourished.     Eyes: EOM are normal.    Neck: Normal range of motion.    Cardiovascular: Normal rate.     Pulmonary/Chest: Effort normal. No respiratory distress. She has no wheezes.        Abdominal: Soft. She exhibits abdominal incision (pfannenstiel incision covered in bandage, c/d/i). She exhibits no distension. There is no tenderness. There is  no rebound and no guarding.             Musculoskeletal: Normal range of motion.       Neurological: She is alert and oriented to person, place, and time.    Skin: Skin is warm and dry.    Psychiatric: She has a normal mood and affect. Her behavior is normal. Judgment and thought content normal.       Laboratory:  Recent Labs   Lab 05/10/19  0422   WBC 20.91*   HGB 7.5*   HCT 25.8*   MCV 71*   *            Assessment/Plan:     Active Diagnoses:    Diagnosis Date Noted POA    PRINCIPAL PROBLEM:  S/P myomectomy [Z98.890] 05/09/2019 Not Applicable    Uterine leiomyoma [D25.9] 05/09/2019 Yes      Problems Resolved During this Admission:       PostOp Abdominal Myomectomy  Doing well. Afebrile, vital signs stable.  - Routine advances  - Pain control with PO pain medications  - Cont reg diet  - Postop CBc 7/25, asymptomatic  - Encourage ambulation  - Prophylaxis: IS/TEDs/SCDs      Depression  - Continue julius Alcala MD  Obstetrics & Gynecology  [facility]

## 2019-05-11 NOTE — PLAN OF CARE
Problem: Adult Inpatient Plan of Care  Goal: Plan of Care Review  Outcome: Ongoing (interventions implemented as appropriate)  Pt on room air, SpO2 98%. IS done.

## 2019-05-11 NOTE — PLAN OF CARE
05/11/19 0854   Final Note   Assessment Type Final Discharge Note   Anticipated Discharge Disposition Home   What phone number can be called within the next 1-3 days to see how you are doing after discharge? [phone]   Discharge plans and expectations educations in teach back method with documentation complete? Yes   Right Care Referral Info   Post Acute Recommendation No Care

## 2019-05-11 NOTE — DISCHARGE INSTRUCTIONS
Myomectomy  Myomectomy is a surgical procedure to remove uterine fibroids. This procedure may preserve your uterus and your ability to have children.  Before your surgery  Depending on which procedure you and your healthcare provider choose, you may be asked to do the following:  · Have tests that your health care provider has ordered.  · Stop eating and drinking at midnight before your surgery, or as recommended by your healthcare provider.  · Take medicines as prescribed by your healthcare provider to shrink fibroids.  · Stop taking aspirin or ibuprofen 1 week before surgery. Tell your healthcare provider what other medicines you take and ask whether you should stop them.  · Arrange for a ride home after surgery.    Types of myomectomy procedures  Abdominal  Your healthcare provider makes incisions in your stomach and uterus to remove the fibroids. Then the uterus is repaired and the incisions are closed.  Laparoscopic  Your healthcare provider inserts a laparoscope and other surgical instruments through half-inch incisions in your stomach. One or more incisions are made in your uterus to remove the fibroids. Then the uterus is repaired and the incisions are closed.  Hysteroscopic  A hysteroscope is inserted into the vagina. An instrument is used to remove the fibroids from your uterus.  Call your healthcare provider  Contact your healthcare provider right away if you have any of the following:  · Severe or increasing pain  · Fever or chills  · Nausea or vomiting  · Redness or swelling around your incision  · Persistent or heavy vaginal bleeding   Date Last Reviewed: 2/1/2017 © 2000-2017 [employer]. [address] [place] [id number]. All rights reserved. This information is not intended as a substitute for professional medical care. Always follow your healthcare professional's instructions.

## 2019-05-11 NOTE — PLAN OF CARE
Problem: Adult Inpatient Plan of Care  Goal: Plan of Care Review  Outcome: Ongoing (interventions implemented as appropriate)  Patient ambulating independently, tolerating a regular diet and voiding without difficulty. Patient states pain is well controlled. VSS.     Discharge instructions reviewed with patient. Patient verbalized understanding. All questions and concerns addressed. Medications given to patient from outpatient pharmacy. Reviewed all meds with patient. Patient dressed independently and is waiting on family for ride home.

## 2019-05-11 NOTE — DISCHARGE SUMMARY
[facility]  Obstetrics & Gynecology  Discharge Summary    Patient Name: Richard Perales  MRN: [id number]  Admission Date: 5/9/2019  Hospital Length of Stay: 2 days  Discharge Date and Time:  05/11/2019 7:19 AM  Attending Physician: Jennyfer Ibarra MD   Discharging Provider: Manju Alcala MD  Primary Care Provider: GERONIMO Guallpa MD      Hospital Course: Patient presented for scheduled procedure. Patient was passed back to OR for abdominal myomectomy. Please see OP note for further details. Tolerated procedure well and patient was taken to recovery in a stable condition. She was observed in the hospital for two days without complications. Prior to discharge patient was able to void, ambulate, tolerate PO and pain was well controlled with PO meds. Patient was given routine post-op instructions for which patient voiced understanding. Patient was subsequently discharged home.      Procedure(s) (LRB):  MYOMECTOMY OPEN (N/A)         Pending Diagnostic Studies:     None        Final Active Diagnoses:    Diagnosis Date Noted POA    PRINCIPAL PROBLEM:  S/P myomectomy [Z98.890] 05/09/2019 Not Applicable    Uterine leiomyoma [D25.9] 05/09/2019 Yes      Problems Resolved During this Admission:        Discharged Condition: good    Disposition:     Follow Up:  Follow-up Information     Jennyfer Ibarra MD In 6 weeks.    Specialties:  Obstetrics, Obstetrics and Gynecology  Why:  Post Operative Appointment  Contact information:  [address] [address]  [phone]                 Patient Instructions:      Notify your health care provider if you experience any of the following:  increased confusion or weakness     Notify your health care provider if you experience any of the following:  persistent dizziness, light-headedness, or visual disturbances     Notify your health care provider if you experience any of the following:  worsening rash     Notify your health care  provider if you experience any of the following:  severe persistent headache     Notify your health care provider if you experience any of the following:  difficulty breathing or increased cough     Notify your health care provider if you experience any of the following:  redness, tenderness, or signs of infection (pain, swelling, redness, odor or green/yellow discharge around incision site)     Notify your health care provider if you experience any of the following:  severe uncontrolled pain     Notify your health care provider if you experience any of the following:  persistent nausea and vomiting or diarrhea     Notify your health care provider if you experience any of the following:  temperature >100.4     Activity as tolerated     Medications:  Reconciled Home Medications:      Medication List      START taking these medications    ibuprofen 600 MG tablet  Commonly known as:  ADVIL,MOTRIN  Take 1 tablet (600 mg total) by mouth every 6 (six) hours as needed.     oxyCODONE-acetaminophen 5-325 mg per tablet  Commonly known as:  PERCOCET  Take 1 tablet by mouth every 4 (four) hours as needed for Pain.        CONTINUE taking these medications    acetaminophen 325 MG tablet  Commonly known as:  TYLENOL  Take 2 tablets (650 mg total) by mouth every 6 (six) hours.     baclofen 20 MG tablet  Commonly known as:  LIORESAL  Take 1 tablet (20 mg total) by mouth 3 (three) times daily.     LISET 0.35 mg tablet  Generic drug:  norethindrone  TAKE 1 TABLET DAILY  SOME ANTIBIOTICS MAY REDUCE THE EFFECTIVENESS OF THIS MEDICATION     multivitamin tablet  Commonly known as:  THERAGRAN  Take 1 tablet by mouth once daily.     paroxetine 20 MG tablet  Commonly known as:  PAXIL  Take 1 tablet (20 mg total) by mouth every morning.     traZODone 50 MG tablet  Commonly known as:  DESYREL  Take 1 tablet (50 mg total) by mouth every evening.     UNABLE TO FIND  1 tablet once daily. [facility] potassium supplement  99 mg             Manju Alcala MD  Obstetrics & Gynecology  [facility]

## 2019-05-19 ENCOUNTER — PATIENT MESSAGE (OUTPATIENT)
Dept: OBSTETRICS AND GYNECOLOGY | Facility: HOSPITAL | Age: 34
End: 2019-05-19

## 2019-07-03 ENCOUNTER — OFFICE VISIT (OUTPATIENT)
Dept: OBSTETRICS AND GYNECOLOGY | Facility: CLINIC | Age: 34
End: 2019-07-03
Payer: MEDICARE

## 2019-07-03 VITALS
SYSTOLIC BLOOD PRESSURE: 120 MMHG | DIASTOLIC BLOOD PRESSURE: 80 MMHG | HEIGHT: 64 IN | BODY MASS INDEX: 38.35 KG/M2 | WEIGHT: 224.63 LBS

## 2019-07-03 DIAGNOSIS — Z09 POSTOP CHECK: Primary | ICD-10-CM

## 2019-07-03 PROCEDURE — 99024 PR POST-OP FOLLOW-UP VISIT: ICD-10-PCS | Mod: S$GLB,,, | Performed by: OBSTETRICS & GYNECOLOGY

## 2019-07-03 PROCEDURE — 99999 PR PBB SHADOW E&M-EST. PATIENT-LVL III: CPT | Mod: PBBFAC,,, | Performed by: OBSTETRICS & GYNECOLOGY

## 2019-07-03 PROCEDURE — 99024 POSTOP FOLLOW-UP VISIT: CPT | Mod: S$GLB,,, | Performed by: OBSTETRICS & GYNECOLOGY

## 2019-07-03 PROCEDURE — 99999 PR PBB SHADOW E&M-EST. PATIENT-LVL III: ICD-10-PCS | Mod: PBBFAC,,, | Performed by: OBSTETRICS & GYNECOLOGY

## 2019-07-03 RX ORDER — FLUCONAZOLE 150 MG/1
TABLET ORAL
Refills: 8 | COMMUNITY
Start: 2019-04-15

## 2019-07-03 NOTE — PROGRESS NOTES
POSTOPERATIVE FOLLOW-UP:    The patient presents today following 5/9/2019 MYOMECTOMY.  There are no complaints, and both the procedure and the hospital course were uncomplicated.  IS DISABLED DUE TO LEUKODYSTROPHY, SO WILL NOT BE NEEDING RETURN TO WORK DOCUMENTS.  TAKES PROGESTERONE-ONLY PILLS AND HAS REFILLS, OK TO CONTINUE.  HAD MENSES ABOUT 2 WEEKS AGO, LIGHTER AND LESS CRAMPING.  ADVISED TO JUST F/U ROUTINE VISIT 1 YR AND ADVISED SILICON STRIPS    PHYSICAL EXAM:  Appearance: Well developed, well nourished, in no acute distress.  Skin: Normal turgor and no visible lesions.  Abdomen:  Soft, non tender, non distended, without hepatosplenomegaly, No masses appreciated.    Incision: CDI  Pelvic: Normal female external genitalia without lesions.  Normal hair distribution.  Adequate perineal body and normal urethral meatus.  Vagina moist and well-ruga kadie.  Cervix pink and without lesions.  No significant cystocele or rectocele.  Bimanual examination shows uterus normal size, normal position, regular shape, mobile, and non tender.    ASSESSMENT:  Doing well following her procedure.  She is following the anticipated course of recovery, and was advised regarding future wound care, activity, and need for follow up.      PLAN:  Follow up next routine visit.  The patient is advised to call if she has fever greater than 101.0F, increased bleeding/discharge, new-onset or increased pain, deviation from the anticipated subsequent course of recovery, questions, or concerns.

## 2019-08-05 DIAGNOSIS — G93.49 LEUKOENCEPHALOPATHY: ICD-10-CM

## 2019-08-05 RX ORDER — PAROXETINE HYDROCHLORIDE 20 MG/1
TABLET, FILM COATED ORAL
Qty: 30 TABLET | Refills: 11 | Status: SHIPPED | OUTPATIENT
Start: 2019-08-05 | End: 2020-09-18 | Stop reason: SDUPTHER

## 2019-08-09 DIAGNOSIS — G93.49 LEUKOENCEPHALOPATHY: ICD-10-CM

## 2019-08-12 RX ORDER — BACLOFEN 20 MG/1
20 TABLET ORAL 3 TIMES DAILY
Qty: 90 TABLET | Refills: 5 | Status: SHIPPED | OUTPATIENT
Start: 2019-08-12 | End: 2020-10-02

## 2019-09-10 RX ORDER — TRAZODONE HYDROCHLORIDE 50 MG/1
TABLET ORAL
Qty: 30 TABLET | Refills: 6 | Status: SHIPPED | OUTPATIENT
Start: 2019-09-10 | End: 2020-09-18 | Stop reason: SDUPTHER

## 2019-10-22 ENCOUNTER — TELEPHONE (OUTPATIENT)
Dept: NEUROLOGY | Facility: CLINIC | Age: 34
End: 2019-10-22

## 2019-10-22 NOTE — TELEPHONE ENCOUNTER
----- Message from Mark Maria sent at 10/22/2019 11:54 AM CDT -----  Contact: Patient   Patient is wanting to know if the office except her new insurance [employer]. She would like a call from the office      [phone]

## 2019-10-22 NOTE — TELEPHONE ENCOUNTER
Called pt and informed her that the [facility] does not accept her insurance [employer] and that its the [facility] location that accepts her insurance.

## 2019-10-22 NOTE — TELEPHONE ENCOUNTER
----- Message from Patrick Carpenter sent at 10/22/2019  3:55 PM CDT -----  Contact: pt @ [phone]  Pt is returning Selina's call

## 2019-12-07 ENCOUNTER — PATIENT MESSAGE (OUTPATIENT)
Dept: NEUROLOGY | Facility: CLINIC | Age: 34
End: 2019-12-07

## 2020-01-08 ENCOUNTER — TELEPHONE (OUTPATIENT)
Dept: NEUROLOGY | Facility: CLINIC | Age: 35
End: 2020-01-08

## 2020-02-11 ENCOUNTER — OFFICE VISIT (OUTPATIENT)
Dept: NEUROLOGY | Facility: CLINIC | Age: 35
End: 2020-02-11
Payer: MEDICARE

## 2020-02-11 VITALS
WEIGHT: 224 LBS | BODY MASS INDEX: 38.24 KG/M2 | HEIGHT: 64 IN | HEART RATE: 106 BPM | DIASTOLIC BLOOD PRESSURE: 82 MMHG | SYSTOLIC BLOOD PRESSURE: 130 MMHG

## 2020-02-11 DIAGNOSIS — R39.15 URINARY URGENCY: Primary | ICD-10-CM

## 2020-02-11 DIAGNOSIS — G31.80 LEUKODYSTROPHY: ICD-10-CM

## 2020-02-11 DIAGNOSIS — R53.83 FATIGUE, UNSPECIFIED TYPE: ICD-10-CM

## 2020-02-11 DIAGNOSIS — R79.9 ABNORMAL FINDING OF BLOOD CHEMISTRY, UNSPECIFIED: ICD-10-CM

## 2020-02-11 PROCEDURE — 99999 PR PBB SHADOW E&M-EST. PATIENT-LVL III: CPT | Mod: PBBFAC,,, | Performed by: PSYCHIATRY & NEUROLOGY

## 2020-02-11 PROCEDURE — 3008F BODY MASS INDEX DOCD: CPT | Mod: CPTII,S$GLB,, | Performed by: PSYCHIATRY & NEUROLOGY

## 2020-02-11 PROCEDURE — 99215 PR OFFICE/OUTPT VISIT, EST, LEVL V, 40-54 MIN: ICD-10-PCS | Mod: S$GLB,,, | Performed by: PSYCHIATRY & NEUROLOGY

## 2020-02-11 PROCEDURE — 3008F PR BODY MASS INDEX (BMI) DOCUMENTED: ICD-10-PCS | Mod: CPTII,S$GLB,, | Performed by: PSYCHIATRY & NEUROLOGY

## 2020-02-11 PROCEDURE — 99999 PR PBB SHADOW E&M-EST. PATIENT-LVL III: ICD-10-PCS | Mod: PBBFAC,,, | Performed by: PSYCHIATRY & NEUROLOGY

## 2020-02-11 PROCEDURE — 99215 OFFICE O/P EST HI 40 MIN: CPT | Mod: S$GLB,,, | Performed by: PSYCHIATRY & NEUROLOGY

## 2020-02-11 RX ORDER — GABAPENTIN 300 MG/1
300 CAPSULE ORAL 2 TIMES DAILY
Qty: 60 CAPSULE | Refills: 11 | Status: SHIPPED | OUTPATIENT
Start: 2020-02-11 | End: 2021-06-03 | Stop reason: SDUPTHER

## 2020-02-11 NOTE — PROGRESS NOTES
"Name: Richard Perales  MRN: [id number]   CSN: [id number]      Date: 02/11/2020    Chief Complaint / Interval History:   - might be due for steroids, last came in during 2018  - more spasticity all over  - urine is strong  - frequency    From May 2019:  - getting fibroids surgery next week  - doing well otherwise, no new complaints  - no falls  - strength is good  - doing sudoku for memory games  - bladder ok otherwise  - bowels ok  - no sex drive  - generalized anxiety    From OCt 2018:  - having more "panic attacks", waking up with chest tightness  - more confused now, but has been waxing and waning since 6 months  - never got her MRI done then, she is not sure why  - more urinary frequency, malodorous for 2 weeks  - legs are more wobbly  - says her ophthalmologist described a "blood clot" behind her right eye  - more shaky hands B    From March 2018:  - fell in December, was coming up steps, legs gave out and "werent' there anymore", cut her face  - more slurred speech in general since, using a walker  - no HCT or imaging since fall --> and last MRI was one year ago  - thinking is a little spacey  - WORRIED ABOUT PROGRESSION AGAIN, MORE DISABLED    From Dec 2017:  - still living with mom  - stayed in [place] on her trip -   - still with mildly slurred speech, loses training of thought  - walking and balance are pretty good  - no falls  - still doing her PT exercises  - has back pain wrapping around her, not traveling into legs  - has been a bit tighter since the LP?    From August 2017:  - has continued to improve in general  - high dose steroids in April and has continued with outpatient PT - amazing recovery  - flying to [place] today, going to visit, flying alone  - still living with mom, she is pleased with her recovery as well    From May with Dr. Hayden-Fee:   - Readmitted this past April for difficulty walking, received high dose steroids which helped her to feel much better   - Patient " "states overall she is feeling good, doing therapy  - Nothing has happened with the referrals to outside neurology, , rheumatology   - No PCP at this time but patient with number to call to get PCP   - Shaking in legs better   - Now having pain in knees     History of Present Illness (HPI):  29 yo W with 6 months progressive neuro decline, starting with vision changes in October 2015 and culminating to non-verbal, paraplegic. History per outside chart and family. No prior medical issues except for shingles last year. Complained of vision changes in October and colleagues noticed slurring of speech in December. Started having problems with gait in January and taken to hospital by family when she could not get out of bed. Seen in [place] and apparently had "extensive" neurologic and rheumatologic evaluations (records sent are slim), transferred here as neurologist had nothing more to offer."    Past Medical History: The patient  has a past medical history of Depression, Leukodystrophy, and Neurologic disorder.    Social History: The patient  reports that she has been smoking. She has never used smokeless tobacco. She reports that she drinks alcohol. She reports that she does not use drugs. used to drink frequently, 3-4 drinks of wine coolers, no smoking or drugs.    Family History: Their family history includes Other in her other. Maternal great uncle was wheelchair bound for unknown reasons to patient. Patient is an only child. No other known neurologic disease in family.     Allergies: Patient has no known allergies.     Meds:   Current Outpatient Medications on File Prior to Visit   Medication Sig Dispense Refill    acetaminophen (TYLENOL) 325 MG tablet Take 2 tablets (650 mg total) by mouth every 6 (six) hours.  0    baclofen (LIORESAL) 20 MG tablet Take 1 tablet (20 mg total) by mouth 3 (three) times daily. 90 tablet 5    multivitamin (THERAGRAN) tablet Take 1 tablet by mouth once daily.      " "paroxetine (PAXIL) 20 MG tablet TAKE ONE TABLET BY MOUTH EVERY DAY IN THE MORNING 30 tablet 11    traZODone (DESYREL) 50 MG tablet TAKE ONE TABLET BY MOUTH EVERY DAY IN THE EVENING FOR SLEEP 30 tablet 6    UNABLE TO FIND 1 tablet once daily. [facility] potassium supplement  99 mg      LISET 0.35 mg tablet TAKE 1 TABLET DAILY  SOME ANTIBIOTICS MAY REDUCE THE EFFECTIVENESS OF THIS MEDICATION  11    fluconazole (DIFLUCAN) 150 MG Tab TAKE ONE TABLET BY MOUTH EVERY DAY FOR 3 DAYS  8    ibuprofen (ADVIL,MOTRIN) 600 MG tablet Take 1 tablet (600 mg total) by mouth every 6 (six) hours as needed. 30 tablet 1    oxyCODONE-acetaminophen (PERCOCET) 5-325 mg per tablet Take 1 tablet by mouth every 4 (four) hours as needed for Pain. 30 tablet 0     No current facility-administered medications on file prior to visit.        Exam:  /82   Pulse 106   Ht 5' 4" (1.626 m)   Wt 101.6 kg (224 lb)   BMI 38.45 kg/m²       Constitutional  Well-developed, well-nourished, appears stated age - moderate obesity   Cardiovascular  Radial pulses 2+ and symmetric, no LE edema bilaterally   Neurological    * Mental status       - Orientation  Drowsier than usual, but oriented to person, place, time, and situation     - Memory   Not formally tested     - Attention/concentration  Inattentive, tangential in conversation     - Language  Naming & repetition intact, mild paraphasis errors and less prosody than usual     - Fund of knowledge  Aware of current events, but limited discussion     - Executive  Moderatly-organized thoughts     - Other     * Cranial nerves       - CN II  PERRL, visual fields full to confrontation     - CN III, IV, VI  Extraocular movements full, still with B hypermetric saccades, + overshoots the midline     - CN V  Sensation V1 - V3 intact     - CN VII  Mild R dim NLF - stable     - CN VIII  Hearing intact bilaterally     - CN IX, X  Palate raises midline and symmetric     - CN XI  SCM and trapezius 5/5 " bilaterally     - CN XII  Tongue midline   * Motor  5/5 strength throughout    * Sensory   Intact to temperature and vibration throughout   * Coordination  No dysmetria on FNF and HTS   + slight lack of check of limbs B   * Gait  Slow to arise, narrow-based (knock-knees) but can tandem wlak   * Deep tendon reflexes  3+ and symmetric throughout     Laboratory/Radiological:  - Results:  No visits with results within 3 Month(s) from this visit.   Latest known visit with results is:   Admission on 05/09/2019, Discharged on 05/11/2019   Component Date Value Ref Range Status    Potassium 05/09/2019 3.4* 3.5 - 5.1 mmol/L Final    POC Preg Test, Ur 05/09/2019 Negative  Negative Final     Acceptable 05/09/2019 Yes   Final    POCT Glucose 05/09/2019 106  70 - 110 mg/dL Final    WBC 05/10/2019 20.91* 3.90 - 12.70 K/uL Final    RBC 05/10/2019 3.63* 4.00 - 5.40 M/uL Final    Hemoglobin 05/10/2019 7.5* 12.0 - 16.0 g/dL Final    Hematocrit 05/10/2019 25.8* 37.0 - 48.5 % Final    Mean Corpuscular Volume 05/10/2019 71* 82 - 98 fL Final    Mean Corpuscular Hemoglobin 05/10/2019 20.7* 27.0 - 31.0 pg Final    Mean Corpuscular Hemoglobin Conc 05/10/2019 29.1* 32.0 - 36.0 g/dL Final    RDW 05/10/2019 19.4* 11.5 - 14.5 % Final    Platelets 05/10/2019 424* 150 - 350 K/uL Final    MPV 05/10/2019 8.8* 9.2 - 12.9 fL Final    Gran # (ANC) 05/10/2019 17.4* 1.8 - 7.7 K/uL Final    Lymph # 05/10/2019 1.8  1.0 - 4.8 K/uL Final    Mono # 05/10/2019 1.6* 0.3 - 1.0 K/uL Final    Eos # 05/10/2019 0.0  0.0 - 0.5 K/uL Final    Baso # 05/10/2019 0.02  0.00 - 0.20 K/uL Final    Gran% 05/10/2019 83.8* 38.0 - 73.0 % Final    Lymph% 05/10/2019 8.4* 18.0 - 48.0 % Final    Mono% 05/10/2019 7.7  4.0 - 15.0 % Final    Eosinophil% 05/10/2019 0.0  0.0 - 8.0 % Final    Basophil% 05/10/2019 0.1  0.0 - 1.9 % Final    Platelet Estimate 05/10/2019 Increased*  Final    Aniso 05/10/2019 Moderate   Final    Poik 05/10/2019 Slight    Final    Poly 05/10/2019 Occasional   Final    Hypo 05/10/2019 Moderate   Final    Target Cells 05/10/2019 Occasional   Final    Large/Giant Platelets 05/10/2019 Present   Final    Differential Method 05/10/2019 Automated   Final     Long Chain Fatty Acids:    Ref. Range 3/3/2016 13:01 3/3/2016 13:02   C22:0 Latest Ref Range: <=96.3 nmol/mL 59.0     C24:0 Latest Ref Range: <=91.4 nmol/mL 37.9     C26:0 Latest Ref Range: <=1.30 nmol/mL 0.25     C24:0/C22:0 Latest Ref Range: <=1.39 ratio 0.64     C26:0/C22:0 Latest Ref Range: <=0.023 ratio 0.004     Pristanic Acid Latest Ref Range: <=2.98 nmol/mL 0.04     Phytanic Acid Latest Ref Range: <=9.88 nmol/mL 0.19     Pristanic/Phytanic Latest Ref Range: <=0.39 ratio 0.21     Long Chain Fatty Acids Unknown SEE BELOW     Acetylcarnitine Latest Ref Range: 5-29 umol/L   16      Arylsulfatase A,             108           nmol/h/mg  >=62   Leukocytes   Interpretation                                                 In this sample, arylsulfatase A activity is not reduced. This result indicates that this individual is not affected with either metachromatic leukodystrophy (MLD) or multiple sulfatase deficiency. If the clinical presentation is suggestive of MLD, consider urine sulfatide analysis (MML test CTSA) which is expected to be abnormal in patients with saposin B deficiency due to mutations in the prosaposin gene.      Alpha-Galactosidase, Leukocytes   Reason for Referral        Not provided   -------------------ADDITIONAL INFORMATION-------------------   Fluorometric Enzyme Assay   Alpha-Galactosidase,       66.9          nmol/h/mg  >=23.1   Leukocytes   In this specimen, the activity of alpha-galactosidase is normal, indicating this patient is most likely NOT a carrier for Fabry disease (OMIM 185004). However, please note that enzyme analysis is, in general, not sufficiently sensitive to detect all carrier females (see Rashaad WL et al. Hum Mutat 2009; 30: 1-9; and Patricia MENDEZ  et al. Mol Janae Metab. 2008; 93: 112-128). Molecular genetic analysis of the GLA gene (MML test FABRZ/Fabry Disease, Full Gene Analysis) is the most reliable method of detecting carriers. If not already ordered, this could be done on the existing sample by calling MML within one month.      Ceramide Trihex and Sulfatide, U   In this sample, the pattern of elevated excretions of ceramide trihexoside species is suggestive of a biochemical diagnosis of Fabry disease (OMIM 440705). Please contact the Biochemical Genetics consultant or genetic counselor on call ([phone] if you have any questions.      Other Labs: abnormal bolded  Carnitine Free 35  Carnitine Plasma 51  Pyruvic acid 0.047  HIV pcr negative  Ceruloplasmin 31  Copper, serum  Aldolase 12.8 (H)  Ammonia 34  ACE 29  Anti-thyroglobulin ab <4.0  Thiamine 28 (L)  Vitamin B6 20  Vitamin B2 3  Vitamin B3 2.31  Folate 12.8  NMO negative        OUTSIDE CSF (2/12/16):  WCB 8  CBC 32  Gluc 71  Prot 19  CMV neg  HSV1 igg 0.06  HSV2 igG 0.63  Serum hiv neg (unknown what specific test)    - Independent review of images:            MRI Brain 3/29/17:                  Diagnoses:          Leukodystrophy - has been steroid responsive and has been holding on no immunosuppressants.  Could this have been a CLIPPERS (chronic lymphocytic inflammation with pontine perivascular enhancement responsive to steroids) - though never very enhancing...    She has worsened gradually over 6 months, and now more acutely over the last several weeks.  Not entirely clear on time frame.  Did not follow-up with testing after last visit.  Has symptoms that may be referable to a UTI now.  Also panic attacks that might be part of her neuropsychiatric symptoms versus some cervicothoracic lesion?    Medical Decision Making:  - labs today  -  consiider outpt mri versus admit for steroids if no pseudoexacerbation  - SEARCH FOR UTI

## 2020-02-17 ENCOUNTER — PATIENT MESSAGE (OUTPATIENT)
Dept: NEUROLOGY | Facility: CLINIC | Age: 35
End: 2020-02-17

## 2020-02-18 RX ORDER — SULFAMETHOXAZOLE AND TRIMETHOPRIM 800; 160 MG/1; MG/1
1 TABLET ORAL 2 TIMES DAILY
Qty: 14 TABLET | Refills: 0 | Status: SHIPPED | OUTPATIENT
Start: 2020-02-18

## 2020-08-19 ENCOUNTER — PATIENT MESSAGE (OUTPATIENT)
Dept: NEUROLOGY | Facility: CLINIC | Age: 35
End: 2020-08-19

## 2020-08-19 NOTE — TELEPHONE ENCOUNTER
Called patient no answer voicemail left informing her Dr. Du does not see for that and she should get in touch with her PCP.

## 2021-06-02 DIAGNOSIS — G93.49 LEUKOENCEPHALOPATHY: ICD-10-CM

## 2021-06-02 RX ORDER — BACLOFEN 20 MG/1
20 TABLET ORAL 3 TIMES DAILY
Qty: 90 TABLET | Refills: 5 | Status: SHIPPED | OUTPATIENT
Start: 2021-06-02 | End: 2021-12-09 | Stop reason: SDUPTHER

## 2021-06-04 RX ORDER — TRAZODONE HYDROCHLORIDE 50 MG/1
50 TABLET ORAL NIGHTLY
Qty: 30 TABLET | Refills: 3 | Status: SHIPPED | OUTPATIENT
Start: 2021-06-04 | End: 2021-12-09 | Stop reason: SDUPTHER

## 2021-06-04 RX ORDER — GABAPENTIN 300 MG/1
300 CAPSULE ORAL 2 TIMES DAILY
Qty: 60 CAPSULE | Refills: 3 | Status: SHIPPED | OUTPATIENT
Start: 2021-06-04 | End: 2022-11-11 | Stop reason: SDUPTHER

## 2021-06-04 RX ORDER — PAROXETINE HYDROCHLORIDE 20 MG/1
20 TABLET, FILM COATED ORAL EVERY MORNING
Qty: 30 TABLET | Refills: 3 | Status: SHIPPED | OUTPATIENT
Start: 2021-06-04 | End: 2021-12-09 | Stop reason: SDUPTHER

## 2021-12-09 DIAGNOSIS — G93.49 LEUKOENCEPHALOPATHY: ICD-10-CM

## 2021-12-09 RX ORDER — TRAZODONE HYDROCHLORIDE 50 MG/1
50 TABLET ORAL NIGHTLY
Qty: 30 TABLET | Refills: 3 | Status: SHIPPED | OUTPATIENT
Start: 2021-12-09 | End: 2022-11-11 | Stop reason: SDUPTHER

## 2021-12-09 RX ORDER — PAROXETINE HYDROCHLORIDE 20 MG/1
20 TABLET, FILM COATED ORAL EVERY MORNING
Qty: 30 TABLET | Refills: 3 | Status: SHIPPED | OUTPATIENT
Start: 2021-12-09 | End: 2022-11-11 | Stop reason: SDUPTHER

## 2021-12-09 RX ORDER — BACLOFEN 20 MG/1
20 TABLET ORAL 3 TIMES DAILY
Qty: 90 TABLET | Refills: 5 | Status: SHIPPED | OUTPATIENT
Start: 2021-12-09 | End: 2022-11-11 | Stop reason: SDUPTHER

## 2021-12-09 NOTE — TELEPHONE ENCOUNTER
Spoke to patient appointment placed on hold will show up in [employer] one week before the appointment date and time.

## 2021-12-09 NOTE — TELEPHONE ENCOUNTER
----- Message from Rosey Cantrell sent at 12/9/2021 10:30 AM CST -----  Contact: self @ [phone]  Pt is calling to schedule a f/u appt but there is nothing available.  Pls call with an appt.

## 2021-12-09 NOTE — TELEPHONE ENCOUNTER
----- Message from Rosey Cantrell sent at 12/9/2021 10:30 AM CST -----  Contact: self @ [phone]  Pt is calling to schedule a f/u appt but there is nothing available.  Pls call with an appt.

## 2021-12-09 NOTE — TELEPHONE ENCOUNTER
----- Message from Rosey Cantrell sent at 12/9/2021 10:30 AM CST -----  Contact: self @ [phone]  Pt is calling to schedule a f/u appt but there is nothing available.  Pls call with an appt.

## 2021-12-21 ENCOUNTER — PATIENT MESSAGE (OUTPATIENT)
Dept: NEUROLOGY | Facility: CLINIC | Age: 36
End: 2021-12-21
Payer: MEDICAID

## 2021-12-27 ENCOUNTER — TELEPHONE (OUTPATIENT)
Dept: NEUROLOGY | Facility: CLINIC | Age: 36
End: 2021-12-27
Payer: MEDICAID

## 2021-12-27 NOTE — TELEPHONE ENCOUNTER
----- Message from Jaylin Dodson sent at 12/27/2021 11:47 AM CST -----  Regarding: speak with nurse  Contact: patient  [phone]    please call patient ASAP said it is very important need to speak with the nurse concerning appointment waiting on a call back thanks.

## 2022-10-24 ENCOUNTER — PATIENT MESSAGE (OUTPATIENT)
Dept: NEUROLOGY | Facility: CLINIC | Age: 37
End: 2022-10-24
Payer: MEDICAID

## 2022-11-10 ENCOUNTER — PATIENT MESSAGE (OUTPATIENT)
Dept: NEUROLOGY | Facility: CLINIC | Age: 37
End: 2022-11-10
Payer: MEDICAID

## 2022-11-10 DIAGNOSIS — G93.49 LEUKOENCEPHALOPATHY: ICD-10-CM

## 2022-11-11 ENCOUNTER — TELEPHONE (OUTPATIENT)
Dept: NEUROLOGY | Facility: CLINIC | Age: 37
End: 2022-11-11

## 2022-11-11 RX ORDER — PAROXETINE HYDROCHLORIDE 20 MG/1
20 TABLET, FILM COATED ORAL EVERY MORNING
Qty: 30 TABLET | Refills: 3 | Status: SHIPPED | OUTPATIENT
Start: 2022-11-11

## 2022-11-11 RX ORDER — BACLOFEN 20 MG/1
20 TABLET ORAL 3 TIMES DAILY
Qty: 90 TABLET | Refills: 5 | Status: SHIPPED | OUTPATIENT
Start: 2022-11-11 | End: 2023-11-11

## 2022-11-11 RX ORDER — TRAZODONE HYDROCHLORIDE 50 MG/1
50 TABLET ORAL NIGHTLY
Qty: 30 TABLET | Refills: 3 | Status: SHIPPED | OUTPATIENT
Start: 2022-11-11

## 2022-11-11 RX ORDER — GABAPENTIN 300 MG/1
300 CAPSULE ORAL 2 TIMES DAILY
Qty: 60 CAPSULE | Refills: 3 | Status: SHIPPED | OUTPATIENT
Start: 2022-11-11 | End: 2023-11-11

## 2022-11-11 NOTE — TELEPHONE ENCOUNTER
Pt last seen 2/20. Requesting multiple refills and f/u with DJ. Advised f/u likely won't be until March or later.

## 2022-11-11 NOTE — TELEPHONE ENCOUNTER
Spoke briefly with Richard to begin scheduling follow up visit with Dr. Du. We settled on next soonest virtual visit with Dr. Du 1/19/23 at 2:20 PM.

## 2023-01-19 ENCOUNTER — TELEPHONE (OUTPATIENT)
Dept: NEUROLOGY | Facility: CLINIC | Age: 38
End: 2023-01-19
Payer: MEDICARE

## 2023-03-11 NOTE — ED NOTES
Patient identifiers verified and correct for Richard Perales.    LOC: The patient is awake, alert and aware of environment with an appropriate affect, the patient is oriented x 3 and speaking appropriately.  APPEARANCE: Patient resting comfortably and in no acute distress, patient is clean and well groomed, patient's clothing is properly fastened.  SKIN: The skin is warm and dry, color consistent with ethnicity, patient has normal skin turgor and moist mucus membranes, skin intact, no breakdown or bruising noted.  MUSCULOSKELETAL: Patient moving all extremities spontaneously, no obvious swelling or deformities noted.  RESPIRATORY: Airway is open and patent, respirations are spontaneous, patient has a normal effort and rate, no accessory muscle use noted, clear bilateral breath sounds.  CARDIAC: Patient has a normal rate and regular rhythm, no periphreal edema noted, capillary refill < 3 seconds.  ABDOMEN: Soft and non tender to palpation, no distention noted, normoactive bowel sounds present in all four quadrants.  NEUROLOGIC: PERRL, 3mm bilaterally, eyes open spontaneously, behavior appropriate to situation, follows commands, facial expression symmetrical, bilateral hand grasp equal and even, purposeful motor response noted, normal sensation in all extremities when touched with a finger.   show

## 2024-08-09 ENCOUNTER — HOSPITAL ENCOUNTER (OUTPATIENT)
Facility: HOSPITAL | Age: 39
Discharge: HOME | End: 2024-08-10
Attending: EMERGENCY MEDICINE | Admitting: INTERNAL MEDICINE
Payer: MEDICARE

## 2024-08-09 DIAGNOSIS — R20.2 PARESTHESIAS: ICD-10-CM

## 2024-08-09 DIAGNOSIS — E87.6 HYPOKALEMIA: ICD-10-CM

## 2024-08-09 DIAGNOSIS — R60.9 EDEMA, UNSPECIFIED TYPE: ICD-10-CM

## 2024-08-09 DIAGNOSIS — D64.9 ANEMIA, UNSPECIFIED TYPE: ICD-10-CM

## 2024-08-09 DIAGNOSIS — G31.80 LEUKODYSTROPHY: Primary | ICD-10-CM

## 2024-08-09 DIAGNOSIS — F10.129 ALCOHOL ABUSE WITH INTOXICATION: ICD-10-CM

## 2024-08-09 PROCEDURE — 99285 EMERGENCY DEPT VISIT HI MDM: CPT

## 2024-08-09 PROCEDURE — 81025 URINE PREGNANCY TEST: CPT | Performed by: EMERGENCY MEDICINE

## 2024-08-10 VITALS
WEIGHT: 221.13 LBS | SYSTOLIC BLOOD PRESSURE: 150 MMHG | DIASTOLIC BLOOD PRESSURE: 93 MMHG | OXYGEN SATURATION: 100 % | HEART RATE: 77 BPM | BODY MASS INDEX: 37.75 KG/M2 | TEMPERATURE: 98 F | RESPIRATION RATE: 16 BRPM | HEIGHT: 64 IN

## 2024-08-10 PROBLEM — E87.6 HYPOKALEMIA: Status: ACTIVE | Noted: 2024-08-10

## 2024-08-10 PROBLEM — E87.6 HYPOKALEMIA: Status: RESOLVED | Noted: 2024-08-10 | Resolved: 2024-08-10

## 2024-08-10 LAB
25(OH)D3+25(OH)D2 SERPL-MCNC: 8 NG/ML (ref 30–80)
ALBUMIN SERPL-MCNC: 3.3 G/DL (ref 3.5–5)
ALBUMIN SERPL-MCNC: 3.5 G/DL (ref 3.5–5)
ALBUMIN/GLOB SERPL: 0.8 RATIO (ref 1.1–2)
ALBUMIN/GLOB SERPL: 0.9 RATIO (ref 1.1–2)
ALP SERPL-CCNC: 109 UNIT/L (ref 40–150)
ALP SERPL-CCNC: 117 UNIT/L (ref 40–150)
ALT SERPL-CCNC: 17 UNIT/L (ref 0–55)
ALT SERPL-CCNC: 18 UNIT/L (ref 0–55)
AMPHET UR QL SCN: NEGATIVE
ANION GAP SERPL CALC-SCNC: 13 MEQ/L
ANION GAP SERPL CALC-SCNC: 13 MEQ/L
AST SERPL-CCNC: 16 UNIT/L (ref 5–34)
AST SERPL-CCNC: 20 UNIT/L (ref 5–34)
B-HCG UR QL: NEGATIVE
BACTERIA #/AREA URNS AUTO: ABNORMAL /HPF
BARBITURATE SCN PRESENT UR: NEGATIVE
BASOPHILS # BLD AUTO: 0.04 X10(3)/MCL
BASOPHILS # BLD AUTO: 0.04 X10(3)/MCL
BASOPHILS NFR BLD AUTO: 0.6 %
BASOPHILS NFR BLD AUTO: 0.7 %
BENZODIAZ UR QL SCN: NEGATIVE
BILIRUB SERPL-MCNC: 0.2 MG/DL
BILIRUB SERPL-MCNC: 0.2 MG/DL
BILIRUB UR QL STRIP.AUTO: NEGATIVE
BUN SERPL-MCNC: 4.7 MG/DL (ref 7–18.7)
BUN SERPL-MCNC: 4.8 MG/DL (ref 7–18.7)
CALCIUM SERPL-MCNC: 8.4 MG/DL (ref 8.4–10.2)
CALCIUM SERPL-MCNC: 9.2 MG/DL (ref 8.4–10.2)
CANNABINOIDS UR QL SCN: NEGATIVE
CHLORIDE SERPL-SCNC: 108 MMOL/L (ref 98–107)
CHLORIDE SERPL-SCNC: 110 MMOL/L (ref 98–107)
CLARITY UR: CLEAR
CO2 SERPL-SCNC: 21 MMOL/L (ref 22–29)
CO2 SERPL-SCNC: 21 MMOL/L (ref 22–29)
COCAINE UR QL SCN: NEGATIVE
COLOR UR AUTO: COLORLESS
CREAT SERPL-MCNC: 0.81 MG/DL (ref 0.55–1.02)
CREAT SERPL-MCNC: 0.83 MG/DL (ref 0.55–1.02)
CREAT/UREA NIT SERPL: 6
CREAT/UREA NIT SERPL: 6
CTP QC/QA: YES
EOSINOPHIL # BLD AUTO: 0.06 X10(3)/MCL (ref 0–0.9)
EOSINOPHIL # BLD AUTO: 0.09 X10(3)/MCL (ref 0–0.9)
EOSINOPHIL NFR BLD AUTO: 1 %
EOSINOPHIL NFR BLD AUTO: 1.3 %
ERYTHROCYTE [DISTWIDTH] IN BLOOD BY AUTOMATED COUNT: 21.5 % (ref 11.5–17)
ERYTHROCYTE [DISTWIDTH] IN BLOOD BY AUTOMATED COUNT: 22 % (ref 11.5–17)
ETHANOL SERPL-MCNC: 270 MG/DL
FENTANYL UR QL SCN: NEGATIVE
FERRITIN SERPL-MCNC: 23.07 NG/ML (ref 4.63–204)
FOLATE SERPL-MCNC: 51 NG/ML (ref 7–31.4)
GFR SERPLBLD CREATININE-BSD FMLA CKD-EPI: >60 ML/MIN/1.73/M2
GFR SERPLBLD CREATININE-BSD FMLA CKD-EPI: >60 ML/MIN/1.73/M2
GLOBULIN SER-MCNC: 3.7 GM/DL (ref 2.4–3.5)
GLOBULIN SER-MCNC: 4.2 GM/DL (ref 2.4–3.5)
GLUCOSE SERPL-MCNC: 125 MG/DL (ref 74–100)
GLUCOSE SERPL-MCNC: 126 MG/DL (ref 74–100)
GLUCOSE UR QL STRIP: NORMAL
HAV IGM SERPL QL IA: NONREACTIVE
HBV CORE IGM SERPL QL IA: NONREACTIVE
HBV SURFACE AG SERPL QL IA: NONREACTIVE
HCT VFR BLD AUTO: 31.7 % (ref 37–47)
HCT VFR BLD AUTO: 32.4 % (ref 37–47)
HCV AB SERPL QL IA: NONREACTIVE
HGB BLD-MCNC: 10.1 G/DL (ref 12–16)
HGB BLD-MCNC: 10.1 G/DL (ref 12–16)
HGB UR QL STRIP: NEGATIVE
HIV 1+2 AB+HIV1 P24 AG SERPL QL IA: NONREACTIVE
HYALINE CASTS #/AREA URNS LPF: ABNORMAL /LPF
IMM GRANULOCYTES # BLD AUTO: 0.01 X10(3)/MCL (ref 0–0.04)
IMM GRANULOCYTES # BLD AUTO: 0.02 X10(3)/MCL (ref 0–0.04)
IMM GRANULOCYTES NFR BLD AUTO: 0.1 %
IMM GRANULOCYTES NFR BLD AUTO: 0.3 %
IRON SATN MFR SERPL: 4 % (ref 20–50)
IRON SERPL-MCNC: 12 UG/DL (ref 50–170)
KETONES UR QL STRIP: NEGATIVE
LEUKOCYTE ESTERASE UR QL STRIP: NEGATIVE
LYMPHOCYTES # BLD AUTO: 2.24 X10(3)/MCL (ref 0.6–4.6)
LYMPHOCYTES # BLD AUTO: 3.55 X10(3)/MCL (ref 0.6–4.6)
LYMPHOCYTES NFR BLD AUTO: 37.6 %
LYMPHOCYTES NFR BLD AUTO: 51.7 %
MAGNESIUM SERPL-MCNC: 2.1 MG/DL (ref 1.6–2.6)
MCH RBC QN AUTO: 24.3 PG (ref 27–31)
MCH RBC QN AUTO: 25 PG (ref 27–31)
MCHC RBC AUTO-ENTMCNC: 31.2 G/DL (ref 33–36)
MCHC RBC AUTO-ENTMCNC: 31.9 G/DL (ref 33–36)
MCV RBC AUTO: 77.9 FL (ref 80–94)
MCV RBC AUTO: 78.5 FL (ref 80–94)
MDMA UR QL SCN: NEGATIVE
MONOCYTES # BLD AUTO: 0.33 X10(3)/MCL (ref 0.1–1.3)
MONOCYTES # BLD AUTO: 0.63 X10(3)/MCL (ref 0.1–1.3)
MONOCYTES NFR BLD AUTO: 5.5 %
MONOCYTES NFR BLD AUTO: 9.2 %
NEUTROPHILS # BLD AUTO: 2.55 X10(3)/MCL (ref 2.1–9.2)
NEUTROPHILS # BLD AUTO: 3.27 X10(3)/MCL (ref 2.1–9.2)
NEUTROPHILS NFR BLD AUTO: 37.1 %
NEUTROPHILS NFR BLD AUTO: 54.9 %
NITRITE UR QL STRIP: NEGATIVE
NRBC BLD AUTO-RTO: 0 %
NRBC BLD AUTO-RTO: 0 %
OPIATES UR QL SCN: NEGATIVE
PCP UR QL: NEGATIVE
PH UR STRIP: 6 [PH]
PH UR: 6 [PH] (ref 3–11)
PLATELET # BLD AUTO: 399 X10(3)/MCL (ref 130–400)
PLATELET # BLD AUTO: 420 X10(3)/MCL (ref 130–400)
PMV BLD AUTO: 8.7 FL (ref 7.4–10.4)
PMV BLD AUTO: 9 FL (ref 7.4–10.4)
POTASSIUM SERPL-SCNC: 2.7 MMOL/L (ref 3.5–5.1)
POTASSIUM SERPL-SCNC: 3.3 MMOL/L (ref 3.5–5.1)
PROT SERPL-MCNC: 7 GM/DL (ref 6.4–8.3)
PROT SERPL-MCNC: 7.7 GM/DL (ref 6.4–8.3)
PROT UR QL STRIP: NEGATIVE
RBC # BLD AUTO: 4.04 X10(6)/MCL (ref 4.2–5.4)
RBC # BLD AUTO: 4.16 X10(6)/MCL (ref 4.2–5.4)
RBC #/AREA URNS AUTO: ABNORMAL /HPF
SODIUM SERPL-SCNC: 142 MMOL/L (ref 136–145)
SODIUM SERPL-SCNC: 144 MMOL/L (ref 136–145)
SP GR UR STRIP.AUTO: <1.005 (ref 1–1.03)
SPECIFIC GRAVITY, URINE AUTO (.000) (OHS): 1 (ref 1–1.03)
SQUAMOUS #/AREA URNS LPF: ABNORMAL /HPF
T PALLIDUM AB SER QL: NONREACTIVE
TIBC SERPL-MCNC: 322 UG/DL (ref 70–310)
TIBC SERPL-MCNC: 334 UG/DL (ref 250–450)
TRANSFERRIN SERPL-MCNC: 326 MG/DL (ref 180–382)
TSH SERPL-ACNC: 1.02 UIU/ML (ref 0.35–4.94)
UROBILINOGEN UR STRIP-ACNC: NORMAL
VIT B12 SERPL-MCNC: 728 PG/ML (ref 213–816)
WBC # BLD AUTO: 5.96 X10(3)/MCL (ref 4.5–11.5)
WBC # BLD AUTO: 6.87 X10(3)/MCL (ref 4.5–11.5)
WBC #/AREA URNS AUTO: ABNORMAL /HPF

## 2024-08-10 PROCEDURE — 86780 TREPONEMA PALLIDUM: CPT

## 2024-08-10 PROCEDURE — 25000003 PHARM REV CODE 250

## 2024-08-10 PROCEDURE — 85025 COMPLETE CBC W/AUTO DIFF WBC: CPT | Performed by: EMERGENCY MEDICINE

## 2024-08-10 PROCEDURE — 81001 URINALYSIS AUTO W/SCOPE: CPT | Mod: XB | Performed by: EMERGENCY MEDICINE

## 2024-08-10 PROCEDURE — 63600175 PHARM REV CODE 636 W HCPCS: Performed by: EMERGENCY MEDICINE

## 2024-08-10 PROCEDURE — 82607 VITAMIN B-12: CPT

## 2024-08-10 PROCEDURE — 82306 VITAMIN D 25 HYDROXY: CPT

## 2024-08-10 PROCEDURE — 97162 PT EVAL MOD COMPLEX 30 MIN: CPT

## 2024-08-10 PROCEDURE — 83735 ASSAY OF MAGNESIUM: CPT | Performed by: EMERGENCY MEDICINE

## 2024-08-10 PROCEDURE — 96375 TX/PRO/DX INJ NEW DRUG ADDON: CPT

## 2024-08-10 PROCEDURE — 80053 COMPREHEN METABOLIC PANEL: CPT | Performed by: EMERGENCY MEDICINE

## 2024-08-10 PROCEDURE — 63600175 PHARM REV CODE 636 W HCPCS

## 2024-08-10 PROCEDURE — 80053 COMPREHEN METABOLIC PANEL: CPT | Mod: 91

## 2024-08-10 PROCEDURE — 80074 ACUTE HEPATITIS PANEL: CPT

## 2024-08-10 PROCEDURE — 83550 IRON BINDING TEST: CPT

## 2024-08-10 PROCEDURE — G0378 HOSPITAL OBSERVATION PER HR: HCPCS

## 2024-08-10 PROCEDURE — 96366 THER/PROPH/DIAG IV INF ADDON: CPT

## 2024-08-10 PROCEDURE — 83540 ASSAY OF IRON: CPT

## 2024-08-10 PROCEDURE — 82077 ASSAY SPEC XCP UR&BREATH IA: CPT | Performed by: EMERGENCY MEDICINE

## 2024-08-10 PROCEDURE — 85025 COMPLETE CBC W/AUTO DIFF WBC: CPT | Mod: 91

## 2024-08-10 PROCEDURE — 87389 HIV-1 AG W/HIV-1&-2 AB AG IA: CPT

## 2024-08-10 PROCEDURE — 82746 ASSAY OF FOLIC ACID SERUM: CPT

## 2024-08-10 PROCEDURE — 80307 DRUG TEST PRSMV CHEM ANLYZR: CPT | Performed by: EMERGENCY MEDICINE

## 2024-08-10 PROCEDURE — 94761 N-INVAS EAR/PLS OXIMETRY MLT: CPT

## 2024-08-10 PROCEDURE — 84443 ASSAY THYROID STIM HORMONE: CPT

## 2024-08-10 PROCEDURE — 96365 THER/PROPH/DIAG IV INF INIT: CPT

## 2024-08-10 PROCEDURE — 25000003 PHARM REV CODE 250: Performed by: EMERGENCY MEDICINE

## 2024-08-10 PROCEDURE — 36415 COLL VENOUS BLD VENIPUNCTURE: CPT

## 2024-08-10 PROCEDURE — 82728 ASSAY OF FERRITIN: CPT

## 2024-08-10 RX ORDER — SODIUM CHLORIDE 0.9 % (FLUSH) 0.9 %
10 SYRINGE (ML) INJECTION
Status: DISCONTINUED | OUTPATIENT
Start: 2024-08-10 | End: 2024-08-10 | Stop reason: HOSPADM

## 2024-08-10 RX ORDER — LORAZEPAM 2 MG/ML
2 INJECTION INTRAMUSCULAR
Status: DISCONTINUED | OUTPATIENT
Start: 2024-08-10 | End: 2024-08-10 | Stop reason: HOSPADM

## 2024-08-10 RX ORDER — PAROXETINE HYDROCHLORIDE 20 MG/1
20 TABLET, FILM COATED ORAL EVERY MORNING
Status: DISCONTINUED | OUTPATIENT
Start: 2024-08-10 | End: 2024-08-10 | Stop reason: HOSPADM

## 2024-08-10 RX ORDER — POTASSIUM CHLORIDE 7.45 MG/ML
10 INJECTION INTRAVENOUS
Status: COMPLETED | OUTPATIENT
Start: 2024-08-10 | End: 2024-08-10

## 2024-08-10 RX ORDER — POTASSIUM CHLORIDE 20 MEQ/1
40 TABLET, EXTENDED RELEASE ORAL ONCE
Status: COMPLETED | OUTPATIENT
Start: 2024-08-10 | End: 2024-08-10

## 2024-08-10 RX ORDER — POTASSIUM CHLORIDE 20 MEQ/1
40 TABLET, EXTENDED RELEASE ORAL
Status: COMPLETED | OUTPATIENT
Start: 2024-08-10 | End: 2024-08-10

## 2024-08-10 RX ORDER — TRAZODONE HYDROCHLORIDE 50 MG/1
50 TABLET ORAL ONCE AS NEEDED
Status: DISCONTINUED | OUTPATIENT
Start: 2024-08-10 | End: 2024-08-10 | Stop reason: HOSPADM

## 2024-08-10 RX ORDER — ENOXAPARIN SODIUM 100 MG/ML
40 INJECTION SUBCUTANEOUS EVERY 24 HOURS
Status: DISCONTINUED | OUTPATIENT
Start: 2024-08-10 | End: 2024-08-10 | Stop reason: HOSPADM

## 2024-08-10 RX ADMIN — POTASSIUM CHLORIDE 10 MEQ: 7.46 INJECTION, SOLUTION INTRAVENOUS at 04:08

## 2024-08-10 RX ADMIN — FOLIC ACID: 5 INJECTION, SOLUTION INTRAMUSCULAR; INTRAVENOUS; SUBCUTANEOUS at 02:08

## 2024-08-10 RX ADMIN — POTASSIUM CHLORIDE 10 MEQ: 7.46 INJECTION, SOLUTION INTRAVENOUS at 01:08

## 2024-08-10 RX ADMIN — POTASSIUM CHLORIDE 40 MEQ: 1500 TABLET, EXTENDED RELEASE ORAL at 01:08

## 2024-08-10 RX ADMIN — PAROXETINE HYDROCHLORIDE 20 MG: 20 TABLET, FILM COATED ORAL at 06:08

## 2024-08-10 RX ADMIN — POTASSIUM CHLORIDE 40 MEQ: 1500 TABLET, EXTENDED RELEASE ORAL at 08:08

## 2024-08-10 NOTE — PLAN OF CARE
Problem: Adult Inpatient Plan of Care  Goal: Plan of Care Review  Outcome: Met  Goal: Patient-Specific Goal (Individualized)  Outcome: Met  Goal: Absence of Hospital-Acquired Illness or Injury  Outcome: Met  Goal: Optimal Comfort and Wellbeing  Outcome: Met  Goal: Readiness for Transition of Care  Outcome: Met     Problem: Fatigue  Goal: Improved Activity Tolerance  Outcome: Met     Problem: Fall Injury Risk  Goal: Absence of Fall and Fall-Related Injury  Outcome: Met     Problem: Electrolyte Imbalance  Goal: Electrolyte Balance  Outcome: Met

## 2024-08-10 NOTE — ED PROVIDER NOTES
Encounter Date: 8/9/2024       History     Chief Complaint   Patient presents with    Numbness     Patient reports not being able to feel her legs for the last 2 months but can feel her feet. Reports history of leukodystrophy. Also states she feels the sensation that she's been drinking but she has not.      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  She lives [place] and is interested in being followed by Neurology here at our facility.  She has not been on steroids apparently in about 2 years, she reports that she was pregnant and had a successful pregnancy in 2022 and has not been on steroids since before that time.  At baseline she is able to walk unassisted but has some weakness in her legs, she is able to drive and in fact drove herself here tonight.  She has not apparently been seen recently for any of her conditions but has chronic ongoing bilateral lower extremity edema and a sense of numbness or decreased feeling in both legs not involving the feet.  These symptoms have been present on and off for at least 2 months.  She also intermittently has a sensation that she feels a little intoxicated but has not had any alcohol to drink in 2 days.  There does not seem to be any acute complaint and it is not clear why she came to the emergency room late at night on Friday for these chronic conditions.    The history is provided by the patient and medical records. No  was used.     Review of patient's allergies indicates:  No Known Allergies  Past Medical History:   Diagnosis Date    Depression     Leukodystrophy     Neurologic disorder      Past Surgical History:   Procedure Laterality Date    MYOMECTOMY N/A 5/9/2019    Procedure: MYOMECTOMY OPEN;  Surgeon: Jennyfer Ibarra MD;  Location: [facility] OR;   Service: OB/GYN;  Laterality: N/A;  EXPAREL     Family History   Problem Relation Name Age of Onset    Other Other Maternal great Uncle         in a wheelchair for unkown reasons to patient      Social History     Tobacco Use    Smoking status: Every Day    Smokeless tobacco: Never   Substance Use Topics    Alcohol use: Yes     Comment: rarely     Drug use: Never     Review of Systems   Cardiovascular:  Positive for leg swelling.   Neurological:  Positive for numbness.       Physical Exam     Initial Vitals [08/09/24 2325]   BP Pulse Resp Temp SpO2   (!) 132/91 103 18 98.1 °F (36.7 °C) 100 %      MAP       --         Physical Exam    Nursing note and vitals reviewed.  Constitutional: She appears well-developed and well-nourished. No distress.   HENT:   Head: Normocephalic and atraumatic.   Eyes: EOM are normal. Pupils are equal, round, and reactive to light.   Neck: Neck supple. No thyromegaly present.   Normal range of motion.  Cardiovascular:  Normal rate and regular rhythm.           Pulmonary/Chest: Breath sounds normal. No respiratory distress.   Abdominal: Abdomen is soft. Bowel sounds are normal.   Musculoskeletal:         General: Edema present.      Cervical back: Normal range of motion and neck supple.      Comments: Mild soft pitting symmetric bilateral foot and ankle edema     Neurological: She is alert and oriented to person, place, and time.   Mild dysarthria at baseline per patient.  Symmetric neurologic exam, she has mild hip flexor and lower extremity major muscle group weakness but is able to sit on her own, stand on her own, and walk on her own unassisted.  She reports a sense of decreased sensitivity to light touch across both legs and thighs diffusely.         ED Course   Procedures  Labs Reviewed   COMPREHENSIVE METABOLIC PANEL - Abnormal       Result Value    Sodium 142      Potassium 2.7 (*)     Chloride 108 (*)     CO2 21 (*)     Glucose 125 (*)     Blood Urea Nitrogen 4.8 (*)     Creatinine  0.81      Calcium 9.2      Protein Total 7.7      Albumin 3.5      Globulin 4.2 (*)     Albumin/Globulin Ratio 0.8 (*)     Bilirubin Total 0.2            ALT 17      AST 20      eGFR >60      Anion Gap 13.0      BUN/Creatinine Ratio 6     URINALYSIS, REFLEX TO URINE CULTURE - Abnormal    Color, UA Colorless (*)     Appearance, UA Clear      Specific Gravity, UA <1.005 (*)     pH, UA 6.0      Protein, UA Negative      Glucose, UA Normal      Ketones, UA Negative      Blood, UA Negative      Bilirubin, UA Negative      Urobilinogen, UA Normal      Nitrites, UA Negative      Leukocyte Esterase, UA Negative      RBC, UA None Seen      WBC, UA None Seen      Bacteria, UA None Seen      Squamous Epithelial Cells, UA None Seen      Hyaline Casts, UA None Seen     ALCOHOL,MEDICAL (ETHANOL) - Abnormal    Ethanol Level 270.0 (*)    CBC WITH DIFFERENTIAL - Abnormal    WBC 5.96      RBC 4.04 (*)     Hgb 10.1 (*)     Hct 31.7 (*)     MCV 78.5 (*)     MCH 25.0 (*)     MCHC 31.9 (*)     RDW 21.5 (*)     Platelet 420 (*)     MPV 9.0      Neut % 54.9      Lymph % 37.6      Mono % 5.5      Eos % 1.0      Basophil % 0.7      Lymph # 2.24      Neut # 3.27      Mono # 0.33      Eos # 0.06      Baso # 0.04      IG# 0.02      IG% 0.3      NRBC% 0.0     DRUG SCREEN, URINE (BEAKER) - Normal    Amphetamines, Urine Negative      Barbiturates, Urine Negative      Benzodiazepine, Urine Negative      Cannabinoids, Urine Negative      Cocaine, Urine Negative      Fentanyl, Urine Negative      MDMA, Urine Negative      Opiates, Urine Negative      Phencyclidine, Urine Negative      pH, Urine 6.0      Specific Gravity, Urine Auto 1.003      Narrative:     Cut off concentrations:    Amphetamines - 1000 ng/ml  Barbiturates - 200 ng/ml  Benzodiazepine - 200 ng/ml  Cannabinoids (THC) - 50 ng/ml  Cocaine - 300 ng/ml  Fentanyl - 1.0 ng/ml  MDMA - 500 ng/ml  Opiates - 300 ng/ml   Phencyclidine (PCP) - 25 ng/ml    Specimen submitted for drug  analysis and tested for pH and specific gravity in order to evaluate sample integrity. Suspect tampering if specific gravity is <1.003 and/or pH is not within the range of 4.5 - 8.0  False negatives may result form substances such as bleach added to urine.  False positives may result for the presence of a substance with similar chemical structure to the drug or its metabolite.    This test provides only a PRELIMINARY analytical test result. A more specific alternate chemical method must be used in order to obtain a confirmed analytical result. Gas chromatography/mass spectrometry (GC/MS) is the preferred confirmatory method. Other chemical confirmation methods are available. Clinical consideration and professional judgement should be applied to any drug of abuse test result, particularly when preliminary positive results are used.    Positive results will be confirmed only at the physicians request. Unconfirmed screening results are to be used only for medical purposes (treatment).        MAGNESIUM - Normal    Magnesium Level 2.10     CBC W/ AUTO DIFFERENTIAL    Narrative:     The following orders were created for panel order CBC auto differential.  Procedure                               Abnormality         Status                     ---------                               -----------         ------                     CBC with Differential[[id number]]       Abnormal            Final result                 Please view results for these tests on the individual orders.   CBC W/ AUTO DIFFERENTIAL    Narrative:     The following orders were created for panel order CBC auto differential.  Procedure                               Abnormality         Status                     ---------                               -----------         ------                     CBC with Differential[[id number]]                                                        Please view results for these tests on the individual orders.    COMPREHENSIVE METABOLIC PANEL   CBC WITH DIFFERENTIAL   POCT URINE PREGNANCY    POC Preg Test, Ur Negative       Acceptable Yes            Imaging Results    None          Medications   0.9% NaCl 1,000 mL with mvi, (ADULT) no.4 with vit K 3,300 unit- 150 mcg/10 mL 10 mL, thiamine 100 mg, folic acid 1 mg infusion (has no administration in time range)   potassium chloride 10 mEq in 100 mL IVPB (10 mEq Intravenous New Bag 8/10/24 0143)   sodium chloride 0.9% flush 10 mL (has no administration in time range)   enoxaparin injection 40 mg (has no administration in time range)   potassium chloride SA CR tablet 40 mEq (40 mEq Oral Given 8/10/24 0139)       1:11 AM Confronted with laboratory results.  She admits to drinking on a regular basis, some type of can not mixed drinks in 24 oz cans, has poor recollection of when she drank most recently, believes she may have passed out and drank more that she does not remember, she seems to believe that she has not had any serious alcohol to drink in about 2 days.  Counseled regarding the severity of current findings.  Consult Internal Medicine.    Medical Decision Making  Problems Addressed:  Alcohol abuse with intoxication: acute illness or injury  Leukodystrophy: acute illness or injury    Amount and/or Complexity of Data Reviewed  Labs: ordered. Decision-making details documented in ED Course.    Risk  Prescription drug management.                                      Clinical Impression:  Final diagnoses:  [G31.80] Leukodystrophy (Primary)  [R20.2] Paresthesias  [R60.9] Edema, unspecified type  [F10.129] Alcohol abuse with intoxication  [E87.6] Hypokalemia  [D64.9] Anemia, unspecified type          ED Disposition Condition    Observation                 Jonnathan Amezquita MD  08/10/24 6614

## 2024-08-10 NOTE — PLAN OF CARE
Problem: Adult Inpatient Plan of Care  Goal: Plan of Care Review  Outcome: Progressing  Goal: Patient-Specific Goal (Individualized)  Outcome: Progressing  Goal: Absence of Hospital-Acquired Illness or Injury  Outcome: Progressing  Goal: Optimal Comfort and Wellbeing  Outcome: Progressing  Goal: Readiness for Transition of Care  Outcome: Progressing     Problem: Fatigue  Goal: Improved Activity Tolerance  Outcome: Progressing     Problem: Fall Injury Risk  Goal: Absence of Fall and Fall-Related Injury  Outcome: Progressing     Problem: Electrolyte Imbalance  Goal: Electrolyte Balance  Outcome: Progressing

## 2024-08-10 NOTE — H&P
[facility] History and Physical Note      Resident Team: [facility] Medicine List 2  Attending Physician: Tato Clemons MD  Resident: Rafa Cartagena MD  Intern: Samira Wood MD     Date of Admit: 8/9/2024  Chief Complaint   Numbness (Patient reports not being able to feel her legs for the last 2 months but can feel her feet. Reports history of leukodystrophy. Also states she feels the sensation that she's been drinking but she has not. )       DOMINIQUE Perales is a 38 y.o. female, with PMH significant for leukodystrophy, anxiety, and depression, who presented to [facility] ED on 8/9/2024  with a primary complaint of lower extremity numbness. She has a history of lower extremity numbness and weakness due to her leukodystrophy but it has worsened over the last two weeks. She states that she has intact sensation to touch but cannot feel her legs when walking. She has had two recent episodes of her legs giving out while walking, causing her to almost fall but has had something nearby to prevent her from falling. She is able to walk unassisted at home but endorses concern over not being able to carry her two year old toddler up the stairs because of her lower extremity weakness. She has had episodes like this in the past and responded well to IV steroids. She has not been on steroids in about 2 years. She reports feeling like she is intoxicated but states that the last time she drank alcohol was 2 days ago. She currently lives in [place] and drove herself to the ED tonight. She has been followed by various neurologists, most recently Dr. Du in [place]. She is looking to establishing care with a neurologist here at [facility].      In the ED, vitals upon arrival were T 98.1 F, /91, , RR 18, SpO2 100% on RA. Labs significant for Hgb 10.1, Hct 31.7, MCV 78.5, RDW 21.5, K 2.7, bicarb 21, glucose 125. Ethanol level 270. UDS negative. UA unremarkable. She was started on 0.9% NaCl  with vitamin K, thiamine, and folic acid. Potassium chloride 40 mEq PO and 20 mEq IV given. Medicine was consulted for further management.     History    PMH:  Past Medical History:   Diagnosis Date    Depression     Leukodystrophy     Neurologic disorder      Past Surgical History:   Past Surgical History:   Procedure Laterality Date    MYOMECTOMY N/A 5/9/2019    Procedure: MYOMECTOMY OPEN;  Surgeon: Jennyfer Ibarra MD;  Location: [facility];  Service: OB/GYN;  Laterality: N/A;  EXPAREL     Family History:   Family History   Problem Relation Name Age of Onset    Other Other Maternal great Uncle         in a wheelchair for unkown reasons to patient      Social:   Tobacco use: current every day smoker. One pack lasts about 4 days  Alcohol use: drinks on the weekends. Has 2 mixed drinks in a 4 hour period   Illicit drug use: denies    Allergies: Review of patient's allergies indicates:  No Known Allergies    Home Medications     Prior to Admission medications    Medication Sig Start Date End Date Taking? Authorizing Provider   acetaminophen (TYLENOL) 325 MG tablet Take 2 tablets (650 mg total) by mouth every 6 (six) hours. 4/5/17   Benji Uriarte MD   baclofen (LIORESAL) 20 MG tablet Take 1 tablet (20 mg total) by mouth 3 (three) times daily. 11/11/22 11/11/23  Braden Du MD   LISET 0.35 mg tablet TAKE 1 TABLET DAILY  SOME ANTIBIOTICS MAY REDUCE THE EFFECTIVENESS OF THIS MEDICATION 4/9/19   Provider, Historical   fluconazole (DIFLUCAN) 150 MG Tab TAKE ONE TABLET BY MOUTH EVERY DAY FOR 3 DAYS 4/15/19   Provider, Historical   gabapentin (NEURONTIN) 300 MG capsule Take 1 capsule (300 mg total) by mouth 2 (two) times daily. 11/11/22 11/11/23  Braden Du MD   ibuprofen (ADVIL,MOTRIN) 600 MG tablet Take 1 tablet (600 mg total) by mouth every 6 (six) hours as needed. 5/10/19   Manju Alcala MD   multivitamin (THERAGRAN) tablet Take 1 tablet by mouth once daily. 4/5/17   Benji Uriarte MD  "  oxyCODONE-acetaminophen (PERCOCET) 5-325 mg per tablet Take 1 tablet by mouth every 4 (four) hours as needed for Pain. 5/10/19   Manju Alcala MD   paroxetine (PAXIL) 20 MG tablet Take 1 tablet (20 mg total) by mouth every morning. 22   Braden Du MD   sulfamethoxazole-trimethoprim 800-160mg (BACTRIM DS) 800-160 mg Tab Take 1 tablet by mouth 2 (two) times daily. 20   Braden Du MD   traZODone (DESYREL) 50 MG tablet Take 1 tablet (50 mg total) by mouth every evening. 22   Braden Du MD   UNABLE TO FIND 1 tablet once daily. [facility] potassium supplement  99 mg    Provider, Historical       Review of Systems   Review of Systems   Constitutional:  Negative for chills and fever.   Respiratory:  Negative for shortness of breath.    Cardiovascular:  Negative for chest pain.   Gastrointestinal:  Negative for abdominal pain, nausea and vomiting.   Neurological:  Positive for sensory change and weakness.        Vital Signs    BP  Min: 132/91  Max: 132/91  Temp  Av.1 °F (36.7 °C)  Min: 98.1 °F (36.7 °C)  Max: 98.1 °F (36.7 °C)  Pulse  Av  Min: 103  Max: 103  Resp  Av  Min: 18  Max: 18  SpO2  Av %  Min: 100 %  Max: 100 %  Height  Av' 4" (162.6 cm)  Min: 5' 4" (162.6 cm)  Max: 5' 4" (162.6 cm)  Weight  Av.3 kg (221 lb 1.9 oz)  Min: 100.3 kg (221 lb 1.9 oz)  Max: 100.3 kg (221 lb 1.9 oz)  Body mass index is 37.96 kg/m².  No intake/output data recorded.    Physical Exam    Physical Exam  Vitals reviewed.   Constitutional:       General: She is not in acute distress.     Appearance: She is not ill-appearing.   HENT:      Mouth/Throat:      Mouth: Mucous membranes are dry.      Pharynx: Oropharynx is clear.   Eyes:      Extraocular Movements: Extraocular movements intact.      Pupils: Pupils are equal, round, and reactive to light.   Cardiovascular:      Rate and Rhythm: Normal rate and regular rhythm.      Pulses: Normal pulses.      " "Heart sounds: Normal heart sounds. No murmur heard.     Comments: Bilateral lower extremity edema L>R  Pulmonary:      Effort: Pulmonary effort is normal. No respiratory distress.      Breath sounds: Normal breath sounds. No wheezing, rhonchi or rales.   Abdominal:      General: Bowel sounds are normal. There is no distension.   Skin:     General: Skin is warm and dry.   Neurological:      Mental Status: She is alert.      Comments: CN II-XII grossly intact. Decreased sensation to touch of right dorsal foot and right anterior lower leg compared to left. 5/5 strength in BUE. 5/5 strength in dorsiflexion and plantar flexion bilaterally. Unable to fully raise legs while lying down due to weakness. Able to walk on her own, though slow and shuffling.    Psychiatric:      Comments: Slow to respond to questions. Tangential speech at times         Labs    Most Recent Data:  CBC:   Lab Results   Component Value Date    WBC 5.96 08/10/2024    HGB 10.1 (L) 08/10/2024    HCT 31.7 (L) 08/10/2024     (H) 08/10/2024    MCV 78.5 (L) 08/10/2024    RDW 21.5 (H) 08/10/2024     WBC Differential:   Recent Labs   Lab 08/10/24  0001   WBC 5.96   HGB 10.1*   HCT 31.7*   *   MCV 78.5*     BMP:   Lab Results   Component Value Date     08/10/2024    K 2.7 (LL) 08/10/2024     (H) 08/10/2024    CO2 21 (L) 08/10/2024    BUN 4.8 (L) 08/10/2024    CREATININE 0.81 08/10/2024    GLU 82 02/11/2020    CALCIUM 9.2 08/10/2024    MG 2.10 08/10/2024    PHOS 3.1 03/30/2017     LFTs:   Lab Results   Component Value Date    PROT 7.8 02/11/2020    ALBUMIN 3.5 08/10/2024    BILITOT 0.2 08/10/2024    AST 20 08/10/2024    ALKPHOS 117 08/10/2024    ALT 17 08/10/2024     Coags:   Lab Results   Component Value Date    INR 1.1 07/13/2022     FLP: No results found for: "CHOL", "HDL", "LDLCALC", "TRIG", "CHOLHDL"  DM:   Lab Results   Component Value Date    HGBA1C 5.2 02/11/2020    HGBA1C 4.6 03/14/2016    CREATININE 0.81 08/10/2024 " "    Thyroid:   Lab Results   Component Value Date    TSH 0.826 02/11/2020    I5FPQHW 94 03/03/2016    Q1XXMME 7.8 03/03/2016      Anemia:   Lab Results   Component Value Date    IRON 11 (L) 10/05/2018    TIBC 422 10/05/2018    FERRITIN 11 (L) 10/05/2018       Lab Results   Component Value Date    IPJSJQUD52 770 10/05/2018       Lab Results   Component Value Date    FOLATE 2.6 (L) 10/05/2018        Cardiac: No results found for: "TROPONINI", "CKTOTAL", "CKMB", "BNP"  Urinalysis:   Lab Results   Component Value Date    LABURIN ESCHERICHIA COLI  >100,000 cfu/ml   (A) 02/11/2020    COLORU Colorless (A) 08/10/2024    SPECGRAV 1.025 02/11/2020    NITRITE Negative 08/10/2024    KETONESU Negative 02/11/2020    UROBILINOGEN Normal 08/10/2024    WBCUA None Seen 08/10/2024       Trended Lab Data:  Recent Labs   Lab 08/10/24  0001   WBC 5.96   HGB 10.1*   HCT 31.7*   *   MCV 78.5*   RDW 21.5*      K 2.7*   *   CO2 21*   BUN 4.8*   CREATININE 0.81   ALBUMIN 3.5   BILITOT 0.2   AST 20   ALKPHOS 117   ALT 17       Assessment and Plan     Hypokalemia   - K 2.7 on admission   - Kcl 20 mEq IV and 40 mEq PO given   - Repeat CMP in AM    Anion gap metabolic acidosis   - Bicarb 21, anion gap 13   - likely due to elevated EtOH   - Monitor with repeat CMP in AM    Lower extremity weakness, abnormal gait  Recent near falls  Hx of leukodystrophy   - Most recently followed with neurologist, Dr. Du, in [place]. Last appt in 2020. Looking to establish care here at [facility]   - Responded well to IV steroids in the past. Per chart review, was given solumedrol 1g IV   - Deferring initiation of steroids to primary team   - HIV, hepatitis panel, syphilis Ab ordered   - Referral to [facility] neurology outpatient upon discharge    Elevated ethanol level  Frequent alcohol use   - EtOH 270 on admission   - Denies recent alcohol use, reports last drank 2 days ago   - Banana bag given in ED   - CIWA protocol, PRN lorazepam   - Folate, " vitamin D, thiamine, TSH, vitamin B12 ordered    Microcytic anemia   - Hgb 10.1, Hct 31.7, MCV 78.5   - Iron, TIBC, ferritin ordered   - Repeat CBC in AM    Hx of anxiety and depression   - Continue home Paxil 20 mg daily          CODE STATUS: Full  Access: peripheral IV  Antibiotics: N/A  Diet: Adult regular  DVT Prophylaxis: Lovenox  GI Prophylaxis: N/A  Fluids: NS with vitamin K, thiamine, folate @ 200 mL/hr      Disposition: Admitted to observation for treatment of electrolyte derangements.       Samira Wood MD  [facility]

## 2024-08-10 NOTE — PT/OT/SLP EVAL
Physical Therapy Evaluation    Patient Name:  Richard Perales   MRN:  [id number]    Recommendations:     Therapy Intensity Recommendations at Discharge: No Therapy Indicated  Discharge Equipment Recommendations: walker, rolling   Equipment to be obtained for discharge:  TBD .  Barriers to discharge: decreased endurance    Assessment:     Richard Perales is a 38 y.o. female admitted with a medical diagnosis of  Hypokalemia.  She presents with the following impairments/functional limitations:  impaired endurance, impaired self care skills, gait instability, impaired functional mobility, decreased lower extremity function.    Rehab Prognosis: Good.    Patient would benefit from continued skilled acute PT services to: address above listed impairments/functional limitations; receive patient/caregiver education; reduce fall risk; and maximize independency/safety with functional mobility.    Recent Surgery: * No surgery found *      Plan:     During this hospitalization, patient to be seen 3 x/week to address the identified impairments/functional limitations via gait training, therapeutic activities, therapeutic exercises and progress toward the established goals.      Goals     Multidisciplinary Problems       Physical Therapy Goals          Problem: Physical Therapy    Goal Priority Disciplines Outcome Goal Variances Interventions   Physical Therapy Goal     PT, PT/OT      Description: Goals to be met by: discharge     Patient will increase functional independence with mobility by performin. Supine <=> sit at Dino.  2. Sit <=> stand with RW at Dino.  3. Ambulate 350' with RW at supervision.  4. Ascend/descend 5 steps with handrail at supervision.                    Plan of Care Expires:  24    Subjective     Communicated with patient's nurse Tete prior to session.    Patient agreeable to participate in evaluation.     Chief Complaint: Bilateral LE numbness but not in the feet.  Patient/Family  Comments/goals: Patient wants to return to prior level of function.    Pain/Comfort:  Pain Rating 1: 0/10  Pain Rating Post-Intervention 1: 0/10    Patients cultural, spiritual, Hinduism conflicts given the current situation: no    Social History  Living Environment: Patient lives with their son and mother in a single level home, with 5 steps steps outside, with bilat handrails, with tub-shower combo.  Functional Level: Prior to admission patient ambulated without assistive device, was independent in ADL's, and was independent in IADL's.  Equipment Used at Home: none  Equipment owned (not currently used): none.  Assistance Upon Discharge: family.    Objective:     Patient found with HOB elevated with peripheral IV  upon PT entry to room.    General Precautions: Standard,     Orthopedic Precautions:N/A   Braces: N/A  Respiratory Status: room air    Vitals   At Rest (pre-session)  BP  134/87   HR  82   O2 Sat %  98      With Activity (post-session)  BP  142/88   HR  93   O2 Sat %  99     Exams:  Orientation: Patient is oriented to person, place, time, situation  Commands: Patient follows commands consistently  Sensation:    -     Intact: light/touch bilat lower extremity  RLE ROM: WFL  RLE Strength:  4-/5  LLE ROM: WFL  LLE Strength:  4-/5    Functional Mobility:    Bed Mobility:  Rolling Left: supervision  Rolling Right: supervision  Supine to Sit: stand by assistance  Sit to Supine: stand by assistance    Transfers:  Sit to Stand: contact guard assistance with no assistive device  Toilet Transfer: contact guard assistance with no assistive device using Step Transfer    Gait:  Patient ambulated 130ft with rolling walker and contact guard assistance.  Patient demonstrates :       decreased sangeeta       decreased step length.    Other Mobility:  not assessed    Balance:  Sit  Static: GOOD+: Takes MAXIMAL challenges from all directions.    Dynamic: GOOD-: Incosistently Maintains balance through MODERATE excursions of  active trunk movement,     Stand  Static: FAIR+: Takes MINIMAL challenges from all directions  Dynamic: FAIR: Needs CONTACT GUARD during gait    Additional Treatment Session  n/a    Patient left supine in bed and with HOB elevated with all lines intact and call button in reach.    Education     Patient educated on the importance of early mobility to prevent functional decline during hospital stay.  Patient educated on and assisted with functional mobility as noted above.  Patient educated on PT Plan of Care and role of PT in acute care.  Patient was instructed to utilize staff assistance for mobility/transfers.  White board updated regarding patient's safest level of mobility with staff assistance           History:     Past Medical History:   Diagnosis Date    Depression     Leukodystrophy     Neurologic disorder      Past Surgical History:   Procedure Laterality Date    MYOMECTOMY N/A 5/9/2019    Procedure: MYOMECTOMY OPEN;  Surgeon: Jennyfer Ibarra MD;  Location: [facility];  Service: OB/GYN;  Laterality: N/A;  EXPAREL     Time Tracking:     PT Received On: 08/10/24  PT Start Time: 1155     PT Stop Time: 1235  PT Total Time (min): 40 min     Billable Minutes: Evaluation 40    08/10/2024

## 2024-08-10 NOTE — DISCHARGE SUMMARY
[facility] Medicine Discharge Summary    Admitting Physician: Tato Clemons MD  Attending Physician: Tato Clemons MD  Date of Admit: 8/9/2024  Date of Discharge: 8/10/2024    Condition: Stable  Outcome: Condition has improved and patient is now ready for discharge.  DISPOSITION: Home or Self Care    Discharge Diagnoses     Patient Active Problem List   Diagnosis    White matter abnormality on MRI of brain    RTA (renal tubular acidosis)    Leukodystrophy    Pain in both knees    Family history of colon cancer    Microcytic anemia    Left leg swelling    Iron deficiency anemia    Vitamin D deficiency    Uterine leiomyoma    S/P myomectomy    Morbid obesity    Acute posthemorrhagic anemia   \  Principal Problem:  Hypokalemia    Consultants and Procedures     Consultants:  IP CONSULT TO INTERNAL MEDICINE    Procedures:   * No surgery found *     Brief Admission History      Richard Perales is a 38 y.o. female, with PMH significant for leukodystrophy, anxiety, and depression, who presented to [facility] ED on 8/9/2024  with a primary complaint of lower extremity numbness. She has a history of lower extremity numbness and weakness due to her leukodystrophy but it has worsened over the last two weeks. She states that she has intact sensation to touch but cannot feel her legs when walking. She has had two recent episodes of her legs giving out while walking, causing her to almost fall but has had something nearby to prevent her from falling. She is able to walk unassisted at home but endorses concern over not being able to carry her two year old toddler up the stairs because of her lower extremity weakness. She has had episodes like this in the past and responded well to IV steroids. She has not been on steroids in about 2 years. She reports feeling like she is intoxicated but states that the last time she drank alcohol was 2 days ago. She currently lives in [place] and drove herself to the ED tonight. She  "has been followed by various neurologists, most recently Dr. Du in [place]. She is looking to establishing care with a neurologist here at [facility].       In the ED, vitals upon arrival were T 98.1 F, /91, , RR 18, SpO2 100% on RA. Labs significant for Hgb 10.1, Hct 31.7, MCV 78.5, RDW 21.5, K 2.7, bicarb 21, glucose 125. Ethanol level 270. UDS negative. UA unremarkable. She was started on 0.9% NaCl with vitamin K, thiamine, and folic acid. Potassium chloride 40 mEq PO and 20 mEq IV given. Medicine was consulted for further management.     Hospital Course with Pertinent Findings     Patient admitted overnight due to asymptomatic critical hypokalemia which was corrected since that time.  Apparently she drove herself from [place] to this facility and had an alcohol level of 270 on initial labs.  This morning she had 1 episode of emesis with continued nausea and headaches likely due to veisalgia.  She was able to tolerate p.o. intake prior to discharge.  She has a history significant for leukodystrophy last seen by Neurology approximately 2-3 years ago in [place] in his requested a referral to Neurology closer to home.  Referral sent to [facility].  Counseled on cessation of alcohol consumption with emphasis on refrain from drinking and driving.    Discharge physical exam:  Vitals  BP: (!) 148/90  Temp: 98.6 °F (37 °C)  Temp Source: Oral  Pulse: 82  Resp: 16  SpO2: 98 %  Height: 5' 4" (162.6 cm)  Weight: 100.3 kg (221 lb 1.9 oz)    Physical Exam  Vitals reviewed.   Constitutional:       Appearance: Normal appearance.   HENT:      Head: Normocephalic and atraumatic.      Mouth/Throat:      Mouth: Mucous membranes are moist.      Pharynx: Oropharynx is clear.   Eyes:      Pupils: Pupils are equal, round, and reactive to light.   Cardiovascular:      Rate and Rhythm: Normal rate and regular rhythm.      Pulses: Normal pulses.      Heart sounds: No murmur heard.     No friction rub. No gallop. "   Pulmonary:      Effort: Pulmonary effort is normal.      Breath sounds: No wheezing, rhonchi or rales.   Abdominal:      General: Bowel sounds are normal. There is no distension.      Palpations: Abdomen is soft.      Tenderness: There is no abdominal tenderness.   Musculoskeletal:      Cervical back: Neck supple.      Right lower leg: No edema.      Left lower leg: No edema.   Skin:     General: Skin is warm and dry.      Capillary Refill: Capillary refill takes less than 2 seconds.   Neurological:      General: No focal deficit present.      Mental Status: She is alert and oriented to person, place, and time. Mental status is at baseline.      Sensory: No sensory deficit.      Motor: No weakness.           TIME SPENT ON DISCHARGE: 60 minutes    Discharge Medications        Medication List        CONTINUE taking these medications      acetaminophen 325 MG tablet  Commonly known as: TYLENOL  Take 2 tablets (650 mg total) by mouth every 6 (six) hours.     baclofen 20 MG tablet  Commonly known as: LIORESAL  Take 1 tablet (20 mg total) by mouth 3 (three) times daily.     LISET 0.35 mg tablet  Generic drug: norethindrone     fluconazole 150 MG Tab  Commonly known as: DIFLUCAN     gabapentin 300 MG capsule  Commonly known as: NEURONTIN  Take 1 capsule (300 mg total) by mouth 2 (two) times daily.     ibuprofen 600 MG tablet  Commonly known as: ADVIL,MOTRIN  Take 1 tablet (600 mg total) by mouth every 6 (six) hours as needed.     multivitamin tablet  Commonly known as: THERAGRAN  Take 1 tablet by mouth once daily.     oxyCODONE-acetaminophen 5-325 mg per tablet  Commonly known as: PERCOCET  Take 1 tablet by mouth every 4 (four) hours as needed for Pain.     paroxetine 20 MG tablet  Commonly known as: PAXIL  Take 1 tablet (20 mg total) by mouth every morning.     sulfamethoxazole-trimethoprim 800-160mg 800-160 mg Tab  Commonly known as: BACTRIM DS  Take 1 tablet by mouth 2 (two) times daily.     traZODone 50 MG  tablet  Commonly known as: DESYREL  Take 1 tablet (50 mg total) by mouth every evening.     UNABLE TO FIND            Discharge Information:     - Referral sent to Neurology per patient request for leukodystrophy  - advised patient to refrain from alcohol use with emphasis on driving while intoxicated    Aidan Butterfield MD  [facility] PGY-III

## 2024-08-11 NOTE — PT/OT/SLP DISCHARGE
Physical Therapy Discharge Summary    Name: Richard Perales  MRN: [id number]   Principal Problem: Hypokalemia   1. Leukodystrophy    2. Paresthesias    3. Edema, unspecified type    4. Alcohol abuse with intoxication    5. Hypokalemia    6. Anemia, unspecified type       Patient Active Problem List   Diagnosis    White matter abnormality on MRI of brain    RTA (renal tubular acidosis)    Leukodystrophy    Pain in both knees    Family history of colon cancer    Microcytic anemia    Left leg swelling    Iron deficiency anemia    Vitamin D deficiency    Uterine leiomyoma    S/P myomectomy    Morbid obesity    Acute posthemorrhagic anemia      Recommendations - per last treatment session     Therapy Intensity Recommendations at Discharge: No Therapy Indicated  Discharge Equipment Recommendations: walker, rolling     Assessment:     Refer to prior Physical Therapy note dated 8/10/24 for patient's most recent functional status, goal achievement and therapists' recommendations.    Hospital Discharge.    Objective     GOALS:  Multidisciplinary Problems       Physical Therapy Goals          Problem: Physical Therapy    Goal Priority Disciplines Outcome Goal Variances Interventions   Physical Therapy Goal     PT, PT/OT Unable to Meet     Description: Goals to be met by: discharge     Patient will increase functional independence with mobility by performin. Supine <=> sit at [place]; Not Met  2. Sit <=> stand with RW at [place]; Not Met  3. Ambulate 350' with RW at supervision; Not Met  4. Ascend/descend 5 steps with handrail at supervision; Not Met                       Plan     Patient discharged from acute PT Services on 2024.    Patient Discharged to: home or self care per chart.    2024

## 2024-08-12 DIAGNOSIS — G93.49 LEUKOENCEPHALOPATHY: ICD-10-CM

## 2024-08-12 NOTE — PROGRESS NOTES
08/12/24 0875   Missed Time Reason   Missed Treatment Reason Patient discharged prior to initiation of evaluation

## 2024-08-15 RX ORDER — BACLOFEN 20 MG/1
20 TABLET ORAL 3 TIMES DAILY
Qty: 90 TABLET | Refills: 5 | Status: SHIPPED | OUTPATIENT
Start: 2024-08-15 | End: 2025-08-15

## 2024-08-15 RX ORDER — TRAZODONE HYDROCHLORIDE 50 MG/1
50 TABLET ORAL NIGHTLY
Qty: 30 TABLET | Refills: 3 | Status: SHIPPED | OUTPATIENT
Start: 2024-08-15

## 2024-08-15 RX ORDER — GABAPENTIN 300 MG/1
300 CAPSULE ORAL 2 TIMES DAILY
Qty: 60 CAPSULE | Refills: 3 | Status: SHIPPED | OUTPATIENT
Start: 2024-08-15 | End: 2025-08-15

## 2024-08-15 RX ORDER — PAROXETINE HYDROCHLORIDE 20 MG/1
20 TABLET, FILM COATED ORAL EVERY MORNING
Qty: 30 TABLET | Refills: 3 | Status: SHIPPED | OUTPATIENT
Start: 2024-08-15

## (undated) DEVICE — SYR 10CC LUER LOCK

## (undated) DEVICE — SOL PVP-I SCRUB 7.5% 4OZ

## (undated) DEVICE — SEE MEDLINE ITEM 152622

## (undated) DEVICE — SPONGE LAP 18X18 PREWASHED

## (undated) DEVICE — SUT MONOCRYL 2-0 VIOL 27IN

## (undated) DEVICE — SUT 0 60IN PDS II VIO MONO

## (undated) DEVICE — SEE MEDLINE ITEM 153151

## (undated) DEVICE — SCRUB 10% POVIDONE IODINE 4OZ

## (undated) DEVICE — SEE MEDLINE ITEM 157110

## (undated) DEVICE — APPLICATOR CHLORAPREP ORN 26ML

## (undated) DEVICE — WARMER DRAPE STERILE LF

## (undated) DEVICE — SET DECANTER MEDICHOICE

## (undated) DEVICE — TAPE ADH MEDIPORE 4 X 10YDS

## (undated) DEVICE — SOL STRL WATER INJ 1000ML BG

## (undated) DEVICE — SUT 0 8-27IN VICRYL PL CT-1

## (undated) DEVICE — NDL SPINAL 20GX3.5 HUB

## (undated) DEVICE — Device

## (undated) DEVICE — SOL 9P NACL IRR PIC IL

## (undated) DEVICE — ADHESIVE MASTISOL VIAL 48/BX

## (undated) DEVICE — KIT URINE METER 350ML STAT LOC

## (undated) DEVICE — ELECTRODE REM PLYHSV RETURN 9

## (undated) DEVICE — SOL LR INJ 1000ML BG

## (undated) DEVICE — SUT CTD VICRYL 0 UND BR SUT

## (undated) DEVICE — JELLY SURGILUBE 5GR

## (undated) DEVICE — SUT VICRYL 2-0 36 CT-1

## (undated) DEVICE — DRESSING ABSRBNT ISLAND 3.6X8

## (undated) DEVICE — CLOSURE SKIN STERI STRIP 1/2X4

## (undated) DEVICE — SUT 0 VICRYL PLUS CT-1 27IN

## (undated) DEVICE — ELECTRODE NEEDLE 1IN

## (undated) DEVICE — GLOVE BIOGEL SKINSENSE PI 6.0

## (undated) DEVICE — GAUZE SPONGE 4X4 12PLY

## (undated) DEVICE — SUT MCRYL PLUS 4-0 PS2 27IN

## (undated) DEVICE — SOL WATER STRL IRR 1000ML

## (undated) DEVICE — DRESSING TELFA N ADH 3X8

## (undated) DEVICE — PAD ABD 8X10 STERILE

## (undated) DEVICE — DRAPE LAP TIBURON 77X122IN

## (undated) DEVICE — TRAY DRY SKIN SCRUB PREP